# Patient Record
Sex: FEMALE | Race: BLACK OR AFRICAN AMERICAN | Employment: OTHER | URBAN - METROPOLITAN AREA
[De-identification: names, ages, dates, MRNs, and addresses within clinical notes are randomized per-mention and may not be internally consistent; named-entity substitution may affect disease eponyms.]

---

## 2018-10-19 RX ORDER — METOPROLOL SUCCINATE 50 MG/1
50 TABLET, EXTENDED RELEASE ORAL DAILY
Qty: 180 TAB | Refills: 1 | Status: SHIPPED | OUTPATIENT
Start: 2018-10-19 | End: 2019-02-20 | Stop reason: SDUPTHER

## 2018-11-23 RX ORDER — PANTOPRAZOLE SODIUM 40 MG/1
40 TABLET, DELAYED RELEASE ORAL DAILY
Qty: 180 TAB | Refills: 0 | Status: SHIPPED | OUTPATIENT
Start: 2018-11-23 | End: 2019-05-17 | Stop reason: SDUPTHER

## 2018-11-23 RX ORDER — PANTOPRAZOLE SODIUM 40 MG/1
40 TABLET, DELAYED RELEASE ORAL DAILY
COMMUNITY
End: 2018-11-23 | Stop reason: SDUPTHER

## 2019-02-14 ENCOUNTER — OFFICE VISIT (OUTPATIENT)
Dept: FAMILY MEDICINE CLINIC | Age: 84
End: 2019-02-14

## 2019-02-14 VITALS
RESPIRATION RATE: 16 BRPM | DIASTOLIC BLOOD PRESSURE: 76 MMHG | TEMPERATURE: 97.9 F | SYSTOLIC BLOOD PRESSURE: 193 MMHG | HEART RATE: 68 BPM | HEIGHT: 55 IN | WEIGHT: 142 LBS | BODY MASS INDEX: 32.86 KG/M2 | OXYGEN SATURATION: 100 %

## 2019-02-14 DIAGNOSIS — I10 ESSENTIAL HYPERTENSION: Primary | ICD-10-CM

## 2019-02-14 RX ORDER — LOSARTAN POTASSIUM 25 MG/1
25 TABLET ORAL DAILY
Refills: 1 | COMMUNITY
Start: 2019-01-08

## 2019-02-14 RX ORDER — AMLODIPINE BESYLATE 10 MG/1
TABLET ORAL DAILY
COMMUNITY
End: 2019-02-14 | Stop reason: SDUPTHER

## 2019-02-14 RX ORDER — MYCOPHENOLATE MOFETIL 250 MG/1
CAPSULE ORAL
Refills: 1 | COMMUNITY
Start: 2019-02-04 | End: 2019-03-28 | Stop reason: ALTCHOICE

## 2019-02-14 RX ORDER — AMLODIPINE BESYLATE 10 MG/1
10 TABLET ORAL DAILY
Qty: 90 TAB | Refills: 1 | Status: SHIPPED | OUTPATIENT
Start: 2019-02-14 | End: 2019-08-06 | Stop reason: SDUPTHER

## 2019-02-14 NOTE — PROGRESS NOTES
Assessment/Plan:  
 
{There are no diagnoses linked to this encounter. (Refresh or delete this SmartLink)} Follow-up Disposition: Not on File Discussed expected course/resolution/complications of diagnosis in detail with patient.   
Medication risks/benefits/costs/interactions/alternatives discussed with patient.   
Pt was given after visit summary which includes diagnoses, current medications & vitals. Pt expressed understanding with the diagnosis and plan Subjective:  
  
Turner Juarez is a 80 y.o. female who presents for had concerns including Hospital Follow Up ([address]   1 WEEK AGO   ELEVATED BP CAUSING H/A). Here today for follow up hospital on 2/8/19 due to headache and BP found to be in 200's / 100's. She had CT and \"other tests and were normal\"  She was told to come back to PCP for medication evaluation. Takes BP at home and states that some days it is high and some days normal.  Today BP is 193/76. She states she has a headache. Denies CP, SOB, palpitations or leg swelling today. She states her Nephrologist put her back on losartan  25mg in Jan this year because her she had protein in her urine. She also states she was taking 10mg of amlodipine and had not taking it since December because she ran out. Current Outpatient Medications Medication Sig Dispense Refill  losartan (COZAAR) 25 mg tablet TK 1 T PO QD  1  
 mycophenolate mofetil (CELLCEPT) 250 mg capsule TAKE 3 CAPSULES BY MOUTH BID  1  
 pantoprazole (PROTONIX) 40 mg tablet Take 1 Tab by mouth daily. 180 Tab 0  
 metoprolol succinate (TOPROL-XL) 50 mg XL tablet Take 1 Tab by mouth daily. 180 Tab 1 No Known Allergies ROS:  
ROS Objective:  
 
Visit Vitals /76 Pulse 68 Temp 97.9 °F (36.6 °C) (Oral) Resp 16 Ht 4' 6\" (1.372 m) Wt 142 lb (64.4 kg) SpO2 100% BMI 34.24 kg/m² Vitals and Nurse Documentation reviewed.   
 
Physical Exam 
 
 No results found for this or any previous visit.

## 2019-02-14 NOTE — PATIENT INSTRUCTIONS
High Blood Pressure: Care Instructions  Overview    It's normal for blood pressure to go up and down throughout the day. But if it stays up, you have high blood pressure. Another name for high blood pressure is hypertension. Despite what a lot of people think, high blood pressure usually doesn't cause headaches or make you feel dizzy or lightheaded. It usually has no symptoms. But it does increase your risk of stroke, heart attack, and other problems. You and your doctor will talk about your risks of these problems based on your blood pressure. Your doctor will give you a goal for your blood pressure. Your goal will be based on your health and your age. Lifestyle changes, such as eating healthy and being active, are always important to help lower blood pressure. You might also take medicine to reach your blood pressure goal.  Follow-up care is a key part of your treatment and safety. Be sure to make and go to all appointments, and call your doctor if you are having problems. It's also a good idea to know your test results and keep a list of the medicines you take. How can you care for yourself at home? Medical treatment  · If you stop taking your medicine, your blood pressure will go back up. You may take one or more types of medicine to lower your blood pressure. Be safe with medicines. Take your medicine exactly as prescribed. Call your doctor if you think you are having a problem with your medicine. · Talk to your doctor before you start taking aspirin every day. Aspirin can help certain people lower their risk of a heart attack or stroke. But taking aspirin isn't right for everyone, because it can cause serious bleeding. · See your doctor regularly. You may need to see the doctor more often at first or until your blood pressure comes down. · If you are taking blood pressure medicine, talk to your doctor before you take decongestants or anti-inflammatory medicine, such as ibuprofen.  Some of these medicines can raise blood pressure. · Learn how to check your blood pressure at home. Lifestyle changes  · Stay at a healthy weight. This is especially important if you put on weight around the waist. Losing even 10 pounds can help you lower your blood pressure. · If your doctor recommends it, get more exercise. Walking is a good choice. Bit by bit, increase the amount you walk every day. Try for at least 30 minutes on most days of the week. You also may want to swim, bike, or do other activities. · Avoid or limit alcohol. Talk to your doctor about whether you can drink any alcohol. · Try to limit how much sodium you eat to less than 2,300 milligrams (mg) a day. Your doctor may ask you to try to eat less than 1,500 mg a day. · Eat plenty of fruits (such as bananas and oranges), vegetables, legumes, whole grains, and low-fat dairy products. · Lower the amount of saturated fat in your diet. Saturated fat is found in animal products such as milk, cheese, and meat. Limiting these foods may help you lose weight and also lower your risk for heart disease. · Do not smoke. Smoking increases your risk for heart attack and stroke. If you need help quitting, talk to your doctor about stop-smoking programs and medicines. These can increase your chances of quitting for good. When should you call for help? Call 911 anytime you think you may need emergency care. This may mean having symptoms that suggest that your blood pressure is causing a serious heart or blood vessel problem. Your blood pressure may be over 180/120.   For example, call 911 if:    · You have symptoms of a heart attack. These may include:  ? Chest pain or pressure, or a strange feeling in the chest.  ? Sweating. ? Shortness of breath. ? Nausea or vomiting. ? Pain, pressure, or a strange feeling in the back, neck, jaw, or upper belly or in one or both shoulders or arms. ? Lightheadedness or sudden weakness.   ? A fast or irregular heartbeat.     · You have symptoms of a stroke. These may include:  ? Sudden numbness, tingling, weakness, or loss of movement in your face, arm, or leg, especially on only one side of your body. ? Sudden vision changes. ? Sudden trouble speaking. ? Sudden confusion or trouble understanding simple statements. ? Sudden problems with walking or balance. ? A sudden, severe headache that is different from past headaches.     · You have severe back or belly pain.    Do not wait until your blood pressure comes down on its own. Get help right away.   Call your doctor now or seek immediate care if:    · Your blood pressure is much higher than normal (such as 180/120 or higher), but you don't have symptoms.     · You think high blood pressure is causing symptoms, such as:  ? Severe headache.  ? Blurry vision.    Watch closely for changes in your health, and be sure to contact your doctor if:    · Your blood pressure measures higher than your doctor recommends at least 2 times. That means the top number is higher or the bottom number is higher, or both.     · You think you may be having side effects from your blood pressure medicine. Where can you learn more? Go to http://jordyn-alec.info/. Enter Y233 in the search box to learn more about \"High Blood Pressure: Care Instructions. \"  Current as of: July 22, 2018  Content Version: 11.9  © 5448-3890 [employer]. Care instructions adapted under license by [employer] (which disclaims liability or warranty for this information). If you have questions about a medical condition or this instruction, always ask your healthcare professional. [place] [address] any warranty or liability for your use of this information.

## 2019-02-14 NOTE — PROGRESS NOTES
Chief Complaint   Patient presents with   [facility] Follow Up     JW H/O   HYPERTENSION   1 WEEK AGO   ELEVATED BP CAUSING H/A        Health Maintenance Due   Topic    DTaP/Tdap/Td series (1 - Tdap)    Shingrix Vaccine Age 50> (1 of 2)    GLAUCOMA SCREENING Q2Y     Bone Densitometry (Dexa) Screening     Pneumococcal 65+ Low/Medium Risk (1 of 2 - PCV13)    Influenza Age 5 to Adult        Wt Readings from Last 3 Encounters:   02/14/19 142 lb (64.4 kg)     Temp Readings from Last 3 Encounters:   02/14/19 97.9 °F (36.6 °C) (Oral)     BP Readings from Last 3 Encounters:   02/14/19 199/79     Pulse Readings from Last 3 Encounters:   02/14/19 68         Learning Assessment:  :     No flowsheet data found. Depression Screening:  :     3 most recent PHQ Screens 2/14/2019   Little interest or pleasure in doing things Not at all   Feeling down, depressed, irritable, or hopeless Not at all   Total Score PHQ 2 0       Fall Risk Assessment:  :     Fall Risk Assessment, last 12 mths 2/14/2019   Able to walk? Yes   Fall in past 12 months? No       Abuse Screening:  :     No flowsheet data found. Coordination of Care Questionnaire:  :     1) Have you been to an emergency room, urgent care clinic since your last visit? NO   Hospitalized since your last visit? NO             2) Have you seen or consulted any other health care providers outside of [address] [place] [address] since your last visit? NO    3) Do you have an Advance Directive on file? NO    Patient is accompanied by self I have received verbal consent from Adelina Snellen to discuss any/all medical information while they are present in the room.

## 2019-02-16 NOTE — PROGRESS NOTES
Assessment/Plan:     Diagnoses and all orders for this visit:    1. Essential hypertension  -     amLODIPine (NORVASC) 10 mg tablet; Take 1 Tab by mouth daily. - Worsening, refill of amlodipine today, monitor BP at home, RTC in 2 wks, reasons to go to ED discussed. Follow-up Disposition:  Return in about 2 weeks (around 2/28/2019). Discussed expected course/resolution/complications of diagnosis in detail with patient.    Medication risks/benefits/costs/interactions/alternatives discussed with patient.    Pt was given after visit summary which includes diagnoses, current medications & vitals. Pt expressed understanding with the diagnosis and plan        Subjective:      Sue Collado is a 80 y.o. female who presents for had concerns including Hospital Follow Up ([address]   1 WEEK AGO   ELEVATED BP CAUSING H/A). Here today for follow up hospital on 2/8/19 due to headache and BP found to be in 200's / 100's. She had CT and \"other tests and were normal\"  She was told to come back to PCP for medication evaluation. Takes BP at home and states that some days it is high and some days normal.  Today BP is 193/76. She states she has a headache. Denies CP, SOB, palpitations or leg swelling today. She states her Nephrologist put her back on losartan  25mg in Jan this year because her she had protein in her urine. She also states she was taking 10mg of amlodipine and had not taking it since December because she ran out. Current Outpatient Medications   Medication Sig Dispense Refill    losartan (COZAAR) 25 mg tablet TK 1 T PO QD  1    mycophenolate mofetil (CELLCEPT) 250 mg capsule TAKE 3 CAPSULES BY MOUTH BID  1    amLODIPine (NORVASC) 10 mg tablet Take 1 Tab by mouth daily. 90 Tab 1    pantoprazole (PROTONIX) 40 mg tablet Take 1 Tab by mouth daily. 180 Tab 0    metoprolol succinate (TOPROL-XL) 50 mg XL tablet Take 1 Tab by mouth daily.  180 Tab 1       No Known Allergies    ROS: Review of Systems   Constitutional: Negative for fever and malaise/fatigue. Respiratory: Negative for cough and shortness of breath. Cardiovascular: Negative for chest pain, palpitations and leg swelling. Neurological: Positive for headaches. Objective:     Visit Vitals  /76   Pulse 68   Temp 97.9 °F (36.6 °C) (Oral)   Resp 16   Ht 4' 6\" (1.372 m)   Wt 142 lb (64.4 kg)   SpO2 100%   BMI 34.24 kg/m²       Vitals and Nurse Documentation reviewed. Physical Exam   Constitutional: She is well-developed, well-nourished, and in no distress. No distress. HENT:   Head: Normocephalic and atraumatic. Cardiovascular: Normal rate, regular rhythm and normal heart sounds. Exam reveals no gallop and no friction rub. No murmur heard. Pulmonary/Chest: Effort normal and breath sounds normal. No respiratory distress. She has no wheezes. She has no rales. Neurological: She is alert. Skin: She is not diaphoretic. Psychiatric: Affect normal.       No results found for this or any previous visit.

## 2019-02-20 RX ORDER — METOPROLOL SUCCINATE 50 MG/1
50 TABLET, EXTENDED RELEASE ORAL 2 TIMES DAILY
Qty: 180 TAB | Refills: 1 | Status: SHIPPED | OUTPATIENT
Start: 2019-02-20 | End: 2019-08-19 | Stop reason: SDUPTHER

## 2019-02-28 ENCOUNTER — OFFICE VISIT (OUTPATIENT)
Dept: FAMILY MEDICINE CLINIC | Age: 84
End: 2019-02-28

## 2019-02-28 VITALS
HEIGHT: 55 IN | SYSTOLIC BLOOD PRESSURE: 128 MMHG | WEIGHT: 143.5 LBS | DIASTOLIC BLOOD PRESSURE: 72 MMHG | TEMPERATURE: 98.2 F | HEART RATE: 74 BPM | RESPIRATION RATE: 16 BRPM | OXYGEN SATURATION: 98 % | BODY MASS INDEX: 33.21 KG/M2

## 2019-02-28 DIAGNOSIS — Z00.00 MEDICARE ANNUAL WELLNESS VISIT, SUBSEQUENT: Primary | ICD-10-CM

## 2019-02-28 DIAGNOSIS — I10 ESSENTIAL HYPERTENSION: ICD-10-CM

## 2019-02-28 DIAGNOSIS — F41.9 ANXIETY: ICD-10-CM

## 2019-02-28 LAB
CREATININE, EXTERNAL: 1.83
MICROALBUMIN UR TEST STR-MCNC: 510.6 MG/DL

## 2019-02-28 RX ORDER — HYDROXYZINE 25 MG/1
12.5 TABLET, FILM COATED ORAL
Qty: 30 TAB | Refills: 1 | Status: SHIPPED | OUTPATIENT
Start: 2019-02-28 | End: 2019-03-10

## 2019-02-28 NOTE — PATIENT INSTRUCTIONS
Medicare Wellness Visit, Female The best way to live healthy is to have a lifestyle where you eat a well-balanced diet, exercise regularly, limit alcohol use, and quit all forms of tobacco/nicotine, if applicable. Regular preventive services are another way to keep healthy. Preventive services (vaccines, screening tests, monitoring & exams) can help personalize your care plan, which helps you manage your own care. Screening tests can find health problems at the earliest stages, when they are easiest to treat. [facility] follows the current, evidence-based guidelines published by the [facility] ([facility]) when recommending preventive services for our patients. Because we follow these guidelines, sometimes recommendations change over time as research supports it. (For example, mammograms used to be recommended annually. Even though Medicare will still pay for an annual mammogram, the newer guidelines recommend a mammogram every two years for women of average risk.) Of course, you and your doctor may decide to screen more often for some diseases, based on your risk and your health status. Preventive services for you include: - Medicare offers their members a free annual wellness visit, which is time for you and your primary care provider to discuss and plan for your preventive service needs. Take advantage of this benefit every year! 
-All adults over the age of 72 should receive the recommended pneumonia vaccines. Current USPSTF guidelines recommend a series of two vaccines for the best pneumonia protection.  
-All adults should have a flu vaccine yearly and a tetanus vaccine every 10 years. All adults age 61 and older should receive a shingles vaccine once in their lifetime.   
-A bone mass density test is recommended when a woman turns 65 to screen for osteoporosis. This test is only recommended one time, as a screening. Some providers will use this same test as a disease monitoring tool if you already have osteoporosis. -All adults age 38-68 who are overweight should have a diabetes screening test once every three years.  
-Other screening tests and preventive services for persons with diabetes include: an eye exam to screen for diabetic retinopathy, a kidney function test, a foot exam, and stricter control over your cholesterol.  
-Cardiovascular screening for adults with routine risk involves an electrocardiogram (ECG) at intervals determined by your doctor.  
-Colorectal cancer screenings should be done for adults age 54-65 with no increased risk factors for colorectal cancer. There are a number of acceptable methods of screening for this type of cancer. Each test has its own benefits and drawbacks. Discuss with your doctor what is most appropriate for you during your annual wellness visit. The different tests include: colonoscopy (considered the best screening method), a fecal occult blood test, a fecal DNA test, and sigmoidoscopy. -Breast cancer screenings are recommended every other year for women of normal risk, age 54-69. 
-Cervical cancer screenings for women over age 72 are only recommended with certain risk factors.  
-All adults born between [facility] should be screened once for Hepatitis C. Here is a list of your current Health Maintenance items (your personalized list of preventive services) with a due date: 
Health Maintenance Due Topic Date Due  
 DTaP/Tdap/Td  (1 - Tdap) 06/16/1954  Shingles Vaccine (1 of 2) 06/16/1983  Glaucoma Screening   06/16/1998  Bone Mineral Density   06/16/1998  Pneumococcal Vaccine (1 of 2 - PCV13) 06/16/1998  Flu Vaccine  08/01/2018 Anxiety Disorder: Care Instructions Your Care Instructions Anxiety is a normal reaction to stress. Difficult situations can cause you to have symptoms such as sweaty palms and a nervous feeling. In an anxiety disorder, the symptoms are far more severe. Constant worry, muscle tension, trouble sleeping, nausea and diarrhea, and other symptoms can make normal daily activities difficult or impossible. These symptoms may occur for no reason, and they can affect your work, school, or social life. Medicines, counseling, and self-care can all help. Follow-up care is a key part of your treatment and safety. Be sure to make and go to all appointments, and call your doctor if you are having problems. It's also a good idea to know your test results and keep a list of the medicines you take. How can you care for yourself at home? · Take medicines exactly as directed. Call your doctor if you think you are having a problem with your medicine. · Go to your counseling sessions and follow-up appointments. · Recognize and accept your anxiety. Then, when you are in a situation that makes you anxious, say to yourself, \"This is not an emergency. I feel uncomfortable, but I am not in danger. I can keep going even if I feel anxious. \" · Be kind to your body: 
? Relieve tension with exercise or a massage. ? Get enough rest. 
? Avoid alcohol, caffeine, nicotine, and illegal drugs. They can increase your anxiety level and cause sleep problems. ? Learn and do relaxation techniques. See below for more about these techniques. · Engage your mind. Get out and do something you enjoy. Go to a funny movie, or take a walk or hike. Plan your day. Having too much or too little to do can make you anxious. · Keep a record of your symptoms. Discuss your fears with a good friend or family member, or join a support group for people with similar problems. Talking to others sometimes relieves stress. · Get involved in social groups, or volunteer to help others. Being alone sometimes makes things seem worse than they are.  
· Get at least 30 minutes of exercise on most days of the week to relieve stress. Walking is a good choice. You also may want to do other activities, such as running, swimming, cycling, or playing tennis or team sports. Relaxation techniques Do relaxation exercises 10 to 20 minutes a day. You can play soothing, relaxing music while you do them, if you wish. · Tell others in your house that you are going to do your relaxation exercises. Ask them not to disturb you. · Find a comfortable place, away from all distractions and noise. · Lie down on your back, or sit with your back straight. · Focus on your breathing. Make it slow and steady. · Breathe in through your nose. Breathe out through either your nose or mouth. · Breathe deeply, filling up the area between your navel and your rib cage. Breathe so that your belly goes up and down. · Do not hold your breath. · Breathe like this for 5 to 10 minutes. Notice the feeling of calmness throughout your whole body. As you continue to breathe slowly and deeply, relax by doing the following for another 5 to 10 minutes: · Tighten and relax each muscle group in your body. You can begin at your toes and work your way up to your head. · Imagine your muscle groups relaxing and becoming heavy. · Empty your mind of all thoughts. · Let yourself relax more and more deeply. · Become aware of the state of calmness that surrounds you. · When your relaxation time is over, you can bring yourself back to alertness by moving your fingers and toes and then your hands and feet and then stretching and moving your entire body. Sometimes people fall asleep during relaxation, but they usually wake up shortly afterward. · Always give yourself time to return to full alertness before you drive a car or do anything that might cause an accident if you are not fully alert. Never play a relaxation tape while you drive a car. When should you call for help? Call 911 anytime you think you may need emergency care. For example, call if:   · You feel you cannot stop from hurting yourself or someone else.  
Moe Vazquez the numbers for these national suicide hotlines: 0-331-685-TALK (4-801.471.2190) and 5-729-STBBSIR (8-993.343.3935). If you or someone you know talks about suicide or feeling hopeless, get help right away. 
 Watch closely for changes in your health, and be sure to contact your doctor if: 
  · You have anxiety or fear that affects your life.  
  · You have symptoms of anxiety that are new or different from those you had before. Where can you learn more? Go to http://jordyn-alec.info/. Enter P754 in the search box to learn more about \"Anxiety Disorder: Care Instructions. \" Current as of: September 11, 2018 Content Version: 11.9 © 8833-6907 [employer]. Care instructions adapted under license by [employer] (which disclaims liability or warranty for this information). If you have questions about a medical condition or this instruction, always ask your healthcare professional. [address] any warranty or liability for your use of this information.

## 2019-02-28 NOTE — PROGRESS NOTES
This is the Subsequent Medicare Annual Wellness Exam, performed 12 months or more after the Initial AWV or the last Subsequent AWV I have reviewed the patient's medical history in detail and updated the computerized patient record. History Here today for annual medicare wellness exam and follow up on BP. Was here 2 weeks ago as follow up from hospital for hypertension issues. At last visit we found that she was no longer taking her amlodipine. We restarted amlodipine. She checks BP at home and states gets normal readings up until this past Monday, about 4 days ago. About 3 weeks ago when she was in the hospital and they did MRI of brain, Echo and other tests and were all found to be normal.  She did not bring her medication today. Son is here in the room. Anxiety Son states she has anxiety and she worries over things and that is when her blood pressure goes up. Ms Raquel Hay states she does worry about little things and she can feel when her BP is going up. Past Medical History:  
Diagnosis Date  Diverticulitis  Hypertension History reviewed. No pertinent surgical history. Current Outpatient Medications Medication Sig Dispense Refill  hydrOXYzine HCl (ATARAX) 25 mg tablet Take 0.5 Tabs by mouth two (2) times daily as needed for Anxiety for up to 10 days. 30 Tab 1  
 metoprolol succinate (TOPROL-XL) 50 mg XL tablet Take 1 Tab by mouth two (2) times a day. 180 Tab 1  
 losartan (COZAAR) 25 mg tablet TK 1 T PO QD  1  
 mycophenolate mofetil (CELLCEPT) 250 mg capsule TAKE 3 CAPSULES BY MOUTH BID  1  
 amLODIPine (NORVASC) 10 mg tablet Take 1 Tab by mouth daily. 90 Tab 1  
 pantoprazole (PROTONIX) 40 mg tablet Take 1 Tab by mouth daily. 180 Tab 0 No Known Allergies History reviewed. No pertinent family history. Social History Tobacco Use  Smoking status: Never Smoker  Smokeless tobacco: Never Used Substance Use Topics  Alcohol use:  No  
 Frequency: Never There is no problem list on file for this patient. Review of Systems Constitutional: Negative for fever and malaise/fatigue. Respiratory: Negative for cough and shortness of breath. Cardiovascular: Negative for chest pain, palpitations and leg swelling. Neurological: Negative for dizziness and headaches. Psychiatric/Behavioral: Negative for depression. The patient is nervous/anxious. Depression Risk Factor Screening:  
 
3 most recent PHQ Screens 2/14/2019 Little interest or pleasure in doing things Not at all Feeling down, depressed, irritable, or hopeless Not at all Total Score PHQ 2 0 Alcohol Risk Factor Screening: You do not drink alcohol or very rarely. Functional Ability and Level of Safety:  
Hearing Loss Hearing is good. Activities of Daily Living The home contains: handrails and grab bars Patient does total self care Fall Risk Fall Risk Assessment, last 12 mths 2/14/2019 Able to walk? Yes Fall in past 12 months? No  
 
 
Abuse Screen Patient is not abused Cognitive Screening Evaluation of Cognitive Function: 
Has your family/caregiver stated any concerns about your memory: no 
Normal 
 
Physical Exam  
Constitutional: She is well-developed, well-nourished, and in no distress. No distress. HENT:  
Head: Normocephalic and atraumatic. Cardiovascular: Normal rate, regular rhythm and normal heart sounds. Exam reveals no gallop and no friction rub. No murmur heard. Pulmonary/Chest: Effort normal and breath sounds normal. No respiratory distress. She has no wheezes. She has no rales. Neurological: She is alert. Skin: She is not diaphoretic. Psychiatric: Affect normal.  
 
 
Patient Care Team  
Patient Care Team: Mayito Church MD as PCP - [facility]) Assessment/Plan Education and counseling provided: 
Are appropriate based on today's review and evaluation Diagnoses and all orders for this visit: 
 
1. Medicare annual wellness visit, subsequent 2. Anxiety 
-     hydrOXYzine HCl (ATARAX) 25 mg tablet; Take 0.5 Tabs by mouth two (2) times daily as needed for Anxiety for up to 10 days. - Worsening, start hydroxyzine today as directed. RTC in 1 month for follow up 3. Essential hypertension 
 -Stable today at clinic. Take medications as prescribed. Bring medications to next visit. No labs today as recent labs at hospital were all normal.  Reasons to go to ED discussed. Health Maintenance Due Topic Date Due  
 DTaP/Tdap/Td series (1 - Tdap) 06/16/1954  Shingrix Vaccine Age 50> (1 of 2) 06/16/1983  GLAUCOMA SCREENING Q2Y  06/16/1998  Bone Densitometry (Dexa) Screening  06/16/1998  Pneumococcal 65+ Low/Medium Risk (1 of 2 - PCV13) 06/16/1998  Influenza Age 5 to Adult  08/01/2018

## 2019-02-28 NOTE — PROGRESS NOTES
Chery Castro is a 80 y.o. female Chief Complaint Patient presents with  Follow-up  
  blood pressure 1. Have you been to the ER, urgent care clinic since your last visit? Hospitalized since your last visit? Yes BP (  )  2/25/19  
 
 
2. Have you seen or consulted any other health care providers outside of the [address] [place] [address] since your last visit? Include any pap smears or colon screening.   No

## 2019-03-13 PROBLEM — N18.32 CHRONIC KIDNEY DISEASE (CKD) STAGE G3B/A3, MODERATELY DECREASED GLOMERULAR FILTRATION RATE (GFR) BETWEEN 30-44 ML/MIN/1.73 SQUARE METER AND ALBUMINURIA CREATININE RATIO GREATER THAN 300 MG/G (HCC): Status: ACTIVE | Noted: 2019-03-13

## 2019-03-28 ENCOUNTER — OFFICE VISIT (OUTPATIENT)
Dept: FAMILY MEDICINE CLINIC | Age: 84
End: 2019-03-28

## 2019-03-28 VITALS
RESPIRATION RATE: 16 BRPM | HEART RATE: 68 BPM | WEIGHT: 143 LBS | DIASTOLIC BLOOD PRESSURE: 77 MMHG | TEMPERATURE: 98.4 F | BODY MASS INDEX: 33.09 KG/M2 | OXYGEN SATURATION: 100 % | SYSTOLIC BLOOD PRESSURE: 146 MMHG | HEIGHT: 55 IN

## 2019-03-28 DIAGNOSIS — I10 ESSENTIAL HYPERTENSION: Primary | ICD-10-CM

## 2019-03-28 DIAGNOSIS — F41.9 ANXIETY: ICD-10-CM

## 2019-03-28 RX ORDER — LOSARTAN POTASSIUM 25 MG/1
TABLET ORAL
COMMUNITY
Start: 2019-01-08 | End: 2019-03-28 | Stop reason: SDUPTHER

## 2019-03-28 RX ORDER — AMLODIPINE BESYLATE 10 MG/1
TABLET ORAL
COMMUNITY
Start: 2019-02-14 | End: 2019-03-28 | Stop reason: SDUPTHER

## 2019-03-28 RX ORDER — PANTOPRAZOLE SODIUM 40 MG/1
TABLET, DELAYED RELEASE ORAL
COMMUNITY
Start: 2019-02-19 | End: 2019-03-28 | Stop reason: ALTCHOICE

## 2019-03-28 RX ORDER — METOPROLOL SUCCINATE 50 MG/1
TABLET, EXTENDED RELEASE ORAL
COMMUNITY
Start: 2019-02-20 | End: 2019-03-28 | Stop reason: SDUPTHER

## 2019-03-28 RX ORDER — DICLOFENAC SODIUM 10 MG/G
GEL TOPICAL
COMMUNITY
Start: 2019-03-07 | End: 2022-01-05

## 2019-03-28 RX ORDER — HYDROXYZINE 25 MG/1
TABLET, FILM COATED ORAL
COMMUNITY
End: 2019-07-23 | Stop reason: SDUPTHER

## 2019-03-28 NOTE — PROGRESS NOTES
Bard Marie is a 80 y.o. female      Chief Complaint   Patient presents with    Follow-up     Blood Pressure          1. Have you been to the ER, urgent care clinic since your last visit? Hospitalized since your last visit?   no      2. Have you seen or consulted any other health care providers outside of the [address] [place] [address] since your last visit? Include any pap smears or colon screening.   no

## 2019-03-28 NOTE — PROGRESS NOTES
Assessment/Plan:     Diagnoses and all orders for this visit:    1. Essential hypertension   -improved, continue current therapy, monitor BP at home, RTC in 6 months for follow up. Reasons to go to ED discussed    2. Anxiety   -  Improved, continue current therapy. Follow up as needed            Discussed expected course/resolution/complications of diagnosis in detail with patient.    Medication risks/benefits/costs/interactions/alternatives discussed with patient.    Pt was given after visit summary which includes diagnoses, current medications & vitals. Pt expressed understanding with the diagnosis and plan        Subjective:      Ronaldo Pickens is a 80 y.o. female who presents for had concerns including Follow-up (Blood Pressure ). Here today for BP follow up. BP at goal today. 146/77. Taking medications as prescribed. She checks BP at home and states getting readings around 140/70. No reported side effects. She states she is \"worrying\" less and that the hydroxizine has really helped. She only takes it at night when she needs it. Denies CP, SOB, palpitations    Current Outpatient Medications   Medication Sig Dispense Refill    diclofenac (VOLTAREN) 1 % gel Apply two grams by topical route three times daily to the affected area (s).  hydrOXYzine HCl (ATARAX) 25 mg tablet Take  by mouth three (3) times daily as needed for Itching.  metoprolol succinate (TOPROL-XL) 50 mg XL tablet Take 1 Tab by mouth two (2) times a day. 180 Tab 1    losartan (COZAAR) 25 mg tablet TK 1 T PO QD  1    amLODIPine (NORVASC) 10 mg tablet Take 1 Tab by mouth daily. 90 Tab 1    pantoprazole (PROTONIX) 40 mg tablet Take 1 Tab by mouth daily. 180 Tab 0       Allergies   Allergen Reactions    Penicillins Swelling    Unable To Obtain Swelling       ROS:   Review of Systems   Constitutional: Negative for fever and malaise/fatigue. Respiratory: Negative for cough and shortness of breath.     Cardiovascular: Negative for chest pain, palpitations and leg swelling. Neurological: Negative for dizziness and headaches. Psychiatric/Behavioral: Negative for depression. The patient is not nervous/anxious. Objective:     Visit Vitals  /77 (BP 1 Location: Right arm, BP Patient Position: Sitting)   Pulse 68   Temp 98.4 °F (36.9 °C) (Oral)   Resp 16   Ht 4' 6\" (1.372 m)   Wt 143 lb (64.9 kg)   SpO2 100%   BMI 34.48 kg/m²       Vitals and Nurse Documentation reviewed. Physical Exam   Constitutional: She is well-developed, well-nourished, and in no distress. No distress. HENT:   Head: Normocephalic and atraumatic. Cardiovascular: Normal rate, regular rhythm and normal heart sounds. Exam reveals no gallop and no friction rub. No murmur heard. Pulmonary/Chest: Effort normal and breath sounds normal. No respiratory distress. She has no wheezes. She has no rales. Neurological: She is alert. Skin: She is not diaphoretic. Psychiatric: Affect normal.       No results found for this or any previous visit.

## 2019-05-17 LAB
CREATININE, EXTERNAL: 1.72
MICROALBUMIN UR TEST STR-MCNC: 795.7 MG/DL

## 2019-07-12 RX ORDER — HYDROXYZINE 25 MG/1
TABLET, FILM COATED ORAL
Qty: 60 TAB | Refills: 1 | OUTPATIENT
Start: 2019-07-12

## 2019-07-23 ENCOUNTER — OFFICE VISIT (OUTPATIENT)
Dept: FAMILY MEDICINE CLINIC | Age: 84
End: 2019-07-23

## 2019-07-23 VITALS
BODY MASS INDEX: 33.09 KG/M2 | HEART RATE: 65 BPM | SYSTOLIC BLOOD PRESSURE: 134 MMHG | OXYGEN SATURATION: 98 % | TEMPERATURE: 98.1 F | RESPIRATION RATE: 16 BRPM | HEIGHT: 55 IN | DIASTOLIC BLOOD PRESSURE: 74 MMHG | WEIGHT: 143 LBS

## 2019-07-23 DIAGNOSIS — F41.9 ANXIETY: Primary | ICD-10-CM

## 2019-07-23 RX ORDER — HYDROXYZINE 25 MG/1
12.5 TABLET, FILM COATED ORAL 2 TIMES DAILY
Qty: 30 TAB | Refills: 1 | Status: SHIPPED | OUTPATIENT
Start: 2019-07-23 | End: 2019-10-22 | Stop reason: SDUPTHER

## 2019-07-23 NOTE — PROGRESS NOTES
Assessment/Plan:     Diagnoses and all orders for this visit:    1. Anxiety  -     hydrOXYzine HCl (ATARAX) 25 mg tablet; Take 0.5 Tabs by mouth two (2) times a day. - Stable, refill today, educated on side effects. RTC as needed for anxiety. Discussed expected course/resolution/complications of diagnosis in detail with patient.    Medication risks/benefits/costs/interactions/alternatives discussed with patient.    Pt was given after visit summary which includes diagnoses, current medications & vitals. Pt expressed understanding with the diagnosis and plan        Subjective:      Chetan Finney is a 80 y.o. female who presents for had concerns including Medication Refill (HYDROXYZINE, PANTOPRAZOL). Here today for follow up on anxiety. Takes 1/2 pill of hydroxyzine sometimes at night when she is anxious and unable to sleep. Would like a refill today. States going through stressful event right now as 39year old grandson is in hospice due to heart problems. Needs refill on pantoprazole for acid reflux. States takes 1 tab every day, 40mg. Doing well. BP at goal today. Current Outpatient Medications   Medication Sig Dispense Refill    hydrOXYzine HCl (ATARAX) 25 mg tablet Take 0.5 Tabs by mouth two (2) times a day. 30 Tab 1    pantoprazole (PROTONIX) 40 mg tablet TAKE 1 TABLET BY MOUTH EVERY  Tab 0    diclofenac (VOLTAREN) 1 % gel Apply two grams by topical route three times daily to the affected area (s).  metoprolol succinate (TOPROL-XL) 50 mg XL tablet Take 1 Tab by mouth two (2) times a day. 180 Tab 1    losartan (COZAAR) 25 mg tablet TK 1 T PO QD  1    amLODIPine (NORVASC) 10 mg tablet Take 1 Tab by mouth daily. 90 Tab 1       Allergies   Allergen Reactions    Penicillins Swelling    Unable To Obtain Swelling       ROS:   Review of Systems   Constitutional: Negative for fever and malaise/fatigue. Respiratory: Negative for cough and shortness of breath. Cardiovascular: Negative for chest pain, palpitations and leg swelling. Gastrointestinal: Positive for heartburn. Negative for abdominal pain, blood in stool, constipation, diarrhea, melena, nausea and vomiting. Neurological: Negative for dizziness and headaches. Psychiatric/Behavioral: Negative for depression, substance abuse and suicidal ideas. The patient is nervous/anxious. Objective:     Visit Vitals  /74   Pulse 65   Temp 98.1 °F (36.7 °C) (Oral)   Resp 16   Ht 4' 6\" (1.372 m)   Wt 143 lb (64.9 kg)   SpO2 98%   BMI 34.48 kg/m²       Vitals and Nurse Documentation reviewed. Physical Exam   Constitutional: She is well-developed, well-nourished, and in no distress. No distress. HENT:   Head: Normocephalic and atraumatic. Cardiovascular: Normal rate, regular rhythm and normal heart sounds. Exam reveals no gallop and no friction rub. No murmur heard. Pulmonary/Chest: Effort normal and breath sounds normal. No respiratory distress. She has no wheezes. She has no rales. Neurological: She is alert. Skin: She is not diaphoretic.    Psychiatric: Affect normal.       Results for orders placed or performed in visit on 06/28/19   AMB EXT CREATININE   Result Value Ref Range    Creatinine, External 1.72    AMB EXT URINE MICROALBUMIN   Result Value Ref Range    Urine Microalbumin, External 795.7

## 2019-07-23 NOTE — PROGRESS NOTES
PATIENT STATED NAME &     Chief Complaint   Patient presents with    Medication Refill     HYDROXYZINE, PANTOPRAZOL        Health Maintenance Due   Topic    DTaP/Tdap/Td series (1 - Tdap)    Shingrix Vaccine Age 50> (1 of 2)    GLAUCOMA SCREENING Q2Y     Bone Densitometry (Dexa) Screening     Pneumococcal 65+ years (1 of 2 - PCV13)       Wt Readings from Last 3 Encounters:   19 143 lb (64.9 kg)   19 143 lb (64.9 kg)   19 143 lb 8 oz (65.1 kg)     Temp Readings from Last 3 Encounters:   19 98.1 °F (36.7 °C) (Oral)   19 98.4 °F (36.9 °C) (Oral)   19 98.2 °F (36.8 °C) (Oral)     BP Readings from Last 3 Encounters:   19 134/74   19 146/77   19 128/72     Pulse Readings from Last 3 Encounters:   19 65   19 68   19 74         Learning Assessment:  :     No flowsheet data found. Depression Screening:  :     3 most recent PHQ Screens 2019   Little interest or pleasure in doing things Not at all   Feeling down, depressed, irritable, or hopeless Not at all   Total Score PHQ 2 0       Fall Risk Assessment:  :     Fall Risk Assessment, last 12 mths 2019   Able to walk? Yes   Fall in past 12 months? No       Abuse Screening:  :     No flowsheet data found. Coordination of Care Questionnaire:  :     1) Have you been to an emergency room, urgent care clinic since your last visit? YES  PT 1ST 3 WEEKS AGO URI  Hospitalized since your last visit? NO             2) Have you seen or consulted any other health care providers outside of [address] [place] [address] since your last visit? NO  (Include any pap smears or colon screenings in this section.)    Patient is accompanied by Weston Hamilton have received verbal consent from Sid Jacinto to discuss any/all medical information while they are present in the room.

## 2019-07-23 NOTE — PATIENT INSTRUCTIONS

## 2019-08-06 DIAGNOSIS — I10 ESSENTIAL HYPERTENSION: ICD-10-CM

## 2019-08-06 RX ORDER — AMLODIPINE BESYLATE 10 MG/1
TABLET ORAL
Qty: 90 TAB | Refills: 0 | Status: SHIPPED | OUTPATIENT
Start: 2019-08-06 | End: 2019-11-01 | Stop reason: SDUPTHER

## 2019-08-20 RX ORDER — METOPROLOL SUCCINATE 50 MG/1
50 TABLET, EXTENDED RELEASE ORAL 2 TIMES DAILY
Qty: 180 TAB | Refills: 1 | Status: SHIPPED | OUTPATIENT
Start: 2019-08-20 | End: 2020-02-20

## 2019-10-22 ENCOUNTER — OFFICE VISIT (OUTPATIENT)
Dept: FAMILY MEDICINE CLINIC | Age: 84
End: 2019-10-22

## 2019-10-22 VITALS
HEIGHT: 55 IN | TEMPERATURE: 97.6 F | WEIGHT: 147 LBS | RESPIRATION RATE: 12 BRPM | BODY MASS INDEX: 34.02 KG/M2 | HEART RATE: 70 BPM | DIASTOLIC BLOOD PRESSURE: 76 MMHG | OXYGEN SATURATION: 99 % | SYSTOLIC BLOOD PRESSURE: 138 MMHG

## 2019-10-22 DIAGNOSIS — E66.01 SEVERE OBESITY (HCC): ICD-10-CM

## 2019-10-22 DIAGNOSIS — F41.9 ANXIETY: ICD-10-CM

## 2019-10-22 DIAGNOSIS — I10 ESSENTIAL HYPERTENSION: Primary | ICD-10-CM

## 2019-10-22 RX ORDER — HYDROXYZINE 25 MG/1
12.5 TABLET, FILM COATED ORAL 2 TIMES DAILY
Qty: 30 TAB | Refills: 1 | Status: SHIPPED | OUTPATIENT
Start: 2019-10-22 | End: 2019-11-12 | Stop reason: SDUPTHER

## 2019-10-22 NOTE — PROGRESS NOTES
Assessment/Plan:     Diagnoses and all orders for this visit:    1. Essential hypertension   -stable, continue current therapy. RTC in 3 months for follow up and labs    2. Anxiety  -     hydrOXYzine HCl (ATARAX) 25 mg tablet; Take 0.5 Tabs by mouth two (2) times a day. - Improved, refill today, continue current therapy, follow up as needed    3. Severe obesity ([address]   -work on diet and exercise for weight loss            Discussed expected course/resolution/complications of diagnosis in detail with patient. Medication risks/benefits/costs/interactions/alternatives discussed with patient. Pt was given after visit summary which includes diagnoses, current medications & vitals. Pt expressed understanding with the diagnosis and plan        Subjective:      Shereen Thacker is a 80 y.o. female who presents for had concerns including Hypertension (Follow Up). HTN  BP at goal today [id number]. Checks BP at home and getting normal readings. Takes metoprolol and amlodipine as directed with no reported side effects. Denies CP, SOB, palpitations or leg swelling. Anxiety,  States anxiety is doing much better. Takes hydroxzine as needed and states that has helped her relax and sleep and she feels her BP has improved. Would like a refill. Current Outpatient Medications   Medication Sig Dispense Refill    hydrOXYzine HCl (ATARAX) 25 mg tablet Take 0.5 Tabs by mouth two (2) times a day. 30 Tab 1    metoprolol succinate (TOPROL-XL) 50 mg XL tablet Take 1 Tab by mouth two (2) times a day. 180 Tab 1    amLODIPine (NORVASC) 10 mg tablet TAKE 1 TABLET BY MOUTH DAILY 90 Tab 0    pantoprazole (PROTONIX) 40 mg tablet TAKE 1 TABLET BY MOUTH EVERY  Tab 0    diclofenac (VOLTAREN) 1 % gel Apply two grams by topical route three times daily to the affected area (s).       losartan (COZAAR) 25 mg tablet TK 1 T PO QD  1       Allergies   Allergen Reactions    Penicillins Swelling    Unable To Obtain Swelling Past Medical History:   Diagnosis Date    Chronic renal failure     dx'd 2/2013    Diverticulitis     Hyperlipidemia     Hypertension     Peptic ulcer disease     Transient ischemic attack     2003    Urinary tract infection     recurrent      Past Surgical History:   Procedure Laterality Date    HX SKIN BIOPSY      Breast Biopsy benign     No family history on file. Social History     Socioeconomic History    Marital status:      Spouse name: Not on file    Number of children: Not on file    Years of education: Not on file    Highest education level: Not on file   Occupational History    Not on file   Social Needs    Financial resource strain: Not on file    Food insecurity:     Worry: Not on file     Inability: Not on file    Transportation needs:     Medical: Not on file     Non-medical: Not on file   Tobacco Use    Smoking status: Never Smoker    Smokeless tobacco: Never Used   Substance and Sexual Activity    Alcohol use: No     Frequency: Never    Drug use: No    Sexual activity: Never   Lifestyle    Physical activity:     Days per week: Not on file     Minutes per session: Not on file    Stress: Not on file   Relationships    Social connections:     Talks on phone: Not on file     Gets together: Not on file     Attends Protestant service: Not on file     Active member of club or organization: Not on file     Attends meetings of clubs or organizations: Not on file     Relationship status: Not on file    Intimate partner violence:     Fear of current or ex partner: Not on file     Emotionally abused: Not on file     Physically abused: Not on file     Forced sexual activity: Not on file   Other Topics Concern    Not on file   Social History Narrative    Not on file       HPI      ROS:   Review of Systems   Constitutional: Negative for chills, fever and malaise/fatigue. Respiratory: Negative for cough and shortness of breath.     Cardiovascular: Negative for chest pain, palpitations and leg swelling. Neurological: Negative for dizziness and headaches. Psychiatric/Behavioral: Negative for depression. The patient is nervous/anxious. Objective:     Visit Vitals  /76   Pulse 70   Temp 97.6 °F (36.4 °C) (Oral)   Resp 12   Ht 4' 6\" (1.372 m)   Wt 147 lb (66.7 kg)   SpO2 99%   BMI 35.44 kg/m²         Vitals and Nurse Documentation reviewed. Physical Exam   Constitutional: She is oriented to person, place, and time and well-developed, well-nourished, and in no distress. Vital signs are normal. No distress. HENT:   Head: Normocephalic and atraumatic. Cardiovascular: Normal rate, regular rhythm and normal heart sounds. Exam reveals no gallop and no friction rub. No murmur heard. Pulmonary/Chest: Effort normal and breath sounds normal. No respiratory distress. She has no wheezes. She has no rales. Neurological: She is alert and oriented to person, place, and time. Skin: Skin is warm, dry and intact. She is not diaphoretic. No cyanosis. No pallor. Psychiatric: Affect normal. Her mood appears not anxious. She is not agitated. She does not exhibit a depressed mood. She expresses no homicidal and no suicidal ideation.        Results for orders placed or performed in visit on 06/28/19   AMB EXT CREATININE   Result Value Ref Range    Creatinine, External 1.72    AMB EXT URINE MICROALBUMIN   Result Value Ref Range    Urine Microalbumin, External 795.7

## 2019-10-22 NOTE — PROGRESS NOTES
Patient stated name &     Chief Complaint   Patient presents with    Hypertension     Follow Up          Health Maintenance Due   Topic    DTaP/Tdap/Td series (1 - Tdap)    Shingrix Vaccine Age 50> (1 of 2)    GLAUCOMA SCREENING Q2Y     Bone Densitometry (Dexa) Screening     Pneumococcal 65+ years (1 of 2 - PCV13)    Influenza Age 5 to Adult        Wt Readings from Last 3 Encounters:   10/22/19 147 lb (66.7 kg)   19 143 lb (64.9 kg)   19 143 lb (64.9 kg)     Temp Readings from Last 3 Encounters:   10/22/19 97.6 °F (36.4 °C) (Oral)   19 98.1 °F (36.7 °C) (Oral)   19 98.4 °F (36.9 °C) (Oral)     BP Readings from Last 3 Encounters:   10/22/19 138/76   19 134/74   19 146/77     Pulse Readings from Last 3 Encounters:   10/22/19 70   19 65   19 68         Learning Assessment:  :     No flowsheet data found. Depression Screening:  :     3 most recent PHQ Screens 10/22/2019   Little interest or pleasure in doing things Not at all   Feeling down, depressed, irritable, or hopeless Not at all   Total Score PHQ 2 0       Fall Risk Assessment:  :     Fall Risk Assessment, last 12 mths 10/22/2019   Able to walk? Yes   Fall in past 12 months? No       Abuse Screening:  :     No flowsheet data found. Coordination of Care Questionnaire:  :     1) Have you been to an emergency room, urgent care clinic since your last visit? No    Hospitalized since your last visit? No             2) Have you seen or consulted any other health care providers outside of [address] [place] [address] since your last visit? No  (Include any pap smears or colon screenings in this section.)    Patient is accompanied by  I have received verbal consent from Vik Whitney to discuss any/all medical information while they are present in the room.

## 2019-11-12 ENCOUNTER — HOSPITAL ENCOUNTER (OUTPATIENT)
Dept: LAB | Age: 84
Discharge: HOME | End: 2019-11-12

## 2019-11-12 ENCOUNTER — OFFICE VISIT (OUTPATIENT)
Dept: FAMILY MEDICINE CLINIC | Age: 84
End: 2019-11-12

## 2019-11-12 VITALS
WEIGHT: 141 LBS | TEMPERATURE: 98.6 F | RESPIRATION RATE: 18 BRPM | BODY MASS INDEX: 32.63 KG/M2 | OXYGEN SATURATION: 98 % | DIASTOLIC BLOOD PRESSURE: 81 MMHG | HEART RATE: 79 BPM | SYSTOLIC BLOOD PRESSURE: 139 MMHG | HEIGHT: 55 IN

## 2019-11-12 DIAGNOSIS — R10.32 LEFT LOWER QUADRANT ABDOMINAL PAIN: Primary | ICD-10-CM

## 2019-11-12 DIAGNOSIS — K57.92 DIVERTICULITIS: ICD-10-CM

## 2019-11-12 DIAGNOSIS — F41.9 ANXIETY: ICD-10-CM

## 2019-11-12 LAB
BASOPHILS # BLD: 0.1 K/UL (ref 0–0.1)
BASOPHILS NFR BLD: 1 % (ref 0–1)
DIFFERENTIAL METHOD BLD: ABNORMAL
EOSINOPHIL # BLD: 0.3 K/UL (ref 0–0.4)
EOSINOPHIL NFR BLD: 5 % (ref 0–7)
ERYTHROCYTE [DISTWIDTH] IN BLOOD BY AUTOMATED COUNT: 13.8 % (ref 11.5–14.5)
HCT VFR BLD AUTO: 40.2 % (ref 35–47)
HGB BLD-MCNC: 12 G/DL (ref 11.5–16)
IMM GRANULOCYTES # BLD AUTO: 0 K/UL (ref 0–0.04)
IMM GRANULOCYTES NFR BLD AUTO: 0 % (ref 0–0.5)
LYMPHOCYTES # BLD: 1 K/UL (ref 0.8–3.5)
LYMPHOCYTES NFR BLD: 19 % (ref 12–49)
MCH RBC QN AUTO: 27.5 PG (ref 26–34)
MCHC RBC AUTO-ENTMCNC: 29.9 G/DL (ref 30–36.5)
MCV RBC AUTO: 92 FL (ref 80–99)
MONOCYTES # BLD: 0.5 K/UL (ref 0–1)
MONOCYTES NFR BLD: 10 % (ref 5–13)
NEUTS SEG # BLD: 3.6 K/UL (ref 1.8–8)
NEUTS SEG NFR BLD: 65 % (ref 32–75)
NRBC # BLD: 0 K/UL (ref 0–0.01)
NRBC BLD-RTO: 0 PER 100 WBC
PLATELET # BLD AUTO: 283 K/UL (ref 150–400)
PMV BLD AUTO: 10.2 FL (ref 8.9–12.9)
RBC # BLD AUTO: 4.37 M/UL (ref 3.8–5.2)
WBC # BLD AUTO: 5.5 K/UL (ref 3.6–11)

## 2019-11-12 RX ORDER — METRONIDAZOLE 500 MG/1
500 TABLET ORAL 3 TIMES DAILY
Qty: 30 TAB | Refills: 0 | Status: SHIPPED | OUTPATIENT
Start: 2019-11-12 | End: 2019-11-22

## 2019-11-12 RX ORDER — DICYCLOMINE HYDROCHLORIDE 20 MG/1
20 TABLET ORAL EVERY 6 HOURS
COMMUNITY
End: 2019-11-12 | Stop reason: SDUPTHER

## 2019-11-12 RX ORDER — HYDROXYZINE 25 MG/1
12.5 TABLET, FILM COATED ORAL 2 TIMES DAILY
Qty: 30 TAB | Refills: 1 | Status: SHIPPED | OUTPATIENT
Start: 2019-11-12 | End: 2020-03-27

## 2019-11-12 RX ORDER — DICYCLOMINE HYDROCHLORIDE 20 MG/1
20 TABLET ORAL EVERY 6 HOURS
Qty: 30 TAB | Refills: 1 | Status: SHIPPED | OUTPATIENT
Start: 2019-11-12 | End: 2020-03-05 | Stop reason: ALTCHOICE

## 2019-11-12 RX ORDER — CIPROFLOXACIN 500 MG/1
500 TABLET ORAL 2 TIMES DAILY
Qty: 20 TAB | Refills: 0 | Status: SHIPPED | OUTPATIENT
Start: 2019-11-12 | End: 2019-11-22

## 2019-11-12 NOTE — PATIENT INSTRUCTIONS

## 2019-11-12 NOTE — PROGRESS NOTES
Assessment/Plan:     Diagnoses and all orders for this visit:    1. Left lower quadrant abdominal pain    2. Diverticulitis  -     ciprofloxacin HCl (CIPRO) 500 mg tablet; Take 1 Tab by mouth two (2) times a day for 10 days. -     metroNIDAZOLE (FLAGYL) 500 mg tablet; Take 1 Tab by mouth three (3) times daily for 10 days. -     dicyclomine (BENTYL) 20 mg tablet; Take 1 Tab by mouth every six (6) hours. -     CBC WITH AUTOMATED DIFF; Future  - Mildly improved, stop augmentin as has allergy. Start Cipro and metronidazole as directed. Do not take hydroxyzine while on antibiotics. Take dicyclomine as directed. Side effects discussed. RTC as needed. Follow up with GI 12/2. Reasons to seek urgent care discussed. 3. Anxiety  -     hydrOXYzine HCl (ATARAX) 25 mg tablet; Take 0.5 Tabs by mouth two (2) times a day. - Stable, refill today, do not take while on antibiotics. Follow-up and Dispositions    · Return for PRN. Discussed expected course/resolution/complications of diagnosis in detail with patient. Medication risks/benefits/costs/interactions/alternatives discussed with patient. Pt was given after visit summary which includes diagnoses, current medications & vitals. Pt expressed understanding with the diagnosis and plan        Subjective:      Grayson Flores is a 80 y.o. female who presents for had concerns including Diverticulitis. Abdominal Pain  Patient complains of abdominal pain. The pain is described as cramping, and is 7/10 in intensity. Pain is located in the LLQ, suprapubic without radiation. Onset was 2 weeks ago. Symptoms have been gradually improving since. Aggravating factors: none. Alleviating factors: medicine from the hospital. Associated symptoms: constipation. The patient denies diarrhea, fever and vomiting. Went to ER about 1.5 weeks ago and had CT scan and was diagnosed with diverticulitis. Has a history of diverticulitis.   Was given Augmentin but she is unable to finish medication, states it made her sick to her stomach. Was also given dicyclomine and that helped. Going to see GI specialist on 12/2. Chart revew shows CT reveals probable mild/early sigmoid diverticulitis without perforation or abscess. Current Outpatient Medications   Medication Sig Dispense Refill    ciprofloxacin HCl (CIPRO) 500 mg tablet Take 1 Tab by mouth two (2) times a day for 10 days. 20 Tab 0    metroNIDAZOLE (FLAGYL) 500 mg tablet Take 1 Tab by mouth three (3) times daily for 10 days. 30 Tab 0    hydrOXYzine HCl (ATARAX) 25 mg tablet Take 0.5 Tabs by mouth two (2) times a day. 30 Tab 1    dicyclomine (BENTYL) 20 mg tablet Take 1 Tab by mouth every six (6) hours. 30 Tab 1    amLODIPine (NORVASC) 10 mg tablet TAKE 1 TABLET BY MOUTH DAILY 90 Tab 1    metoprolol succinate (TOPROL-XL) 50 mg XL tablet Take 1 Tab by mouth two (2) times a day. 180 Tab 1    pantoprazole (PROTONIX) 40 mg tablet TAKE 1 TABLET BY MOUTH EVERY  Tab 0    diclofenac (VOLTAREN) 1 % gel Apply two grams by topical route three times daily to the affected area (s).  losartan (COZAAR) 25 mg tablet TK 1 T PO QD  1       Allergies   Allergen Reactions    Penicillins Swelling    Unable To Obtain Swelling     Past Medical History:   Diagnosis Date    Chronic renal failure     dx'd 2/2013    Diverticulitis     Hyperlipidemia     Hypertension     Peptic ulcer disease     Transient ischemic attack     2003    Urinary tract infection     recurrent      Past Surgical History:   Procedure Laterality Date    HX SKIN BIOPSY      Breast Biopsy benign     History reviewed. No pertinent family history.   Social History     Socioeconomic History    Marital status:      Spouse name: Not on file    Number of children: Not on file    Years of education: Not on file    Highest education level: Not on file   Occupational History    Not on file   Social Needs    Financial resource strain: Not on file    Food insecurity:     Worry: Not on file     Inability: Not on file    Transportation needs:     Medical: Not on file     Non-medical: Not on file   Tobacco Use    Smoking status: Never Smoker    Smokeless tobacco: Never Used   Substance and Sexual Activity    Alcohol use: No     Frequency: Never    Drug use: No    Sexual activity: Never   Lifestyle    Physical activity:     Days per week: Not on file     Minutes per session: Not on file    Stress: Not on file   Relationships    Social connections:     Talks on phone: Not on file     Gets together: Not on file     Attends Christian service: Not on file     Active member of club or organization: Not on file     Attends meetings of clubs or organizations: Not on file     Relationship status: Not on file    Intimate partner violence:     Fear of current or ex partner: Not on file     Emotionally abused: Not on file     Physically abused: Not on file     Forced sexual activity: Not on file   Other Topics Concern    Not on file   Social History Narrative    Not on file       HPI      ROS:   Review of Systems   Constitutional: Negative for chills, fever, malaise/fatigue and weight loss. Respiratory: Negative for cough and shortness of breath. Cardiovascular: Negative for chest pain, palpitations and leg swelling. Gastrointestinal: Positive for abdominal pain, constipation and nausea. Negative for blood in stool, diarrhea, heartburn, melena and vomiting. Genitourinary: Negative for dysuria, flank pain, frequency, hematuria and urgency. Neurological: Negative for dizziness and headaches. Objective:     Visit Vitals  /81 (BP 1 Location: Left arm, BP Patient Position: Sitting)   Pulse 79   Temp 98.6 °F (37 °C) (Oral)   Resp 18   Ht 4' 6\" (1.372 m)   Wt 141 lb (64 kg)   SpO2 98%   BMI 34.00 kg/m²         Vitals and Nurse Documentation reviewed.      Physical Exam   Constitutional: She is oriented to person, place, and time and well-developed, well-nourished, and in no distress. Vital signs are normal. No distress. HENT:   Head: Normocephalic and atraumatic. Cardiovascular: Normal rate, regular rhythm and normal heart sounds. Exam reveals no gallop and no friction rub. No murmur heard. Pulmonary/Chest: Effort normal and breath sounds normal. No respiratory distress. She has no wheezes. She has no rales. Abdominal: Normal appearance and bowel sounds are normal. There is no hepatosplenomegaly. There is tenderness in the suprapubic area and left lower quadrant. There is guarding. Neurological: She is alert and oriented to person, place, and time. Skin: Skin is warm, dry and intact. She is not diaphoretic. No cyanosis. No pallor. Psychiatric: Affect normal. Her mood appears not anxious. She is not agitated. She does not exhibit a depressed mood. She expresses no homicidal and no suicidal ideation.        Results for orders placed or performed in visit on 06/28/19   AMB EXT CREATININE   Result Value Ref Range    Creatinine, External 1.72    AMB EXT URINE MICROALBUMIN   Result Value Ref Range    Urine Microalbumin, External 795.7

## 2020-02-20 RX ORDER — METOPROLOL SUCCINATE 50 MG/1
TABLET, EXTENDED RELEASE ORAL
Qty: 180 TAB | Refills: 1 | Status: SHIPPED | OUTPATIENT
Start: 2020-02-20 | End: 2020-05-18 | Stop reason: SDUPTHER

## 2020-03-05 ENCOUNTER — OFFICE VISIT (OUTPATIENT)
Dept: FAMILY MEDICINE CLINIC | Age: 85
End: 2020-03-05

## 2020-03-05 ENCOUNTER — HOSPITAL ENCOUNTER (OUTPATIENT)
Dept: LAB | Age: 85
Discharge: HOME | End: 2020-03-05

## 2020-03-05 VITALS
WEIGHT: 134.4 LBS | RESPIRATION RATE: 18 BRPM | OXYGEN SATURATION: 99 % | SYSTOLIC BLOOD PRESSURE: 139 MMHG | DIASTOLIC BLOOD PRESSURE: 75 MMHG | BODY MASS INDEX: 28.99 KG/M2 | HEART RATE: 74 BPM | TEMPERATURE: 98.7 F | HEIGHT: 57 IN

## 2020-03-05 DIAGNOSIS — Z00.00 MEDICARE ANNUAL WELLNESS VISIT, SUBSEQUENT: Primary | ICD-10-CM

## 2020-03-05 DIAGNOSIS — E78.00 ELEVATED CHOLESTEROL: ICD-10-CM

## 2020-03-05 DIAGNOSIS — R11.0 NAUSEA: ICD-10-CM

## 2020-03-05 LAB
CHOLEST SERPL-MCNC: 199 MG/DL
HDLC SERPL-MCNC: 57 MG/DL
HDLC SERPL: 3.5 {RATIO} (ref 0–5)
LDLC SERPL CALC-MCNC: 116.6 MG/DL (ref 0–100)
LIPID PROFILE,FLP: ABNORMAL
TRIGL SERPL-MCNC: 127 MG/DL (ref ?–150)
VLDLC SERPL CALC-MCNC: 25.4 MG/DL

## 2020-03-05 RX ORDER — GUAIFENESIN 100 MG/5ML
81 LIQUID (ML) ORAL DAILY
COMMUNITY

## 2020-03-05 RX ORDER — ONDANSETRON 4 MG/1
4 TABLET, ORALLY DISINTEGRATING ORAL
Qty: 20 TAB | Refills: 1 | Status: SHIPPED | OUTPATIENT
Start: 2020-03-05 | End: 2020-05-18 | Stop reason: SDUPTHER

## 2020-03-05 RX ORDER — AMPICILLIN TRIHYDRATE 250 MG
600 CAPSULE ORAL DAILY
COMMUNITY
End: 2022-01-13 | Stop reason: ALTCHOICE

## 2020-03-05 RX ORDER — LINACLOTIDE 145 UG/1
CAPSULE, GELATIN COATED ORAL
COMMUNITY
Start: 2019-12-20 | End: 2020-07-21

## 2020-03-05 NOTE — PROGRESS NOTES
1. Have you been to the ER, urgent care clinic since your last visit? Hospitalized since your last visit? Yes When: October/November 2019 Where: [facility] Reason for visit: URI    2. Have you seen or consulted any other health care providers outside of the [address] [place] [address] since your last visit? Include any pap smears or colon screening.  Yes, Diverticulutis, Dr. Kenroy Joe - GI, December 2019    Chief Complaint   Patient presents with   [address] Visit

## 2020-03-05 NOTE — PATIENT INSTRUCTIONS
Nausea and Vomiting: Care Instructions Your Care Instructions When you are nauseated, you may feel weak and sweaty and notice a lot of saliva in your mouth. Nausea often leads to vomiting. Most of the time you do not need to worry about nausea and vomiting, but they can be signs of other illnesses. Two common causes of nausea and vomiting are stomach flu and food poisoning. Nausea and vomiting from viral stomach flu will usually start to improve within 24 hours. Nausea and vomiting from food poisoning may last from 12 to 48 hours. The doctor has checked you carefully, but problems can develop later. If you notice any problems or new symptoms, get medical treatment right away. Follow-up care is a key part of your treatment and safety. Be sure to make and go to all appointments, and call your doctor if you are having problems. It's also a good idea to know your test results and keep a list of the medicines you take. How can you care for yourself at home? · To prevent dehydration, drink plenty of fluids, enough so that your urine is light yellow or clear like water. Choose water and other caffeine-free clear liquids until you feel better. If you have kidney, heart, or liver disease and have to limit fluids, talk with your doctor before you increase the amount of fluids you drink. · Rest in bed until you feel better. · When you are able to eat, try clear soups, mild foods, and liquids until all symptoms are gone for 12 to 48 hours. Other good choices include dry toast, crackers, cooked cereal, and gelatin dessert, such as Jell-O. When should you call for help? Call 911 anytime you think you may need emergency care. For example, call if: 
  · You passed out (lost consciousness).  
 Call your doctor now or seek immediate medical care if: 
  · You have symptoms of dehydration, such as: 
? Dry eyes and a dry mouth. ? Passing only a little dark urine. ?  Feeling thirstier than usual.  
   · You have new or worsening belly pain.  
  · You have a new or higher fever.  
  · You vomit blood or what looks like coffee grounds.  
 Watch closely for changes in your health, and be sure to contact your doctor if: 
  · You have ongoing nausea and vomiting.  
  · Your vomiting is getting worse.  
  · Your vomiting lasts longer than 2 days.  
  · You are not getting better as expected. Where can you learn more? Go to http://jordyn-alec.info/. Enter [phone] [id number] in the search box to learn more about \"Nausea and Vomiting: Care Instructions. \" Current as of: June 26, 2019 Content Version: 12.2 © 7934-9099 [employer]. Care instructions adapted under license by [employer] (which disclaims liability or warranty for this information). If you have questions about a medical condition or this instruction, always ask your healthcare professional. [address] any warranty or liability for your use of this information. Medicare Wellness Visit, Female The best way to live healthy is to have a lifestyle where you eat a well-balanced diet, exercise regularly, limit alcohol use, and quit all forms of tobacco/nicotine, if applicable. Regular preventive services are another way to keep healthy. Preventive services (vaccines, screening tests, monitoring & exams) can help personalize your care plan, which helps you manage your own care. Screening tests can find health problems at the earliest stages, when they are easiest to treat. [facility] follows the current, evidence-based guidelines published by the [place] [employer] (USPSTF) when recommending preventive services for our patients. Because we follow these guidelines, sometimes recommendations change over time as research supports it.  (For example, mammograms used to be recommended annually. Even though Medicare will still pay for an annual mammogram, the newer guidelines recommend a mammogram every two years for women of average risk). Of course, you and your doctor may decide to screen more often for some diseases, based on your risk and your co-morbidities (chronic disease you are already diagnosed with). Preventive services for you include: - Medicare offers their members a free annual wellness visit, which is time for you and your primary care provider to discuss and plan for your preventive service needs. Take advantage of this benefit every year! 
-All adults over the age of 72 should receive the recommended pneumonia vaccines. Current USPSTF guidelines recommend a series of two vaccines for the best pneumonia protection.  
-All adults should have a flu vaccine yearly and a tetanus vaccine every 10 years.  
-All adults age 48 and older should receive the shingles vaccines (series of two vaccines). -All adults age 38-68 who are overweight should have a diabetes screening test once every three years.  
-All adults born between [birth year] and 1965 should be screened once for Hepatitis C. 
-Other screening tests and preventive services for persons with diabetes include: an eye exam to screen for diabetic retinopathy, a kidney function test, a foot exam, and stricter control over your cholesterol.  
-Cardiovascular screening for adults with routine risk involves an electrocardiogram (ECG) at intervals determined by your doctor.  
-Colorectal cancer screenings should be done for adults age 54-65 with no increased risk factors for colorectal cancer. There are a number of acceptable methods of screening for this type of cancer. Each test has its own benefits and drawbacks. Discuss with your doctor what is most appropriate for you during your annual wellness visit.  The different tests include: colonoscopy (considered the best screening method), a fecal occult blood test, a fecal DNA test, and sigmoidoscopy. 
 
-A bone mass density test is recommended when a woman turns 65 to screen for osteoporosis. This test is only recommended one time, as a screening. Some providers will use this same test as a disease monitoring tool if you already have osteoporosis. -Breast cancer screenings are recommended every other year for women of normal risk, age 54-69. 
-Cervical cancer screenings for women over age 72 are only recommended with certain risk factors. Here is a list of your current Health Maintenance items (your personalized list of preventive services) with a due date: 
Health Maintenance Due Topic Date Due  
 DTaP/Tdap/Td  (1 - Tdap) 06/[birth year]/1944  Glaucoma Screening   06/16/1998  Bone Mineral Density   06/16/1998 Wilmer Wright Annual Well Visit  02/29/2020

## 2020-03-05 NOTE — PROGRESS NOTES
This is the Subsequent Medicare Annual Wellness Exam, performed 12 months or more after the Initial AWV or the last Subsequent AWV    I have reviewed the patient's medical history in detail and updated the computerized patient record. History     Here today for medicare wellness. Has dentures. Last vision exam is up to date. Walks some for exercise. States does not have an appetite. Has lost 7lbs since November. Sees nephrology every 4 months. Nausea / Vomiting  Patient complains of nausea and vomiting. Onset of symptoms was several weeks ago. Patient describes nausea as mild. Vomiting has occurred 1 times over the past week. Vomitus is described as undigested food. Symptoms have been associated with constipation. Patient denies hematemesis, melena, fever, alcohol overuse. Course to date has been symptoms have progressed to a point and plateaued. .  Evaluation to date has been none. Treatment to date has been treated with Linzess for constipation. HTN  BP is 139/75. Checks BP at home and gets normal readings. Takes losartan 25mg and amlodipine 10mg as directed with  No reported side effects. Denies CP, SOB, palpitations or leg swelling. Review of Systems   Constitutional: Negative for chills, fever, malaise/fatigue and weight loss. Eyes: Negative for blurred vision. Respiratory: Negative for cough. Cardiovascular: Negative for chest pain and palpitations. Gastrointestinal: Positive for constipation, nausea and vomiting. Negative for abdominal pain, blood in stool, diarrhea, heartburn and melena. Genitourinary: Negative for dysuria. Musculoskeletal: Negative for joint pain. Skin: Negative for rash. Neurological: Negative for dizziness and headaches. Psychiatric/Behavioral: Negative for substance abuse and suicidal ideas. The patient does not have insomnia.           Patient Active Problem List   Diagnosis Code    Chronic kidney disease (CKD) stage G3b/A3, moderately decreased glomerular filtration rate (GFR) between 30-44 mL/min/1.73 square meter and albuminuria creatinine ratio greater than 300 mg/g ([facility]) N18.3    Severe obesity ([facility]) E66.01     Past Medical History:   Diagnosis Date    Chronic renal failure     dx'd 2/2013    Diverticulitis     Hyperlipidemia     Hypertension     Peptic ulcer disease     Transient ischemic attack     2003    Urinary tract infection     recurrent       Past Surgical History:   Procedure Laterality Date    HX SKIN BIOPSY      Breast Biopsy benign     Current Outpatient Medications   Medication Sig Dispense Refill    aspirin 81 mg chewable tablet Take 81 mg by mouth daily.  ondansetron (ZOFRAN ODT) 4 mg disintegrating tablet Take 1 Tab by mouth every eight (8) hours as needed for Nausea or Vomiting. 20 Tab 1    red yeast rice extract 600 mg cap Take 600 mg by mouth now.  metoprolol succinate (TOPROL-XL) 50 mg XL tablet TAKE 1 TABLET BY MOUTH TWICE DAILY 180 Tab 1    pantoprazole (PROTONIX) 40 mg tablet TAKE 1 TABLET BY MOUTH EVERY DAY 90 Tab 1    hydrOXYzine HCl (ATARAX) 25 mg tablet Take 0.5 Tabs by mouth two (2) times a day. 30 Tab 1    amLODIPine (NORVASC) 10 mg tablet TAKE 1 TABLET BY MOUTH DAILY 90 Tab 1    losartan (COZAAR) 25 mg tablet TK 1 T PO QD  1    LINZESS 145 mcg cap capsule TK 1 C PO QD      diclofenac (VOLTAREN) 1 % gel Apply two grams by topical route three times daily to the affected area (s). Allergies   Allergen Reactions    Penicillins Swelling    Unable To Obtain Swelling       No family history on file.   Social History     Tobacco Use    Smoking status: Never Smoker    Smokeless tobacco: Never Used   Substance Use Topics    Alcohol use: No     Frequency: Never       Depression Risk Factor Screening:     3 most recent PHQ Screens 3/5/2020   Little interest or pleasure in doing things Several days   Feeling down, depressed, irritable, or hopeless Several days   Total Score PHQ 2 2 Alcohol Risk Factor Screening:   Do you average 1 drink per night or more than 7 drinks a week:  No    On any one occasion in the past three months have you have had more than 3 drinks containing alcohol:  No      Functional Ability and Level of Safety:   Hearing: Hearing is good. Activities of Daily Living: The home contains: handrails and grab bars  Patient does total self care    Ambulation: with no difficulty    Fall Risk:  Fall Risk Assessment, last 12 mths 3/5/2020   Able to walk? Yes   Fall in past 12 months? No       Abuse Screen:  Patient is not abused    Cognitive Screening   Has your family/caregiver stated any concerns about your memory: no  Cognitive Screening: Normal - Clock Drawing Test    Physical Exam  Constitutional:       Appearance: Normal appearance. HENT:      Head: Normocephalic and atraumatic. Right Ear: Tympanic membrane normal.      Left Ear: Tympanic membrane normal.      Nose: Nose normal.      Mouth/Throat:      Dentition: Normal dentition. Eyes:      General:         Right eye: No discharge. Left eye: No discharge. Conjunctiva/sclera:      Right eye: Right conjunctiva is not injected. Left eye: Left conjunctiva is not injected. Pupils: Pupils are equal, round, and reactive to light. Neck:      Musculoskeletal: Neck supple. Thyroid: No thyroid mass or thyromegaly. Vascular: No carotid bruit. Cardiovascular:      Rate and Rhythm: Normal rate and regular rhythm. Pulses:           Dorsalis pedis pulses are 2+ on the right side and 2+ on the left side. Posterior tibial pulses are 2+ on the right side and 2+ on the left side. Heart sounds: S1 normal and S2 normal. No murmur. No friction rub. No gallop. Pulmonary:      Breath sounds: Normal breath sounds. Abdominal:      General: Bowel sounds are normal. There is no distension. Palpations: There is no mass. Tenderness: There is no abdominal tenderness. Musculoskeletal: Normal range of motion. Lymphadenopathy:      Cervical: No cervical adenopathy. Skin:     General: Skin is warm and dry. Findings: No rash. Neurological:      Mental Status: She is alert. Sensory: Sensation is intact. Gait: Gait is intact. Gait normal.   Psychiatric:         Mood and Affect: Mood and affect normal.           Patient Care Team   Patient Care Team:  Talia Bazan MD as PCP - General (Family Practice)    Assessment/Plan   Education and counseling provided:  Are appropriate based on today's review and evaluation    Diagnoses and all orders for this visit:    1. Medicare annual wellness visit, subsequent    2. Nausea  -     ondansetron (ZOFRAN ODT) 4 mg disintegrating tablet; Take 1 Tab by mouth every eight (8) hours as needed for Nausea or Vomiting.  - Unchanged, zofran as needed, if nausea continues please follow up with GI    3. Elevated cholesterol  -     LIPID PANEL;  Future  - Presumed stable, labs today        Health Maintenance Due   Topic Date Due    DTaP/Tdap/Td series (1 - Tdap) 06/16/1944    GLAUCOMA SCREENING Q2Y  06/16/1998    Bone Densitometry (Dexa) Screening  06/16/1998

## 2020-03-27 ENCOUNTER — VIRTUAL VISIT (OUTPATIENT)
Dept: FAMILY MEDICINE CLINIC | Age: 85
End: 2020-03-27

## 2020-03-27 VITALS — WEIGHT: 134 LBS | BODY MASS INDEX: 28.91 KG/M2 | HEIGHT: 57 IN

## 2020-03-27 DIAGNOSIS — J40 BRONCHITIS: Primary | ICD-10-CM

## 2020-03-27 RX ORDER — BENZONATATE 100 MG/1
100 CAPSULE ORAL
Qty: 21 CAP | Refills: 0 | Status: SHIPPED | OUTPATIENT
Start: 2020-03-27 | End: 2020-04-03

## 2020-03-27 RX ORDER — AZITHROMYCIN 250 MG/1
TABLET, FILM COATED ORAL
Qty: 6 TAB | Refills: 0 | Status: SHIPPED | OUTPATIENT
Start: 2020-03-27 | End: 2020-04-01

## 2020-03-27 NOTE — PROGRESS NOTES
Leanne Louis  80 y.o. female  6/16/1933  [id number]    [facility] FAMILY MEDICINE  Progress Note     Encounter Date: 3/27/2020    Leanne Louis is a 80 y.o. female who was seen by synchronous (real-time) audio-video technology on 3/27/2020. She and/or her healthcare decision maker is aware that this patient-initiated Telehealth encounter is a billable service, with coverage as determined by her insurance carrier. She  is aware that she may receive a bill and has provided verbal consent to proceed: Yes    I was in the office while conducting this encounter. Patient was 'roomed' via telephone call by [employer] , [address] [place] [address]. Assessment and Plan:     Encounter Diagnoses     ICD-10-CM ICD-9-CM   1. Bronchitis J40 490       1. Bronchitis  - azithromycin (ZITHROMAX) 250 mg tablet; Take 2 tablets today, then take 1 tablet daily  Dispense: 6 Tab; Refill: 0  - benzonatate (TESSALON) 100 mg capsule; Take 1 Cap by mouth three (3) times daily as needed for Cough for up to 7 days. Dispense: 21 Cap; Refill: 0      We discussed the expected course, resolution and complications of the diagnosis(es) in detail. Medication risks, benefits, costs, interactions, and alternatives were discussed as indicated. I advised her to contact the office if her condition worsens, changes or fails to improve as anticipated. She expressed understanding with the diagnosis(es) and plan.      CPT Codes 35574-49272 for Established Patients may apply to this Telehealth Visit  Time-based coding, delete if not needed: I spent at least 15 minutes with this established patient, and >50% of the time was spent counseling and/or coordinating care regarding current illness    Pursuant to the emergency declaration under the [employer] and the [facility], 1135 waiver authority and the [employer] Act, this Virtual  Visit was conducted, with patient's consent, to reduce the patient's risk of exposure to COVID-19 and provide continuity of care for an established patient. Services were provided through a video synchronous discussion virtually to substitute for in-person clinic visit. Electronically Signed: Christina Sanford MD    Current Medications after this visit     Current Outpatient Medications   Medication Sig    azithromycin (ZITHROMAX) 250 mg tablet Take 2 tablets today, then take 1 tablet daily    benzonatate (TESSALON) 100 mg capsule Take 1 Cap by mouth three (3) times daily as needed for Cough for up to 7 days.  LINZESS 145 mcg cap capsule TK 1 C PO QD    aspirin 81 mg chewable tablet Take 81 mg by mouth daily.  ondansetron (ZOFRAN ODT) 4 mg disintegrating tablet Take 1 Tab by mouth every eight (8) hours as needed for Nausea or Vomiting.  red yeast rice extract 600 mg cap Take 600 mg by mouth now.  metoprolol succinate (TOPROL-XL) 50 mg XL tablet TAKE 1 TABLET BY MOUTH TWICE DAILY    pantoprazole (PROTONIX) 40 mg tablet TAKE 1 TABLET BY MOUTH EVERY DAY    amLODIPine (NORVASC) 10 mg tablet TAKE 1 TABLET BY MOUTH DAILY    diclofenac (VOLTAREN) 1 % gel Apply two grams by topical route three times daily to the affected area (s).  losartan (COZAAR) 25 mg tablet TK 1 T PO QD     No current facility-administered medications for this visit. Medications Discontinued During This Encounter   Medication Reason    hydrOXYzine HCl (ATARAX) 25 mg tablet Not A Current Medication     ~~~~~~~~~~~~~~~~~~~~~~~~~~~~~~~~~~~~~~~~~~~~~~    Chief Complaint   Patient presents with    Cough     started tuesday and is unable to sleep       History of Present Illness   Saud Pearson is a 80 y.o. female who presents for:    Cough  Patient present with cc of cough x 4. Patient reports that she has been cough with sputum production, yellow green sputum. She has not taken any medication for the current issue. Cough is worse at night and in the morning.      Review of Systems   Review of Systems   Constitutional: Negative for chills, fever and weight loss. HENT: Positive for congestion. Negative for ear discharge, nosebleeds, sinus pain and sore throat. Respiratory: Positive for cough and sputum production. Negative for shortness of breath, wheezing and stridor. Neurological: Negative for dizziness and headaches. Vitals/Objective:     Due to this being a TeleHealth evaluation, many elements of the physical examination are unable to be assessed. Physical Exam  Constitutional:       General: She is not in acute distress. Appearance: Normal appearance. She is not ill-appearing, toxic-appearing or diaphoretic. HENT:      Head: Normocephalic and atraumatic. Right Ear: External ear normal.      Left Ear: External ear normal.   Eyes:      General:         Right eye: No discharge. Left eye: No discharge. Conjunctiva/sclera: Conjunctivae normal.   Pulmonary:      Effort: Pulmonary effort is normal.   Neurological:      General: No focal deficit present. Mental Status: She is alert and oriented to person, place, and time. Psychiatric:         Mood and Affect: Mood normal.         Behavior: Behavior normal.         Thought Content: Thought content normal.         No results found for this or any previous visit (from the past 24 hour(s)). Disposition     No future appointments. History   Patient's past medical, surgical and family histories were reviewed and updated. Past Medical History:   Diagnosis Date    Chronic renal failure     dx'd 2/2013    Diverticulitis     Hyperlipidemia     Hypertension     Peptic ulcer disease     Transient ischemic attack     2003    Urinary tract infection     recurrent      Past Surgical History:   Procedure Laterality Date    HX SKIN BIOPSY      Breast Biopsy benign     No family history on file.   Social History     Tobacco Use    Smoking status: Never Smoker    Smokeless tobacco: Never Used Substance Use Topics    Alcohol use: No     Frequency: Never    Drug use: No       Allergies     Allergies   Allergen Reactions    Penicillins Swelling

## 2020-04-29 DIAGNOSIS — I10 ESSENTIAL HYPERTENSION: ICD-10-CM

## 2020-04-29 RX ORDER — AMLODIPINE BESYLATE 10 MG/1
TABLET ORAL
Qty: 90 TAB | Refills: 1 | Status: SHIPPED | OUTPATIENT
Start: 2020-04-29 | End: 2020-05-18 | Stop reason: SDUPTHER

## 2020-05-08 RX ORDER — PANTOPRAZOLE SODIUM 40 MG/1
TABLET, DELAYED RELEASE ORAL
Qty: 90 TAB | Refills: 1 | Status: SHIPPED | OUTPATIENT
Start: 2020-05-08 | End: 2020-05-18 | Stop reason: SDUPTHER

## 2020-05-18 DIAGNOSIS — I10 ESSENTIAL HYPERTENSION: ICD-10-CM

## 2020-05-18 DIAGNOSIS — R11.0 NAUSEA: ICD-10-CM

## 2020-05-18 RX ORDER — PANTOPRAZOLE SODIUM 40 MG/1
TABLET, DELAYED RELEASE ORAL
Qty: 90 TAB | Refills: 1 | Status: SHIPPED | OUTPATIENT
Start: 2020-05-18 | End: 2020-07-21 | Stop reason: SDUPTHER

## 2020-05-18 RX ORDER — AMLODIPINE BESYLATE 10 MG/1
TABLET ORAL
Qty: 90 TAB | Refills: 1 | Status: SHIPPED | OUTPATIENT
Start: 2020-05-18 | End: 2021-03-01

## 2020-05-18 RX ORDER — ONDANSETRON 4 MG/1
4 TABLET, ORALLY DISINTEGRATING ORAL
Qty: 20 TAB | Refills: 1 | Status: SHIPPED | OUTPATIENT
Start: 2020-05-18 | End: 2021-08-04 | Stop reason: SDUPTHER

## 2020-05-18 RX ORDER — METOPROLOL SUCCINATE 50 MG/1
TABLET, EXTENDED RELEASE ORAL
Qty: 180 TAB | Refills: 1 | Status: SHIPPED | OUTPATIENT
Start: 2020-05-18 | End: 2020-10-15

## 2020-05-18 NOTE — TELEPHONE ENCOUNTER
Received call from [facility] mail delivery pharmacy that pt would like medications sent to [facility].     Also requesting Dicyclomine and hydoxyzine not listed as current med in chart

## 2020-07-21 ENCOUNTER — OFFICE VISIT (OUTPATIENT)
Dept: FAMILY MEDICINE CLINIC | Age: 85
End: 2020-07-21

## 2020-07-21 VITALS
SYSTOLIC BLOOD PRESSURE: 119 MMHG | RESPIRATION RATE: 16 BRPM | DIASTOLIC BLOOD PRESSURE: 71 MMHG | HEIGHT: 57 IN | OXYGEN SATURATION: 99 % | BODY MASS INDEX: 28.13 KG/M2 | WEIGHT: 130.4 LBS | HEART RATE: 77 BPM | TEMPERATURE: 98.4 F

## 2020-07-21 DIAGNOSIS — F41.9 ANXIETY: ICD-10-CM

## 2020-07-21 DIAGNOSIS — K21.9 GASTROESOPHAGEAL REFLUX DISEASE, ESOPHAGITIS PRESENCE NOT SPECIFIED: ICD-10-CM

## 2020-07-21 DIAGNOSIS — K57.92 DIVERTICULITIS: Primary | ICD-10-CM

## 2020-07-21 RX ORDER — CIPROFLOXACIN 500 MG/1
500 TABLET ORAL 2 TIMES DAILY
Qty: 20 TAB | Refills: 0 | Status: SHIPPED | OUTPATIENT
Start: 2020-07-21 | End: 2020-07-31

## 2020-07-21 RX ORDER — HYDROXYZINE 25 MG/1
12.5 TABLET, FILM COATED ORAL 2 TIMES DAILY
Qty: 30 TAB | Refills: 1 | Status: SHIPPED | OUTPATIENT
Start: 2020-07-21 | End: 2020-12-29 | Stop reason: ALTCHOICE

## 2020-07-21 RX ORDER — METRONIDAZOLE 500 MG/1
500 TABLET ORAL 3 TIMES DAILY
Qty: 30 TAB | Refills: 0 | Status: SHIPPED | OUTPATIENT
Start: 2020-07-21 | End: 2020-07-31

## 2020-07-21 RX ORDER — PANTOPRAZOLE SODIUM 40 MG/1
40 TABLET, DELAYED RELEASE ORAL 2 TIMES DAILY
Qty: 180 TAB | Refills: 1 | Status: SHIPPED | OUTPATIENT
Start: 2020-07-21 | End: 2020-12-13

## 2020-07-21 RX ORDER — SAME BUTANEDISULFONATE/BETAINE 400-600 MG
250 POWDER IN PACKET (EA) ORAL 2 TIMES DAILY
Qty: 20 CAP | Refills: 0 | Status: SHIPPED | OUTPATIENT
Start: 2020-07-21 | End: 2020-07-31

## 2020-07-21 NOTE — PROGRESS NOTES
Identified pt with two pt identifiers(name and ). Reviewed record in preparation for visit and have obtained necessary documentation. Chief Complaint   Patient presents with    Other     Pt states that for poss x3 mos; she experienced loss of apetite         Health Maintenance Due   Topic    DTaP/Tdap/Td series (1 - Tdap)    Shingrix Vaccine Age 50> (1 of 2)    GLAUCOMA SCREENING Q2Y     Bone Densitometry (Dexa) Screening        Coordination of Care Questionnaire:  :   1) Have you been to an emergency room, urgent care, or hospitalized since your last visit? If yes, where when, and reason for visit? Yes, [facility]       2. Have seen or consulted any other health care provider since your last visit? If yes, where when, and reason for visit?  no        Patient is accompanied by self I have received verbal consent from Lenny Workman to discuss any/all medical information while they are present in the room.

## 2020-07-21 NOTE — PROGRESS NOTES
Daryle Martinet  80 y.o. female  6/16/1933  SSX:[id number]    [facility]  Progress Note     Encounter Date: 7/21/2020    Assessment and Plan:     Encounter Diagnoses     ICD-10-CM ICD-9-CM   1. Diverticulitis  K57.92 562.11   2. Gastroesophageal reflux disease, esophagitis presence not specified  K21.9 530.81   3. Anxiety  F41.9 300.00       1. Diverticulitis  Start on abx therapy. Advised to start on probiotic while on antibiotic. - ciprofloxacin HCl (CIPRO) 500 mg tablet; Take 1 Tab by mouth two (2) times a day for 10 days. Dispense: 20 Tab; Refill: 0  - metroNIDAZOLE (FLAGYL) 500 mg tablet; Take 1 Tab by mouth three (3) times daily for 10 days. Dispense: 30 Tab; Refill: 0  - Saccharomyces boulardii (FLORASTOR) 250 mg capsule; Take 1 Cap by mouth two (2) times a day for 10 days. Dispense: 20 Cap; Refill: 0    2. Gastroesophageal reflux disease, esophagitis presence not specified  Patient advised to monitor appetite while on abx therapy and to increased dosage of PPI. - pantoprazole (PROTONIX) 40 mg tablet; Take 1 Tab by mouth two (2) times a day. Dispense: 180 Tab; Refill: 1    3. Anxiety  Patient start back on hydroxyzine  - hydrOXYzine HCL (ATARAX) 25 mg tablet; Take 0.5 Tabs by mouth two (2) times a day. Dispense: 30 Tab; Refill: 1      I have discussed the diagnosis with the patient and the intended plan as seen in the above orders. she has expressed understanding. The patient has received an after-visit summary and questions were answered concerning future plans. I have discussed medication side effects and warnings with the patient as well. Electronically Signed: Adolfo Meade MD    Current Medications after this visit     Current Outpatient Medications   Medication Sig    pantoprazole (PROTONIX) 40 mg tablet Take 1 Tab by mouth two (2) times a day.  ciprofloxacin HCl (CIPRO) 500 mg tablet Take 1 Tab by mouth two (2) times a day for 10 days.     metroNIDAZOLE (FLAGYL) 500 mg tablet Take 1 Tab by mouth three (3) times daily for 10 days.  hydrOXYzine HCL (ATARAX) 25 mg tablet Take 0.5 Tabs by mouth two (2) times a day.  Saccharomyces boulardii (FLORASTOR) 250 mg capsule Take 1 Cap by mouth two (2) times a day for 10 days.  ondansetron (ZOFRAN ODT) 4 mg disintegrating tablet Take 1 Tab by mouth every eight (8) hours as needed for Nausea or Vomiting.  amLODIPine (NORVASC) 10 mg tablet TAKE 1 TABLET BY MOUTH DAILY    metoprolol succinate (TOPROL-XL) 50 mg XL tablet TAKE 1 TABLET BY MOUTH TWICE DAILY    aspirin 81 mg chewable tablet Take 81 mg by mouth daily.  red yeast rice extract 600 mg cap Take 600 mg by mouth now.  diclofenac (VOLTAREN) 1 % gel Apply two grams by topical route three times daily to the affected area (s).  losartan (COZAAR) 25 mg tablet TK 1 T PO QD     No current facility-administered medications for this visit. Medications Discontinued During This Encounter   Medication Reason    LINZESS 145 mcg cap capsule Not A Current Medication    pantoprazole (PROTONIX) 40 mg tablet Reorder     ~~~~~~~~~~~~~~~~~~~~~~~~~~~~~~~~~~~~~~~~~~~~~~    Chief Complaint   Patient presents with    Other     Pt states that for poss x3 mos; she experienced loss of apetite        History provided by patient  History of Present Illness   Aydin Grossman is a 80 y.o. female who presents to clinic today for:    Decreased appetite/diverticulitis  Patient present with cc of decreased appetite x 3 months. Patient states that she has  Had been having heartburn sensation and does not feel like swallowing. Endorses abdominal pain lower left side going to the backside. States that this is similar to prior infection/divertivulitits. Associated with diarrhea. Denies BRBPR or melena. Anxiety Review:  Patient is seen for anxiety disorder. Ongoig symptoms include:  insomnia, racing thoughts, feelings of losing control, difficulty concentrating.  Symptoms worsened after her grandson  in March. Patient denies: psychomotor agitation suicidal ideation, homocidal ideation. Current treatment includes no medication and no other therapies. Reported side effects from the treatment: n/a  Prior treatments: hydroxyzine. 3 most recent PHQ Screens 3/5/2020   Little interest or pleasure in doing things Several days   Feeling down, depressed, irritable, or hopeless Several days   Total Score PHQ 2 2             Health Maintenance  Health Maintenance Due   Topic Date Due    DTaP/Tdap/Td series (1 - Tdap) 1954    Shingrix Vaccine Age 50> (1 of 2) 1983    GLAUCOMA SCREENING Q2Y  1998    Bone Densitometry (Dexa) Screening  1998     Review of Systems   Review of Systems   Constitutional: Positive for weight loss (4 lbs). Negative for chills, diaphoresis, fever and malaise/fatigue. HENT: Negative for congestion, ear discharge and sore throat. Eyes: Negative for double vision, photophobia and discharge. Respiratory: Negative for cough, sputum production, shortness of breath and wheezing. Cardiovascular: Negative for chest pain, palpitations and leg swelling. Gastrointestinal: Positive for abdominal pain, diarrhea, heartburn and nausea. Negative for blood in stool, constipation, melena and vomiting. Genitourinary: Negative for dysuria, frequency, hematuria and urgency. Skin: Negative. Neurological: Negative for dizziness, tremors and headaches. Psychiatric/Behavioral: Negative for depression, substance abuse and suicidal ideas. The patient is nervous/anxious and has insomnia. Vitals/Objective:     Vitals:    20 1407   BP: 119/71   Pulse: 77   Resp: 16   Temp: 98.4 °F (36.9 °C)   TempSrc: Oral   SpO2: 99%   Weight: 130 lb 6.4 oz (59.1 kg)   Height: 4' 9\" (1.448 m)     Body mass index is 28.22 kg/m².     Wt Readings from Last 3 Encounters:   20 130 lb 6.4 oz (59.1 kg)   20 134 lb (60.8 kg)   20 134 lb 6.4 oz (61 kg)       Physical Exam  Constitutional:       General: She is not in acute distress. Appearance: Normal appearance. She is well-developed. She is not diaphoretic. HENT:      Head: Normocephalic and atraumatic. Right Ear: External ear normal.      Left Ear: External ear normal.      Mouth/Throat:      Pharynx: No oropharyngeal exudate or posterior oropharyngeal erythema. Eyes:      General:         Right eye: No discharge. Left eye: No discharge. Conjunctiva/sclera: Conjunctivae normal.   Cardiovascular:      Rate and Rhythm: Normal rate and regular rhythm. Heart sounds: S1 normal and S2 normal. No murmur. Pulmonary:      Effort: Pulmonary effort is normal.      Breath sounds: Normal breath sounds. No rales. Abdominal:      General: Bowel sounds are normal.      Tenderness: There is abdominal tenderness in the suprapubic area and left lower quadrant. There is no right CVA tenderness, left CVA tenderness, guarding or rebound. Negative signs include Mata's sign, Rovsing's sign, McBurney's sign and psoas sign. Hernia: No hernia is present. Musculoskeletal:      Right lower leg: No edema. Left lower leg: No edema. Skin:     General: Skin is warm and dry. Neurological:      Mental Status: She is alert and oriented to person, place, and time. No results found for this or any previous visit (from the past 24 hour(s)). Disposition     Follow-up and Dispositions  ·   Return in about 10 days (around 7/31/2020). No future appointments. History   Patient's past medical, surgical and family histories were reviewed and updated.     Past Medical History:   Diagnosis Date    Chronic renal failure     dx'd 2/2013    Diverticulitis     Hyperlipidemia     Hypertension     Peptic ulcer disease     Transient ischemic attack     2003    Urinary tract infection     recurrent      Past Surgical History:   Procedure Laterality Date    HX BREAST BIOPSY Mahendra     History reviewed. No pertinent family history.   Social History     Tobacco Use    Smoking status: Never Smoker    Smokeless tobacco: Never Used   Substance Use Topics    Alcohol use: No     Frequency: Never    Drug use: No       Allergies     Allergies   Allergen Reactions    Sulfa (Sulfonamide Antibiotics) Swelling and Angioedema    Penicillins Swelling

## 2020-07-21 NOTE — PATIENT INSTRUCTIONS
Probiotic (By mouth)   May increase the number of healthy bacteria in your stomach and intestines. Brand Name(s): 5X Probiotic, Abatinex, Acidophilus Probiotic Blend, Adult Probiotic, Align, BD Lactinex, Bacid, Bacid Probiotic, Bifidonate, BioGaia, BioGaia Gastrus, Culturelle, Culturelle Advanced Immune Defense, Culturelle Digestive Health Probiotic, Culturelle Health & Wellness Probiotic   There may be other brand names for this medicine. When This Medicine Should Not Be Used: This medicine is not right for everyone. Do not use it if you had an allergic reaction to a probiotic, such as acidophilus, bifidobacterium, lactobacillus, saccharomyces, or streptococcus thermophilus. How to Use This Medicine:   Capsule, Delayed Release Capsule, Liquid, Powder, Tablet, Stick, Spray, Chewable Tablet, Coated Tablet, Wafer  · Your doctor will tell you how much medicine to use. Do not use more than directed. · Follow the instructions on the medicine label if you are using this medicine without a prescription. · Tablet or delayed-release capsule: Swallow whole. Do not chew, crush, or break. · Powder:  Be careful to not breathe in the powder, because it may bother your lungs. Do not get the powder on your skin. If you mix the powder in food or liquid, do this right before you take the medicine. Do not mix it and then save it for later. · Chewable tablet or wafer:  Chew it completely before you swallow. · Measure the oral liquid medicine with a marked measuring spoon, oral syringe, or medicine cup. · Missed dose: Take a dose as soon as you remember. If it is almost time for your next dose, wait until then and take a regular dose. Do not take extra medicine to make up for a missed dose. · Some brands must be stored in the refrigerator, and some other brands must be stored at room temperature. Follow the directions on the label. Ask your pharmacist if you are not sure how to store your medicine.   Drugs and Foods to Avoid:      Ask your doctor or pharmacist before using any other medicine, including over-the-counter medicines, vitamins, and herbal products. Warnings While Using This Medicine:   · Different brands of this medicine will have different warnings, because the specific ingredients will be different. Read the label on your medicine carefully. Ask your doctor or pharmacist if you have questions. · Tell your doctor if you are pregnant or breastfeeding, or if you are allergic to milk, lactose, yeast, or soy. · Ask your doctor before you use this medicine if you have a central line (port or catheter), or if you have a fever. · Keep all medicine out of the reach of children. Never share your medicine with anyone. Possible Side Effects While Using This Medicine:   Call your doctor right away if you notice any of these side effects:  · Allergic reaction: Itching or hives, swelling in your face or hands, swelling or tingling in your mouth or throat, chest tightness, trouble breathing  If you notice these less serious side effects, talk with your doctor:   · Mild diarrhea, constipation, nausea, or vomiting  · Mild gas or cramps  If you notice other side effects that you think are caused by this medicine, tell your doctor. Call your doctor for medical advice about side effects. You may report side effects to FDA at 6-328-FDA-0890  © 2017 [facility] Information is for End User's use only and may not be sold, redistributed or otherwise used for commercial purposes. The above information is an  only. It is not intended as medical advice for individual conditions or treatments. Talk to your doctor, nurse or pharmacist before following any medical regimen to see if it is safe and effective for you.

## 2020-10-15 RX ORDER — METOPROLOL SUCCINATE 50 MG/1
TABLET, EXTENDED RELEASE ORAL
Qty: 180 TAB | Refills: 1 | Status: SHIPPED | OUTPATIENT
Start: 2020-10-15 | End: 2021-05-28

## 2020-12-10 DIAGNOSIS — K21.9 GASTROESOPHAGEAL REFLUX DISEASE: ICD-10-CM

## 2020-12-13 RX ORDER — PANTOPRAZOLE SODIUM 40 MG/1
TABLET, DELAYED RELEASE ORAL
Qty: 90 TAB | Refills: 0 | Status: SHIPPED | OUTPATIENT
Start: 2020-12-13 | End: 2021-03-01

## 2020-12-29 ENCOUNTER — OFFICE VISIT (OUTPATIENT)
Dept: FAMILY MEDICINE CLINIC | Age: 85
End: 2020-12-29
Payer: MEDICARE

## 2020-12-29 VITALS
RESPIRATION RATE: 16 BRPM | HEART RATE: 78 BPM | OXYGEN SATURATION: 99 % | SYSTOLIC BLOOD PRESSURE: 109 MMHG | HEIGHT: 57 IN | TEMPERATURE: 97.8 F | DIASTOLIC BLOOD PRESSURE: 61 MMHG | BODY MASS INDEX: 27.61 KG/M2 | WEIGHT: 128 LBS

## 2020-12-29 DIAGNOSIS — R63.0 DECREASED APPETITE: ICD-10-CM

## 2020-12-29 DIAGNOSIS — F32.A ANXIETY AND DEPRESSION: Primary | ICD-10-CM

## 2020-12-29 DIAGNOSIS — F41.9 ANXIETY AND DEPRESSION: Primary | ICD-10-CM

## 2020-12-29 LAB
ALBUMIN SERPL-MCNC: 3.9 G/DL (ref 3.5–5)
ALBUMIN/GLOB SERPL: 1.5 {RATIO} (ref 1.1–2.2)
ALP SERPL-CCNC: 70 U/L (ref 45–117)
ALT SERPL-CCNC: 16 U/L (ref 12–78)
ANION GAP SERPL CALC-SCNC: 7 MMOL/L (ref 5–15)
AST SERPL-CCNC: 14 U/L (ref 15–37)
BASOPHILS # BLD: 0.1 K/UL (ref 0–0.1)
BASOPHILS NFR BLD: 1 % (ref 0–1)
BILIRUB SERPL-MCNC: 0.3 MG/DL (ref 0.2–1)
BUN SERPL-MCNC: 33 MG/DL (ref 6–20)
BUN/CREAT SERPL: 15 (ref 12–20)
CALCIUM SERPL-MCNC: 8.9 MG/DL (ref 8.5–10.1)
CHLORIDE SERPL-SCNC: 113 MMOL/L (ref 97–108)
CO2 SERPL-SCNC: 21 MMOL/L (ref 21–32)
CREAT SERPL-MCNC: 2.18 MG/DL (ref 0.55–1.02)
DIFFERENTIAL METHOD BLD: ABNORMAL
EOSINOPHIL # BLD: 0.2 K/UL (ref 0–0.4)
EOSINOPHIL NFR BLD: 3 % (ref 0–7)
ERYTHROCYTE [DISTWIDTH] IN BLOOD BY AUTOMATED COUNT: 14.3 % (ref 11.5–14.5)
GLOBULIN SER CALC-MCNC: 2.6 G/DL (ref 2–4)
GLUCOSE SERPL-MCNC: 97 MG/DL (ref 65–100)
HCT VFR BLD AUTO: 34.4 % (ref 35–47)
HGB BLD-MCNC: 10.2 G/DL (ref 11.5–16)
IMM GRANULOCYTES # BLD AUTO: 0.1 K/UL (ref 0–0.04)
IMM GRANULOCYTES NFR BLD AUTO: 1 % (ref 0–0.5)
LYMPHOCYTES # BLD: 0.8 K/UL (ref 0.8–3.5)
LYMPHOCYTES NFR BLD: 13 % (ref 12–49)
MCH RBC QN AUTO: 28.2 PG (ref 26–34)
MCHC RBC AUTO-ENTMCNC: 29.7 G/DL (ref 30–36.5)
MCV RBC AUTO: 95 FL (ref 80–99)
MONOCYTES # BLD: 0.5 K/UL (ref 0–1)
MONOCYTES NFR BLD: 8 % (ref 5–13)
NEUTS SEG # BLD: 4.1 K/UL (ref 1.8–8)
NEUTS SEG NFR BLD: 74 % (ref 32–75)
NRBC # BLD: 0 K/UL (ref 0–0.01)
NRBC BLD-RTO: 0 PER 100 WBC
PLATELET # BLD AUTO: 253 K/UL (ref 150–400)
PMV BLD AUTO: 10.4 FL (ref 8.9–12.9)
POTASSIUM SERPL-SCNC: 3.8 MMOL/L (ref 3.5–5.1)
PROT SERPL-MCNC: 6.5 G/DL (ref 6.4–8.2)
RBC # BLD AUTO: 3.62 M/UL (ref 3.8–5.2)
RBC MORPH BLD: ABNORMAL
SODIUM SERPL-SCNC: 141 MMOL/L (ref 136–145)
TSH SERPL DL<=0.05 MIU/L-ACNC: 0.75 UIU/ML (ref 0.36–3.74)
WBC # BLD AUTO: 5.8 K/UL (ref 3.6–11)

## 2020-12-29 PROCEDURE — 1090F PRES/ABSN URINE INCON ASSESS: CPT | Performed by: NURSE PRACTITIONER

## 2020-12-29 PROCEDURE — G8432 DEP SCR NOT DOC, RNG: HCPCS | Performed by: NURSE PRACTITIONER

## 2020-12-29 PROCEDURE — 1101F PT FALLS ASSESS-DOCD LE1/YR: CPT | Performed by: NURSE PRACTITIONER

## 2020-12-29 PROCEDURE — G8419 CALC BMI OUT NRM PARAM NOF/U: HCPCS | Performed by: NURSE PRACTITIONER

## 2020-12-29 PROCEDURE — 99213 OFFICE O/P EST LOW 20 MIN: CPT | Performed by: NURSE PRACTITIONER

## 2020-12-29 PROCEDURE — G8536 NO DOC ELDER MAL SCRN: HCPCS | Performed by: NURSE PRACTITIONER

## 2020-12-29 PROCEDURE — G8427 DOCREV CUR MEDS BY ELIG CLIN: HCPCS | Performed by: NURSE PRACTITIONER

## 2020-12-29 RX ORDER — MYCOPHENOLATE MOFETIL 250 MG/1
750 CAPSULE ORAL 2 TIMES DAILY
COMMUNITY
Start: 2020-11-25

## 2020-12-29 RX ORDER — LINACLOTIDE 145 UG/1
CAPSULE, GELATIN COATED ORAL
COMMUNITY
Start: 2020-12-16 | End: 2022-01-09

## 2020-12-29 RX ORDER — CEPHALEXIN 500 MG/1
CAPSULE ORAL
COMMUNITY
Start: 2020-12-02 | End: 2022-01-05

## 2020-12-29 RX ORDER — SERTRALINE HYDROCHLORIDE 25 MG/1
25 TABLET, FILM COATED ORAL DAILY
Qty: 30 TAB | Refills: 1 | Status: SHIPPED | OUTPATIENT
Start: 2020-12-29 | End: 2021-02-22

## 2020-12-29 NOTE — PROGRESS NOTES
Jessica Penaloza is a 80 y.o. female who presents to clinic today for the following:    Chief Complaint   Patient presents with    Insomnia    Other     poor appetie       Vitals:    12/29/20 1130   BP: 109/61   Pulse: 78   Resp: 16   Temp: 97.8 °F (36.6 °C)   TempSrc: Oral   SpO2: 99%   Weight: 128 lb (58.1 kg)   Height: 4' 9\" (1.448 m)       Body mass index is 27.7 kg/m². Patients past medical, surgical and family histories were reviewed. Allergies and Medications reviewed and updated. Current Outpatient Medications:     Linzess 145 mcg cap capsule, , Disp: , Rfl:     mycophenolate mofetil (CELLCEPT) 250 mg capsule, , Disp: , Rfl:     sertraline (ZOLOFT) 25 mg tablet, Take 1 Tab by mouth daily for 60 days. Indications: anxiousness associated with depression, Disp: 30 Tab, Rfl: 1    pantoprazole (PROTONIX) 40 mg tablet, TAKE 1 TABLET EVERY DAY, Disp: 90 Tab, Rfl: 0    metoprolol succinate (TOPROL-XL) 50 mg XL tablet, TAKE 1 TABLET TWICE DAILY, Disp: 180 Tab, Rfl: 1    ondansetron (ZOFRAN ODT) 4 mg disintegrating tablet, Take 1 Tab by mouth every eight (8) hours as needed for Nausea or Vomiting., Disp: 20 Tab, Rfl: 1    amLODIPine (NORVASC) 10 mg tablet, TAKE 1 TABLET BY MOUTH DAILY, Disp: 90 Tab, Rfl: 1    aspirin 81 mg chewable tablet, Take 81 mg by mouth daily. , Disp: , Rfl:     red yeast rice extract 600 mg cap, Take 600 mg by mouth now., Disp: , Rfl:     diclofenac (VOLTAREN) 1 % gel, Apply two grams by topical route three times daily to the affected area (s)., Disp: , Rfl:     losartan (COZAAR) 25 mg tablet, TK 1 T PO QD, Disp: , Rfl: 1    cephALEXin (KEFLEX) 500 mg capsule, TK 1 C PO BID, Disp: , Rfl:     Allergies   Allergen Reactions    Sulfa (Sulfonamide Antibiotics) Swelling and Angioedema    Penicillins Swelling       Past Medical History:   Diagnosis Date    Chronic renal failure     dx'd 2/2013    Diverticulitis     Hyperlipidemia     Hypertension     Peptic ulcer disease     Transient ischemic attack     2003    Urinary tract infection     recurrent        Past Surgical History:   Procedure Laterality Date    HX BREAST BIOPSY      B[place]in    IR PTA PERIPHERAL ARTERY  10/8/2020       History reviewed. No pertinent family history. Social History     Socioeconomic History    Marital status:      Spouse name: Not on file    Number of children: Not on file    Years of education: Not on file    Highest education level: Not on file   Occupational History    Not on file   Social Needs    Financial resource strain: Not on file    Food insecurity     Worry: Not on file     Inability: Not on file    Transportation needs     Medical: Not on file     Non-medical: Not on file   Tobacco Use    Smoking status: Never Smoker    Smokeless tobacco: Never Used   Substance and Sexual Activity    Alcohol use: No     Frequency: Never    Drug use: No    Sexual activity: Never   Lifestyle    Physical activity     Days per week: Not on file     Minutes per session: Not on file    Stress: Not on file   Relationships    Social connections     Talks on phone: Not on file     Gets together: Not on file     Attends Protestant service: Not on file     Active member of club or organization: Not on file     Attends meetings of clubs or organizations: Not on file     Relationship status: Not on file    Intimate partner violence     Fear of current or ex partner: Not on file     Emotionally abused: Not on file     Physically abused: Not on file     Forced sexual activity: Not on file   Other Topics Concern    Not on file   Social History Narrative    Not on file           Physical Exam  Constitutional:       Appearance: She is normal weight. HENT:      Head: Normocephalic. Nose: Nose normal.      Mouth/Throat:      Mouth: Mucous membranes are moist.   Eyes:      Pupils: Pupils are equal, round, and reactive to light. Neck:      Musculoskeletal: Normal range of motion. Cardiovascular:      Rate and Rhythm: Normal rate and regular rhythm. Pulses: Normal pulses. Heart sounds: Normal heart sounds. Pulmonary:      Effort: Pulmonary effort is normal.   Abdominal:      General: Abdomen is flat. Musculoskeletal: Normal range of motion. Skin:     General: Skin is warm. Capillary Refill: Capillary refill takes less than 2 seconds. Neurological:      General: No focal deficit present. Mental Status: She is alert. Psychiatric:         Mood and Affect: Mood normal.          Pt seen today for evaluation of decreased appetite and wt loss. Pt also has increased anxiety and depression. Pt reports this has been worse with COVID isolation. Does not feel like eating or cooking. Pt denies any physical pain, no bleeding. Pt drinks ensure on occasion. Pt was taking hydroxazine but has been off of this for months. Pt wanted to try a new medication. Zoloft was prescribed. I have instructed the pt we will gradually increase this as needed and not to just stop as we would need to gradually decrease if needed. Pt will return in 2-3 weeks for follow up. We will follow up with labs when they return. Review of Systems   Constitutional: Positive for weight loss. HENT: Negative. Eyes: Negative. Respiratory: Negative. Cardiovascular: Negative. Gastrointestinal: Negative. Genitourinary: Negative. Musculoskeletal: Negative. Skin: Negative. Neurological: Negative. Endo/Heme/Allergies: Negative. Psychiatric/Behavioral: Positive for depression. The patient is nervous/anxious. No results found. No results found for this or any previous visit (from the past 24 hour(s)). Assessment and Plan:    Encounter Diagnoses   Name Primary?  Anxiety and depression Yes    Decreased appetite                 I have discussed the diagnosis with the patient and the intended plan as seen in the above orders.   she has expressed understanding. The patient has received an after-visit summary and questions were answered concerning future plans. I have discussed medication side effects and warnings with the patient as well. Follow-up and Dispositions    · Return in about 3 weeks (around 1/19/2021) for Follow up.          Electronically Signed: Donna Stone NP

## 2020-12-29 NOTE — PATIENT INSTRUCTIONS
Please take medication as prescribed. Return in 3 weeks for re-evaluation. Do not stop taking medication once started. If you decide to stop we will need to slowly decrease dose. Please call insurance in regards to counseling services. If you need a referral to a psychologist, please call. Please try to eat more, try nutritional shakes. Learning About Anxiety Disorders What are anxiety disorders? Anxiety disorders are a type of medical problem. They cause severe anxiety. When you feel anxious, you feel that something bad is about to happen. This feeling interferes with your life. These disorders include: · Generalized anxiety disorder. You feel worried and stressed about many everyday events and activities. This goes on for several months and disrupts your life on most days. · Panic disorder. You have repeated panic attacks. A panic attack is a sudden, intense fear or anxiety. It may make you feel short of breath. Your heart may pound. · Social anxiety disorder. You feel very anxious about what you will say or do in front of people. For example, you may be scared to talk or eat in public. This problem affects your daily life. · Phobias. You are very scared of a specific object, situation, or activity. For example, you may fear spiders, high places, or small spaces. What are the symptoms? Generalized anxiety disorder Symptoms may include: · Feeling worried and stressed about many things almost every day. · Feeling tired or irritable. You may have a hard time concentrating. · Having headaches or muscle aches. · Having a hard time getting to sleep or staying asleep. Panic disorder You may have repeated panic attacks when there is no reason for feeling afraid. You may change your daily activities because you worry that you will have another attack. Symptoms may include: 
· Intense fear, terror, or anxiety. · Trouble breathing or very fast breathing. · Chest pain or tightness. · A heartbeat that races or is not regular. Social anxiety disorder Symptoms may include: · Fear about a social situation, such as eating in front of others or speaking in public. You may worry a lot. Or you may be afraid that something bad will happen. · Anxiety that can cause you to blush, sweat, and feel shaky. · A heartbeat that is faster than normal. 
· A hard time focusing. Phobias Symptoms may include: · More fear than most people of being around an object, being in a situation, or doing an activity. You might also be stressed about the chance of being around the thing you fear. · Worry about losing control, panicking, fainting, or having physical symptoms like a faster heartbeat when you are around the situation or object. How are these disorders treated? Anxiety disorders can be treated with medicines or counseling. A combination of both may be used. Medicines may include: · Antidepressants. These may help your symptoms by keeping chemicals in your brain in balance. · Benzodiazepines. These may give you short-term relief of your symptoms. Some people use cognitive-behavioral therapy. A therapist helps you learn to change stressful or bad thoughts into helpful thoughts. Lead a healthy lifestyle A healthy lifestyle may help you feel better. · Get at least 30 minutes of exercise on most days of the week. Walking is a good choice. · Eat a healthy diet. Include fruits, vegetables, lean proteins, and whole grains in your diet each day. · Try to go to bed at the same time every night. Try for 8 hours of sleep a night. · Find ways to manage stress. Try relaxation exercises. · Avoid alcohol and illegal drugs. Follow-up care is a key part of your treatment and safety. Be sure to make and go to all appointments, and call your doctor if you are having problems. It's also a good idea to know your test results and keep a list of the medicines you take. Where can you learn more? Go to http://www.gray.com/ Enter G039 in the search box to learn more about \"Learning About Anxiety Disorders. \" Current as of: January 31, 2020               Content Version: 12.6 © 2553-7291 [employer]. Care instructions adapted under license by [employer] (which disclaims liability or warranty for this information). If you have questions about a medical condition or this instruction, always ask your healthcare professional. [address] any warranty or liability for your use of this information. Recovering From Depression: Care Instructions Your Care Instructions Taking good care of yourself is important as you recover from depression. In time, your symptoms will fade as your treatment takes hold. Do not give up. Instead, focus your energy on getting better. Your mood will improve. It just takes some time. Focus on things that can help you feel better, such as being with friends and family, eating well, and getting enough rest. But take things slowly. Do not do too much too soon. You will begin to feel better gradually. Follow-up care is a key part of your treatment and safety. Be sure to make and go to all appointments, and call your doctor if you are having problems. It's also a good idea to know your test results and keep a list of the medicines you take. How can you care for yourself at home? Be realistic · If you have a large task to do, break it up into smaller steps you can handle, and just do what you can. · You may want to put off important decisions until your depression has lifted. If you have plans that will have a major impact on your life, such as marriage, divorce, or a job change, try to wait a bit.  Talk it over with friends and loved ones who can help you look at the overall picture first. 
 · Reaching out to people for help is important. Do not isolate yourself. Let your family and friends help you. Find someone you can trust and confide in, and talk to that person. · Be patient, and be kind to yourself. Remember that depression is not your fault and is not something you can overcome with willpower alone. Treatment is important for depression, just like for any other illness. Feeling better takes time, and your mood will improve little by little. Stay active · Stay busy and get outside. Take a walk, or try some other light exercise. · Talk with your doctor about an exercise program. Exercise can help with mild depression. · Go to a movie or concert. Take part in a [employer] activity or other social gathering. Go to a ball game. · Ask a friend to have dinner with you. Take care of yourself · Eat a balanced diet with plenty of fresh fruits and vegetables, whole grains, and lean protein. If you have lost your appetite, eat small snacks rather than large meals. · Avoid using illegal drugs or marijuana and drinking alcohol. Do not take medicines that have not been prescribed for you. They may interfere with medicines you may be taking for depression, or they may make your depression worse. · Take your medicines exactly as they are prescribed. You may start to feel better within 1 to 3 weeks of taking antidepressant medicine. But it can take as many as 6 to 8 weeks to see more improvement. If you have questions or concerns about your medicines, or if you do not notice any improvement by 3 weeks, talk to your doctor. · Continue to take your medicine after your symptoms improve. Taking your medicine for at least 6 months after you feel better can help keep you from getting depressed again. If this isn't the first time you have been depressed, your doctor may recommend you to take medicine even longer. · If you have any side effects from your medicine, tell your doctor. Many side effects are mild and will go away on their own after you have been taking the medicine for a few weeks. Some may last longer. Talk to your doctor if side effects are bothering you too much. You might be able to try a different medicine. · Continue counseling. It may help prevent depression from returning, especially if you've had multiple episodes of depression. Talk with your counselor if you are having a hard time attending your sessions or you think the sessions aren't working. Don't just stop going. · Get enough sleep. Talk to your doctor if you are having problems sleeping. · Avoid sleeping pills unless they are prescribed by the doctor treating your depression. Sleeping pills may make you groggy during the day, and they may interact with other medicine you are taking. · If you have any other illnesses, such as diabetes, heart disease, or high blood pressure, make sure to continue with your treatment. Tell your doctor about all of the medicines you take, including those with or without a prescription. · If you or someone you know talks about suicide, self-harm, or feeling hopeless, get help right away. Call the [address] [place] [address] at 7-972-378-JKAN (4-705.345.2982) or text HOME to 541660 to access the Crisis Text Line. Consider saving these numbers in your phone. When should you call for help? Call 663 anytime you think you may need emergency care. For example, call if: 
  · You feel like hurting yourself or someone else.  
  · Someone you know has depression and is about to attempt or is attempting suicide. Call your doctor now or seek immediate medical care if: 
  · You hear voices.  
  · Someone you know has depression and: 
? Starts to give away his or her possessions. ? Uses illegal drugs or drinks alcohol heavily. ? Talks or writes about death, including writing suicide notes or talking about guns, knives, or pills. ? Starts to spend a lot of time alone. ? Acts very aggressively or suddenly appears calm. Watch closely for changes in your health, and be sure to contact your doctor if: 
  · You do not get better as expected. Where can you learn more? Go to http://www.gray.com/ Enter A037 in the search box to learn more about \"Recovering From Depression: Care Instructions. \" Current as of: January 31, 2020               Content Version: 12.6 © 8185-0898 [employer]. Care instructions adapted under license by [employer] (which disclaims liability or warranty for this information). If you have questions about a medical condition or this instruction, always ask your healthcare professional. [address] any warranty or liability for your use of this information. Depression Treatment: Care Instructions Your Care Instructions Depression is a condition that affects the way you feel, think, and act. It causes symptoms such as low energy, loss of interest in daily activities, and sadness or grouchiness that goes on for a long time. Depression is very common and affects men and women of all ages. Depression is a medical illness caused by changes in the natural chemicals in your brain. It is not a character flaw, and it does not mean that you are a bad or weak person. It does not mean that you are going crazy. It is important to know that depression can be treated. Medicines, counseling, and self-care can all help. Many people do not get help because they are embarrassed or think that they will get over the depression on their own. But some people do not get better without treatment. Follow-up care is a key part of your treatment and safety. Be sure to make and go to all appointments, and call your doctor if you are having problems. It's also a good idea to know your test results and keep a list of the medicines you take. How can you care for yourself at home? Learn about antidepressant medicines Antidepressant medicines can improve or end the symptoms of depression. You may need to take the medicine for at least 6 months, and often longer. Keep taking your medicine even if you feel better. If you stop taking it too soon, your symptoms may come back or get worse. You may start to feel better within 1 to 3 weeks of taking antidepressant medicine. But it can take as many as 6 to 8 weeks to see more improvement. Talk to your doctor if you have problems with your medicine or if you do not notice any improvement after 3 weeks. Antidepressants can make you feel tired, dizzy, or nervous. Some people have dry mouth, constipation, headaches, sexual problems, an upset stomach, or diarrhea. Many of these side effects are mild and go away on their own after you take the medicine for a few weeks. Some may last longer. Talk to your doctor if side effects bother you too much. You might be able to try a different medicine. If you are pregnant or breastfeeding, talk to your doctor about what medicines you can take. Learn about counseling In many cases, counseling can work as well as medicines to treat mild to moderate depression. Counseling is done by licensed mental health providers, such as psychologists, social workers, and some types of nurses. It can be done in one-on-one sessions or in a group setting. Many people find group sessions helpful. Cognitive-behavioral therapy is a type of counseling. In this treatment therapy, you learn how to see and change unhelpful thinking styles that may be adding to your depression. Counseling and medicines often work well when used together. Here are other things you could try to help with depression: · Get regular exercise. It may help you feel better. · Plan something pleasant for yourself every day. Include activities that you have enjoyed in the past. 
· Get enough sleep. Talk to your doctor if you have problems sleeping. · Eat a balanced diet. If you do not feel hungry, eat small snacks rather than large meals. · Avoid using illegal drugs or marijuana and drinking alcohol. Do not take medicines that have not been prescribed for you. They may interfere with your treatment, or they may make your depression worse. · Spend time with family and friends. It may help to speak openly about your depression with people you trust. 
· Take your medicines exactly as prescribed. Call your doctor if you think you are having a problem with your medicine. · Do not make major life decisions while you are depressed. Depression may change the way you think. You will be able to make better decisions after you feel better. · Think positively. Challenge negative thoughts with statements such as \"I am hopeful\"; \"Things will get better\"; and \"I can ask for the help I need. \" Write down these statements and read them often, even if you don't believe them yet. · Be patient with yourself. It took time for your depression to develop, and it will take time for your symptoms to improve. Do not take on too much or be too hard on yourself. · Learn all you can about depression from written and online materials. · Check out behavioral health classes to learn more about dealing with depression. · If you or someone you know talks about suicide, self-harm, or feeling hopeless, get help right away. Call the [facility] [place] [facility] [employer] at 1-800-273-talk (1-189.993.9113) or text HOME to 631144 to access the Crisis Text Line. Consider saving these numbers in your phone. When should you call for help? Call 911 anytime you think you may need emergency care. For example, call if: 
  · You feel you cannot stop from hurting yourself or someone else. Call your doctor now or seek immediate medical care if: 
  · You hear voices.  
  · You feel much more depressed. Watch closely for changes in your health, and be sure to contact your doctor if: 
  · You are having problems with your depression medicine.  
  · You are not getting better as expected. Where can you learn more? Go to http://www.gray.com/ Enter J359 in the search box to learn more about \"Depression Treatment: Care Instructions. \" Current as of: January 31, 2020               Content Version: 12.6 © 7696-6322 [employer]. Care instructions adapted under license by [employer] (which disclaims liability or warranty for this information). If you have questions about a medical condition or this instruction, always ask your healthcare professional. [address] any warranty or liability for your use of this information.

## 2020-12-29 NOTE — PROGRESS NOTES
Identified pt with two pt identifiers(name and ). Reviewed record in preparation for visit and have obtained necessary documentation. Chief Complaint   Patient presents with    Insomnia    Other     poor appetie        Health Maintenance Due   Topic    DTaP/Tdap/Td series (1 - Tdap)    Shingrix Vaccine Age 50> (1 of 2)    GLAUCOMA SCREENING Q2Y     Bone Densitometry (Dexa) Screening     Flu Vaccine (1)       Visit Vitals  Blood Pressure 109/61 (BP 1 Location: Left arm, BP Patient Position: Sitting)   Pulse 78   Temperature 97.8 °F (36.6 °C) (Oral)   Respiration 16   Height 4' 9\" (1.448 m)   Weight 128 lb (58.1 kg)   Oxygen Saturation 99%   Body Mass Index 27.70 kg/m²         Coordination of Care Questionnaire:  :   1) Have you been to an emergency room, urgent care, or hospitalized since your last visit? YES      2. Have seen or consulted any other health care provider since your last visit? OSVALDO        Patient is accompanied by  I have received verbal consent from Jarret Parra to discuss any/all medical information while they are present in the room.

## 2020-12-31 NOTE — PROGRESS NOTES
I attempted to call 2 times and was hung up on. Your Kidney function is poor and your Hemoglobin is low. I looked at recent labs by your Kidney Doctor and they are similar. Please try to increase your diet intake, follow up with your Kidney Doctor to re-check function and anemia.

## 2021-01-19 ENCOUNTER — OFFICE VISIT (OUTPATIENT)
Dept: FAMILY MEDICINE CLINIC | Age: 86
End: 2021-01-19
Payer: MEDICARE

## 2021-01-19 VITALS
HEIGHT: 57 IN | SYSTOLIC BLOOD PRESSURE: 108 MMHG | BODY MASS INDEX: 27.01 KG/M2 | RESPIRATION RATE: 16 BRPM | TEMPERATURE: 97.8 F | HEART RATE: 83 BPM | WEIGHT: 125.2 LBS | OXYGEN SATURATION: 99 % | DIASTOLIC BLOOD PRESSURE: 72 MMHG

## 2021-01-19 DIAGNOSIS — F32.A ANXIETY AND DEPRESSION: Primary | ICD-10-CM

## 2021-01-19 DIAGNOSIS — F41.9 ANXIETY AND DEPRESSION: Primary | ICD-10-CM

## 2021-01-19 PROCEDURE — 1090F PRES/ABSN URINE INCON ASSESS: CPT | Performed by: NURSE PRACTITIONER

## 2021-01-19 PROCEDURE — G8419 CALC BMI OUT NRM PARAM NOF/U: HCPCS | Performed by: NURSE PRACTITIONER

## 2021-01-19 PROCEDURE — G8432 DEP SCR NOT DOC, RNG: HCPCS | Performed by: NURSE PRACTITIONER

## 2021-01-19 PROCEDURE — G8536 NO DOC ELDER MAL SCRN: HCPCS | Performed by: NURSE PRACTITIONER

## 2021-01-19 PROCEDURE — G8427 DOCREV CUR MEDS BY ELIG CLIN: HCPCS | Performed by: NURSE PRACTITIONER

## 2021-01-19 PROCEDURE — 1101F PT FALLS ASSESS-DOCD LE1/YR: CPT | Performed by: NURSE PRACTITIONER

## 2021-01-19 PROCEDURE — 99213 OFFICE O/P EST LOW 20 MIN: CPT | Performed by: NURSE PRACTITIONER

## 2021-01-19 NOTE — PROGRESS NOTES
Identified pt with two pt identifiers(name and ). Reviewed record in preparation for visit and have obtained necessary documentation.  Chief Complaint   Patient presents with   • Medication Evaluation     follow up        Health Maintenance Due   Topic   • DTaP/Tdap/Td series (1 - Tdap)   • Shingrix Vaccine Age 50> (1 of 2)   • GLAUCOMA SCREENING Q2Y    • Bone Densitometry (Dexa) Screening    • Flu Vaccine (1)   Patient declined flu vaccine    Coordination of Care Questionnaire:  :   1) Have you been to an emergency room, urgent care, or hospitalized since your last visit?  If yes, where when, and reason for visit? Yes, [place] diarticulitis       2. Have seen or consulted any other health care provider since your last visit?   If yes, where when, and reason for visit?  no        Patient is accompanied by self I have received verbal consent from Asia Young to discuss any/all medical information while they are present in the room.

## 2021-01-19 NOTE — PROGRESS NOTES
Laura Traore is a 80 y.o. female who presents to clinic today for the following:    Chief Complaint   Patient presents with    Medication Evaluation     follow up       Vitals:    01/19/21 1057   BP: 108/72   Pulse: 83   Resp: 16   Temp: 97.8 °F (36.6 °C)   TempSrc: Temporal   SpO2: 99%   Weight: 125 lb 3.2 oz (56.8 kg)   Height: 4' 9\" (1.448 m)       Body mass index is 27.09 kg/m². Patients past medical, surgical and family histories were reviewed. Allergies and Medications reviewed and updated. Current Outpatient Medications:     cephALEXin (KEFLEX) 500 mg capsule, TK 1 C PO BID, Disp: , Rfl:     Linzess 145 mcg cap capsule, , Disp: , Rfl:     mycophenolate mofetil (CELLCEPT) 250 mg capsule, , Disp: , Rfl:     sertraline (ZOLOFT) 25 mg tablet, Take 1 Tab by mouth daily for 60 days. Indications: anxiousness associated with depression, Disp: 30 Tab, Rfl: 1    pantoprazole (PROTONIX) 40 mg tablet, TAKE 1 TABLET EVERY DAY, Disp: 90 Tab, Rfl: 0    metoprolol succinate (TOPROL-XL) 50 mg XL tablet, TAKE 1 TABLET TWICE DAILY, Disp: 180 Tab, Rfl: 1    ondansetron (ZOFRAN ODT) 4 mg disintegrating tablet, Take 1 Tab by mouth every eight (8) hours as needed for Nausea or Vomiting., Disp: 20 Tab, Rfl: 1    amLODIPine (NORVASC) 10 mg tablet, TAKE 1 TABLET BY MOUTH DAILY, Disp: 90 Tab, Rfl: 1    aspirin 81 mg chewable tablet, Take 81 mg by mouth daily. , Disp: , Rfl:     red yeast rice extract 600 mg cap, Take 600 mg by mouth now., Disp: , Rfl:     diclofenac (VOLTAREN) 1 % gel, Apply two grams by topical route three times daily to the affected area (s)., Disp: , Rfl:     losartan (COZAAR) 25 mg tablet, TK 1 T PO QD, Disp: , Rfl: 1    Allergies   Allergen Reactions    Sulfa (Sulfonamide Antibiotics) Swelling and Angioedema    Penicillins Swelling       Past Medical History:   Diagnosis Date    Chronic renal failure     dx'd 2/2013    Diverticulitis     Hyperlipidemia     Hypertension     Peptic ulcer disease     Transient ischemic attack     2003    Urinary tract infection     recurrent        Past Surgical History:   Procedure Laterality Date    HX BREAST BIOPSY      [place]    IR PTA PERIPHERAL ARTERY  10/8/2020       History reviewed. No pertinent family history. Social History     Socioeconomic History    Marital status:      Spouse name: Not on file    Number of children: Not on file    Years of education: Not on file    Highest education level: Not on file   Occupational History    Not on file   Social Needs    Financial resource strain: Not on file    Food insecurity     Worry: Not on file     Inability: Not on file    Transportation needs     Medical: Not on file     Non-medical: Not on file   Tobacco Use    Smoking status: Never Smoker    Smokeless tobacco: Never Used   Substance and Sexual Activity    Alcohol use: No     Frequency: Never    Drug use: No    Sexual activity: Never   Lifestyle    Physical activity     Days per week: Not on file     Minutes per session: Not on file    Stress: Not on file   Relationships    Social connections     Talks on phone: Not on file     Gets together: Not on file     Attends Jain service: Not on file     Active member of club or organization: Not on file     Attends meetings of clubs or organizations: Not on file     Relationship status: Not on file    Intimate partner violence     Fear of current or ex partner: Not on file     Emotionally abused: Not on file     Physically abused: Not on file     Forced sexual activity: Not on file   Other Topics Concern    Not on file   Social History Narrative    Not on file           Physical Exam  Vitals signs and nursing note reviewed. Constitutional:       Appearance: She is normal weight. HENT:      Head: Normocephalic. Nose: Nose normal.      Mouth/Throat:      Mouth: Mucous membranes are moist.      Pharynx: Oropharynx is clear.    Eyes:      Pupils: Pupils are equal, round, and reactive to light. Neck:      Musculoskeletal: Normal range of motion. Cardiovascular:      Rate and Rhythm: Normal rate and regular rhythm. Pulses: Normal pulses. Heart sounds: Normal heart sounds. Pulmonary:      Effort: Pulmonary effort is normal.      Breath sounds: Normal breath sounds. Abdominal:      General: Abdomen is flat. Musculoskeletal: Normal range of motion. Skin:     General: Skin is warm. Capillary Refill: Capillary refill takes less than 2 seconds. Neurological:      General: No focal deficit present. Mental Status: She is alert and oriented to person, place, and time. Psychiatric:         Mood and Affect: Mood normal.         Behavior: Behavior normal.          Pt seen in office for follow up after being placed on zoloft for depression. Pt reports doing much better mentally. Pt reports recent hospitalization for Diverticulitits, was kept two days and DC. Feeling better from this, advancing diet. Pt declined to return in 1 month to repeat labs to check hemoglobin. Advised to return if any dizziness, weakness occurs. Review of Systems   Constitutional: Negative for fever and malaise/fatigue. HENT: Negative for congestion, sinus pain and sore throat. Respiratory: Negative for cough and shortness of breath. Cardiovascular: Negative for chest pain. Gastrointestinal: Negative for diarrhea, nausea and vomiting. Genitourinary: Negative for dysuria. Musculoskeletal: Negative. Neurological: Negative for dizziness and headaches. Endo/Heme/Allergies: Negative for environmental allergies. Psychiatric/Behavioral: Positive for depression. No results found. No results found for this or any previous visit (from the past 24 hour(s)). Assessment and Plan:    Encounter Diagnoses   Name Primary?     Anxiety and depression Yes                I have discussed the diagnosis with the patient and the intended plan as seen in the above orders. she has expressed understanding. The patient has received an after-visit summary and questions were answered concerning future plans. I have discussed medication side effects and warnings with the patient as well. Follow-up and Dispositions    · Return if symptoms worsen or fail to improve.          Electronically Signed: Steven Groves NP

## 2021-01-19 NOTE — PATIENT INSTRUCTIONS
Please continue with medication, Zoloft as prescribed. Return in 1-2 months for follow up. Continue with bland diet while GI symptoms improve from Diverticulitis. Recovering From Depression: Care Instructions  Your Care Instructions     Taking good care of yourself is important as you recover from depression. In time, your symptoms will fade as your treatment takes hold. Do not give up. Instead, focus your energy on getting better. Your mood will improve. It just takes some time. Focus on things that can help you feel better, such as being with friends and family, eating well, and getting enough rest. But take things slowly. Do not do too much too soon. You will begin to feel better gradually. Follow-up care is a key part of your treatment and safety. Be sure to make and go to all appointments, and call your doctor if you are having problems. It's also a good idea to know your test results and keep a list of the medicines you take. How can you care for yourself at home? Be realistic  · If you have a large task to do, break it up into smaller steps you can handle, and just do what you can. · You may want to put off important decisions until your depression has lifted. If you have plans that will have a major impact on your life, such as marriage, divorce, or a job change, try to wait a bit. Talk it over with friends and loved ones who can help you look at the overall picture first.  · Reaching out to people for help is important. Do not isolate yourself. Let your family and friends help you. Find someone you can trust and confide in, and talk to that person. · Be patient, and be kind to yourself. Remember that depression is not your fault and is not something you can overcome with willpower alone. Treatment is important for depression, just like for any other illness. Feeling better takes time, and your mood will improve little by little. Stay active  · Stay busy and get outside.  Take a walk, or try some other light exercise. · Talk with your doctor about an exercise program. Exercise can help with mild depression. · Go to a movie or concert. Take part in a Orthodoxy activity or other social gathering. Go to a ball game. · Ask a friend to have dinner with you. Take care of yourself  · Eat a balanced diet with plenty of fresh fruits and vegetables, whole grains, and lean protein. If you have lost your appetite, eat small snacks rather than large meals. · Avoid using illegal drugs or marijuana and drinking alcohol. Do not take medicines that have not been prescribed for you. They may interfere with medicines you may be taking for depression, or they may make your depression worse. · Take your medicines exactly as they are prescribed. You may start to feel better within 1 to 3 weeks of taking antidepressant medicine. But it can take as many as 6 to 8 weeks to see more improvement. If you have questions or concerns about your medicines, or if you do not notice any improvement by 3 weeks, talk to your doctor. · Continue to take your medicine after your symptoms improve. Taking your medicine for at least 6 months after you feel better can help keep you from getting depressed again. If this isn't the first time you have been depressed, your doctor may recommend you to take medicine even longer. · If you have any side effects from your medicine, tell your doctor. Many side effects are mild and will go away on their own after you have been taking the medicine for a few weeks. Some may last longer. Talk to your doctor if side effects are bothering you too much. You might be able to try a different medicine. · Continue counseling. It may help prevent depression from returning, especially if you've had multiple episodes of depression. Talk with your counselor if you are having a hard time attending your sessions or you think the sessions aren't working. Don't just stop going. · Get enough sleep.  Talk to your doctor if you are having problems sleeping. · Avoid sleeping pills unless they are prescribed by the doctor treating your depression. Sleeping pills may make you groggy during the day, and they may interact with other medicine you are taking. · If you have any other illnesses, such as diabetes, heart disease, or high blood pressure, make sure to continue with your treatment. Tell your doctor about all of the medicines you take, including those with or without a prescription. · If you or someone you know talks about suicide, self-harm, or feeling hopeless, get help right away. Call the [address] [place] [address] at 3-279-948-HUVT (3-622.552.5753) or text HOME to 956821 to access the Crisis Text Line. Consider saving these numbers in your phone. When should you call for help? Call 221 anytime you think you may need emergency care. For example, call if:    · You feel like hurting yourself or someone else.     · Someone you know has depression and is about to attempt or is attempting suicide. Call your doctor now or seek immediate medical care if:    · You hear voices.     · Someone you know has depression and:  ? Starts to give away his or her possessions. ? Uses illegal drugs or drinks alcohol heavily. ? Talks or writes about death, including writing suicide notes or talking about guns, knives, or pills. ? Starts to spend a lot of time alone. ? Acts very aggressively or suddenly appears calm. Watch closely for changes in your health, and be sure to contact your doctor if:    · You do not get better as expected. Where can you learn more? Go to http://www.gray.com/  Enter N529 in the search box to learn more about \"Recovering From Depression: Care Instructions. \"  Current as of: January 31, 2020               Content Version: 12.6  © 9293-5901 [employer].    Care instructions adapted under license by [employer] (which disclaims liability or warranty for this information). If you have questions about a medical condition or this instruction, always ask your healthcare professional. [address] any warranty or liability for your use of this information. Diverticulitis: Care Instructions  Overview     Diverticulitis occurs when pouches form in the wall of the colon and become inflamed or infected. It can be very painful. Doctors aren't sure what causes diverticulitis. There is no proof that foods such as nuts, seeds, or berries cause it or make it worse. A low-fiber diet may cause the colon to work harder to push stool forward. Pouches may form because of this extra work. It may be hard to think about healthy eating while you're in pain. But as you recover, you might think about how you can use healthy eating for overall better health. Healthy eating may help you avoid future attacks. Follow-up care is a key part of your treatment and safety. Be sure to make and go to all appointments, and call your doctor if you are having problems. It's also a good idea to know your test results and keep a list of the medicines you take. How can you care for yourself at home? · Drink plenty of fluids, enough so that your urine is light yellow or clear like water. If you have kidney, heart, or liver disease and have to limit fluids, talk with your doctor before you increase the amount of fluids you drink. · Stay with liquids or a bland diet (plain rice, bananas, dry toast or crackers, applesauce) until you are feeling better. Then you can return to regular foods and slowly increase the amount of fiber in your diet. · Use a heating pad set on low on your belly to relieve mild cramps and pain. · Get extra rest until you are feeling better. · Be safe with medicines. Read and follow all instructions on the label. ? If the doctor gave you a prescription medicine for pain, take it as prescribed.   ? If you are not taking a prescription pain medicine, ask your doctor if you can take an over-the-counter medicine. · If your doctor prescribed antibiotics, take them as directed. Do not stop taking them just because you feel better. You need to take the full course of antibiotics. · Do not use laxatives or enemas unless your doctor tells you to use them. When should you call for help? Call your doctor now or seek immediate medical care if:    · You have a fever.     · You are vomiting.     · You have new or worse belly pain.     · You cannot pass stools or gas. Watch closely for changes in your health, and be sure to contact your doctor if you have any problems. Where can you learn more? Go to http://www.gray.com/  Enter H901 in the search box to learn more about \"Diverticulitis: Care Instructions. \"  Current as of: April 15, 2020               Content Version: 12.6  © 2657-6941 [employer] Care instructions adapted under license by [employer] (which disclaims liability or warranty for this information). If you have questions about a medical condition or this instruction, always ask your healthcare professional. [address] any warranty or liability for your use of this information. Diverticulosis: Care Instructions  Your Care Instructions  In diverticulosis, pouches called diverticula form in the wall of the large intestine (colon). The pouches do not cause any pain or other symptoms. Most people who have diverticulosis do not know they have it. But the pouches sometimes bleed, and if they become infected, they can cause pain and other symptoms. When this happens, it is called diverticulitis. Diverticula form when pressure pushes the wall of the colon outward at certain weak points. A diet that is too low in fiber can cause diverticula. Follow-up care is a key part of your treatment and safety.  Be sure to make and go to all appointments, and call your doctor if you are having problems. It's also a good idea to know your test results and keep a list of the medicines you take. How can you care for yourself at home? · Include fruits, leafy green vegetables, beans, and whole grains in your diet each day. These foods are high in fiber. · Take a fiber supplement, such as Citrucel or Metamucil, every day if needed. Read and follow all instructions on the label. · Drink plenty of fluids, enough so that your urine is light yellow or clear like water. If you have kidney, heart, or liver disease and have to limit fluids, talk with your doctor before you increase the amount of fluids you drink. · Get at least 30 minutes of exercise on most days of the week. Walking is a good choice. You also may want to do other activities, such as running, swimming, cycling, or playing tennis or team sports. · Cut out foods that cause gas, pain, or other symptoms. When should you call for help? Call your doctor now or seek immediate medical care if:    · You have belly pain.     · You pass maroon or very bloody stools.     · You have a fever.     · You have nausea and vomiting.     · You have unusual changes in your bowel movements or abdominal swelling.     · You have burning pain when you urinate.     · You have abnormal vaginal discharge.     · You have shoulder pain.     · You have cramping pain that does not get better when you have a bowel movement or pass gas.     · You pass gas or stool from your urethra while urinating. Watch closely for changes in your health, and be sure to contact your doctor if you have any problems. Where can you learn more? Go to http://www.gray.com/  Enter [id number] in the search box to learn more about \"Diverticulosis: Care Instructions. \"  Current as of: April 15, 2020               Content Version: 12.6  © 2790-1435 [employer].    Care instructions adapted under license by [employer] (which disclaims liability or warranty for this information). If you have questions about a medical condition or this instruction, always ask your healthcare professional. [place] [address] any warranty or liability for your use of this information.

## 2021-02-22 DIAGNOSIS — F41.9 ANXIETY AND DEPRESSION: ICD-10-CM

## 2021-02-22 DIAGNOSIS — F32.A ANXIETY AND DEPRESSION: ICD-10-CM

## 2021-02-22 RX ORDER — SERTRALINE HYDROCHLORIDE 25 MG/1
TABLET, FILM COATED ORAL
Qty: 30 TAB | Refills: 1 | Status: SHIPPED | OUTPATIENT
Start: 2021-02-22 | End: 2021-04-26

## 2021-02-27 DIAGNOSIS — K21.9 GASTROESOPHAGEAL REFLUX DISEASE: ICD-10-CM

## 2021-02-27 DIAGNOSIS — I10 ESSENTIAL HYPERTENSION: ICD-10-CM

## 2021-03-01 RX ORDER — PANTOPRAZOLE SODIUM 40 MG/1
TABLET, DELAYED RELEASE ORAL
Qty: 90 TAB | Refills: 0 | Status: SHIPPED | OUTPATIENT
Start: 2021-03-01 | End: 2021-04-15

## 2021-03-01 RX ORDER — AMLODIPINE BESYLATE 10 MG/1
TABLET ORAL
Qty: 90 TAB | Refills: 1 | Status: SHIPPED | OUTPATIENT
Start: 2021-03-01 | End: 2021-10-13 | Stop reason: SDUPTHER

## 2021-04-14 DIAGNOSIS — K21.9 GASTROESOPHAGEAL REFLUX DISEASE: ICD-10-CM

## 2021-04-15 RX ORDER — PANTOPRAZOLE SODIUM 40 MG/1
TABLET, DELAYED RELEASE ORAL
Qty: 90 TAB | Refills: 0 | Status: SHIPPED | OUTPATIENT
Start: 2021-04-15 | End: 2021-05-28

## 2021-04-26 DIAGNOSIS — F41.9 ANXIETY AND DEPRESSION: ICD-10-CM

## 2021-04-26 DIAGNOSIS — F32.A ANXIETY AND DEPRESSION: ICD-10-CM

## 2021-04-26 RX ORDER — SERTRALINE HYDROCHLORIDE 25 MG/1
TABLET, FILM COATED ORAL
Qty: 30 TAB | Refills: 1 | Status: SHIPPED | OUTPATIENT
Start: 2021-04-26 | End: 2021-11-12 | Stop reason: SDUPTHER

## 2021-05-27 DIAGNOSIS — K21.9 GASTROESOPHAGEAL REFLUX DISEASE: ICD-10-CM

## 2021-05-28 RX ORDER — PANTOPRAZOLE SODIUM 40 MG/1
TABLET, DELAYED RELEASE ORAL
Qty: 90 TABLET | Refills: 0 | Status: SHIPPED | OUTPATIENT
Start: 2021-05-28 | End: 2021-10-18

## 2021-05-28 RX ORDER — METOPROLOL SUCCINATE 50 MG/1
TABLET, EXTENDED RELEASE ORAL
Qty: 180 TABLET | Refills: 1 | Status: SHIPPED | OUTPATIENT
Start: 2021-05-28 | End: 2022-01-09

## 2021-08-04 ENCOUNTER — HOSPITAL ENCOUNTER (OUTPATIENT)
Dept: GENERAL RADIOLOGY | Age: 86
Discharge: HOME | End: 2021-08-04
Payer: MEDICARE

## 2021-08-04 ENCOUNTER — OFFICE VISIT (OUTPATIENT)
Dept: FAMILY MEDICINE CLINIC | Age: 86
End: 2021-08-04
Payer: MEDICARE

## 2021-08-04 VITALS
RESPIRATION RATE: 16 BRPM | DIASTOLIC BLOOD PRESSURE: 59 MMHG | HEART RATE: 81 BPM | SYSTOLIC BLOOD PRESSURE: 101 MMHG | BODY MASS INDEX: 25.63 KG/M2 | WEIGHT: 118.8 LBS | TEMPERATURE: 97.8 F | OXYGEN SATURATION: 98 % | HEIGHT: 57 IN

## 2021-08-04 DIAGNOSIS — R63.4 WEIGHT LOSS: ICD-10-CM

## 2021-08-04 DIAGNOSIS — R63.0 DECREASED APPETITE: Primary | ICD-10-CM

## 2021-08-04 DIAGNOSIS — R11.0 NAUSEA: ICD-10-CM

## 2021-08-04 DIAGNOSIS — R63.0 DECREASED APPETITE: ICD-10-CM

## 2021-08-04 DIAGNOSIS — N18.32 STAGE 3B CHRONIC KIDNEY DISEASE (HCC): ICD-10-CM

## 2021-08-04 LAB
COMMENT, HOLDF: NORMAL
SAMPLES BEING HELD,HOLD: NORMAL

## 2021-08-04 PROCEDURE — 1101F PT FALLS ASSESS-DOCD LE1/YR: CPT | Performed by: NURSE PRACTITIONER

## 2021-08-04 PROCEDURE — G8432 DEP SCR NOT DOC, RNG: HCPCS | Performed by: NURSE PRACTITIONER

## 2021-08-04 PROCEDURE — 74018 RADEX ABDOMEN 1 VIEW: CPT

## 2021-08-04 PROCEDURE — G8427 DOCREV CUR MEDS BY ELIG CLIN: HCPCS | Performed by: NURSE PRACTITIONER

## 2021-08-04 PROCEDURE — 99214 OFFICE O/P EST MOD 30 MIN: CPT | Performed by: NURSE PRACTITIONER

## 2021-08-04 PROCEDURE — 1090F PRES/ABSN URINE INCON ASSESS: CPT | Performed by: NURSE PRACTITIONER

## 2021-08-04 PROCEDURE — G8536 NO DOC ELDER MAL SCRN: HCPCS | Performed by: NURSE PRACTITIONER

## 2021-08-04 PROCEDURE — G8419 CALC BMI OUT NRM PARAM NOF/U: HCPCS | Performed by: NURSE PRACTITIONER

## 2021-08-04 RX ORDER — ONDANSETRON 4 MG/1
4 TABLET, ORALLY DISINTEGRATING ORAL
Qty: 20 TABLET | Refills: 1 | Status: SHIPPED | OUTPATIENT
Start: 2021-08-04 | End: 2022-01-13 | Stop reason: ALTCHOICE

## 2021-08-04 NOTE — PROGRESS NOTES
Assessment/Plan:     Diagnoses and all orders for this visit:    1. Decreased appetite  -     XR ABD (KUB); Future  -     URINALYSIS W/ RFLX MICROSCOPIC; Future  -     REFERRAL TO GASTROENTEROLOGY  -Unchanged,sent for abdominal x-ray and referral to GI for further treatment and evaluation. Seek urgent care for any bloody stools severe abdominal pain or any vomiting blood    2. Nausea  -     ondansetron (ZOFRAN ODT) 4 mg disintegrating tablet; Take 1 Tablet by mouth every eight (8) hours as needed for Nausea or Vomiting.  -     REFERRAL TO GASTROENTEROLOGY  - Unchanged, may start Zofran to help with nausea and referral to GI for further treatment evaluation    3. Weight loss  -     CBC WITH AUTOMATED DIFF; Future  -     METABOLIC PANEL, COMPREHENSIVE; Future  -     TSH 3RD GENERATION; Future  -     SED RATE (ESR); Future  -     C REACTIVE PROTEIN, QT; Future  -     REFERRAL TO GASTROENTEROLOGY  - Worsening, labs today referral to GI    4. Stage 3b chronic kidney disease ([facility] [address]      Other orders  -     SAMPLES BEING HELD            Discussed expected course/resolution/complications of diagnosis in detail with patient. Medication risks/benefits/costs/interactions/alternatives discussed with patient. Pt was given after visit summary which includes diagnoses, current medications & vitals. Pt expressed understanding with the diagnosis and plan        Subjective:      Kelvin Clay is a 80 y.o. female who presents for had concerns including Nausea and Anorexia. Patient states her bowel habits have been regular, with last BM this morning. Pt denies diarrhea or constipation. States she has occasional abdominal pain rated as a 3-1/82 in the periumbilical and suprapubic region. Positive history of diverculitis and diverticulosis. Was last hospitalized in January with diverticulitis and bloody stools. Nausea / Vomiting  Patient complains of nausea. Onset of symptoms was years ago, patient reports.  Patient describes nausea as increasing, and occurs approx. 3 times per week. Symptoms have been associated with abdominal pain of moderate severity. Patient denies hematemesis, melena, fever. Loss of Appetite  Pt states she lost her appetite in January when she was treated for a urinary tract infection. Denies heartburn, reflux, or indigestion. Diet is well-balanced with oatmeal, vegetables, and high in protein (pork chops, sausage, fried chicken). Current Outpatient Medications   Medication Sig Dispense Refill    ondansetron (ZOFRAN ODT) 4 mg disintegrating tablet Take 1 Tablet by mouth every eight (8) hours as needed for Nausea or Vomiting. 20 Tablet 1    metoprolol succinate (TOPROL-XL) 50 mg XL tablet TAKE 1 TABLET TWICE DAILY 180 Tablet 1    sertraline (ZOLOFT) 25 mg tablet TAKE 1 TABLET BY MOUTH EVERY DAY 30 Tab 1    amLODIPine (NORVASC) 10 mg tablet TAKE 1 TABLET EVERY DAY 90 Tab 1    cephALEXin (KEFLEX) 500 mg capsule TK 1 C PO BID      Linzess 145 mcg cap capsule       mycophenolate mofetil (CELLCEPT) 250 mg capsule       aspirin 81 mg chewable tablet Take 81 mg by mouth daily.  red yeast rice extract 600 mg cap Take 600 mg by mouth now.  diclofenac (VOLTAREN) 1 % gel Apply two grams by topical route three times daily to the affected area (s).       losartan (COZAAR) 25 mg tablet TK 1 T PO QD  1    pantoprazole (PROTONIX) 40 mg tablet TAKE 1 TABLET EVERY DAY (Patient not taking: Reported on 8/4/2021) 90 Tablet 0       Allergies   Allergen Reactions    Sulfa (Sulfonamide Antibiotics) Swelling and Angioedema    Penicillins Swelling     Past Medical History:   Diagnosis Date    Chronic renal failure     dx'd 2/2013    Diverticulitis     Hyperlipidemia     Hypertension     Peptic ulcer disease     Transient ischemic attack     2003    Urinary tract infection     recurrent      Past Surgical History:   Procedure Laterality Date    HX BREAST BIOPSY      Bengin    IR PTA PERIPHERAL ARTERY  10/8/2020     No family history on file. Social History     Socioeconomic History    Marital status:      Spouse name: Not on file    Number of children: Not on file    Years of education: Not on file    Highest education level: Not on file   Occupational History    Not on file   Tobacco Use    Smoking status: Never Smoker    Smokeless tobacco: Never Used   Substance and Sexual Activity    Alcohol use: No    Drug use: No    Sexual activity: Never   Other Topics Concern    Not on file   Social History Narrative    Not on file     Social Determinants of Health     Financial Resource Strain:     Difficulty of Paying Living Expenses:    Food Insecurity:     Worried About Running Out of Food in the Last Year:     [address] in the Last Year:    Transportation Needs:     Lack of Transportation (Medical):  Lack of Transportation (Non-Medical):    Physical Activity:     Days of Exercise per Week:     Minutes of Exercise per Session:    Stress:     Feeling of Stress :    Social Connections:     Frequency of Communication with Friends and Family:     Frequency of Social Gatherings with Friends and Family:     Attends Holiness Services:     Active Member of Clubs or Organizations:     Attends Club or Organization Meetings:     Marital Status:    Intimate Partner Violence:     Fear of Current or Ex-Partner:     Emotionally Abused:     Physically Abused:     Sexually Abused:        HPI      ROS:   Review of Systems   Constitutional: Positive for weight loss. Negative for chills, fever and malaise/fatigue. Eyes: Negative for blurred vision. Respiratory: Negative for cough and shortness of breath. Cardiovascular: Negative for chest pain, palpitations and leg swelling. Gastrointestinal: Positive for nausea. Negative for blood in stool, constipation, diarrhea, heartburn and vomiting. Genitourinary: Negative for dysuria, frequency and urgency.    Neurological: Negative for dizziness and headaches. Objective:     Visit Vitals  BP (!) 101/59 (BP 1 Location: Right upper arm, BP Patient Position: Sitting, BP Cuff Size: Adult)   Pulse 81   Temp 97.8 °F (36.6 °C) (Temporal)   Resp 16   Ht 4' 9\" (1.448 m)   Wt 118 lb 12.8 oz (53.9 kg)   SpO2 98%   BMI 25.71 kg/m²         Vitals and Nurse Documentation reviewed. Physical Exam  Constitutional:       General: She is not in acute distress. Appearance: She is not diaphoretic. HENT:      Head: Normocephalic and atraumatic. Cardiovascular:      Rate and Rhythm: Normal rate and regular rhythm. Heart sounds: Normal heart sounds. No murmur heard. No friction rub. No gallop. Pulmonary:      Effort: Pulmonary effort is normal. No respiratory distress. Breath sounds: Normal breath sounds. No wheezing or rales. Abdominal:      General: Bowel sounds are normal. There is no distension. Palpations: There is no mass. Tenderness: There is abdominal tenderness in the suprapubic area. There is no right CVA tenderness, left CVA tenderness or guarding. Hernia: No hernia is present. Skin:     General: Skin is warm and dry. Coloration: Skin is not pale. Neurological:      Mental Status: She is alert and oriented to person, place, and time. Psychiatric:         Mood and Affect: Affect normal. Mood is not anxious or depressed. Behavior: Behavior is not agitated. Thought Content: Thought content does not include homicidal or suicidal ideation.          Results for orders placed or performed in visit on 08/04/21   URINALYSIS W/ RFLX MICROSCOPIC   Result Value Ref Range    Color YELLOW/STRAW      Appearance CLEAR CLEAR      Specific gravity 1.014 1.003 - 1.030      pH (UA) 5.0 5.0 - 8.0      Protein TRACE (A) Negative mg/dL    Glucose Negative Negative mg/dL    Ketone Negative Negative mg/dL    Bilirubin Negative Negative      Blood TRACE (A) Negative      Urobilinogen 0.2 0.2 - 1.0 EU/dL Nitrites Negative Negative      Leukocyte Esterase MODERATE (A) Negative      WBC 20-50 0 - 4 /hpf    RBC 0-5 0 - 5 /hpf    Epithelial cells FEW FEW /lpf    Bacteria Negative Negative /hpf    Hyaline cast 2-5 0 - 5 /lpf   C REACTIVE PROTEIN, QT   Result Value Ref Range    C-Reactive protein <0.29 0.00 - 0.60 mg/dL   SED RATE (ESR)   Result Value Ref Range    Sed rate, automated 9 0 - 30 mm/hr   TSH 3RD GENERATION   Result Value Ref Range    TSH 0.30 (L) 0.36 - 3.02 uIU/mL   METABOLIC PANEL, COMPREHENSIVE   Result Value Ref Range    Sodium 140 136 - 145 mmol/L    Potassium 4.6 3.5 - 5.1 mmol/L    Chloride 109 (H) 97 - 108 mmol/L    CO2 24 21 - 32 mmol/L    Anion gap 7 5 - 15 mmol/L    Glucose 92 65 - 100 mg/dL    BUN 34 (H) 6 - 20 MG/DL    Creatinine 2.05 (H) 0.55 - 1.02 MG/DL    BUN/Creatinine ratio 17 12 - 20      GFR est AA 28 (L) >60 ml/min/1.73m2    GFR est non-AA 23 (L) >60 ml/min/1.73m2    Calcium 8.8 8.5 - 10.1 MG/DL    Bilirubin, total 0.4 0.2 - 1.0 MG/DL    ALT (SGPT) 20 12 - 78 U/L    AST (SGOT) 12 (L) 15 - 37 U/L    Alk. phosphatase 74 45 - 117 U/L    Protein, total 6.3 (L) 6.4 - 8.2 g/dL    Albumin 4.0 3.5 - 5.0 g/dL    Globulin 2.3 2.0 - 4.0 g/dL    A-G Ratio 1.7 1.1 - 2.2     CBC WITH AUTOMATED DIFF   Result Value Ref Range    WBC 4.9 3.6 - 11.0 K/uL    RBC 3.93 3.80 - 5.20 M/uL    HGB 11.1 (L) 11.5 - 16.0 g/dL    HCT 37.4 35.0 - 47.0 %    MCV 95.2 80.0 - 99.0 FL    MCH 28.2 26.0 - 34.0 PG    MCHC 29.7 (L) 30.0 - 36.5 g/dL    RDW 13.3 11.5 - 14.5 %    PLATELET 553 586 - 090 K/uL    MPV 10.5 8.9 - 12.9 FL    NRBC 0.0 0  WBC    ABSOLUTE NRBC 0.00 0.00 - 0.01 K/uL    NEUTROPHILS 73 32 - 75 %    LYMPHOCYTES 14 12 - 49 %    MONOCYTES 9 5 - 13 %    EOSINOPHILS 3 0 - 7 %    BASOPHILS 1 0 - 1 %    IMMATURE GRANULOCYTES 0 0.0 - 0.5 %    ABS. NEUTROPHILS 3.6 1.8 - 8.0 K/UL    ABS. LYMPHOCYTES 0.7 (L) 0.8 - 3.5 K/UL    ABS. MONOCYTES 0.4 0.0 - 1.0 K/UL    ABS. EOSINOPHILS 0.1 0.0 - 0.4 K/UL    ABS. BASOPHILS 0.1 0.0 - 0.1 K/UL    ABS. IMM. GRANS. 0.0 0.00 - 0.04 K/UL    DF SMEAR SCANNED      RBC COMMENTS MOISÉS CELLS  PRESENT       SAMPLES BEING HELD   Result Value Ref Range    SAMPLES BEING HELD 1UA,1UC     COMMENT        Add-on orders for these samples will be processed based on acceptable specimen integrity and analyte stability, which may vary by analyte.

## 2021-08-04 NOTE — PROGRESS NOTES
Sulma Negrete is a 80 y.o. female      Chief Complaint   Patient presents with    Nausea    Anorexia         1. Have you been to the ER, urgent care clinic since your last visit? Hospitalized since your last visit? Yes Marcelo  Jan'21 Abdominal Pain       2. Have you seen or consulted any other health care providers outside of the [address] [place] [address] since your last visit? Include any pap smears or colon screening.    No

## 2021-08-05 LAB
ALBUMIN SERPL-MCNC: 4 G/DL (ref 3.5–5)
ALBUMIN/GLOB SERPL: 1.7 {RATIO} (ref 1.1–2.2)
ALP SERPL-CCNC: 74 U/L (ref 45–117)
ALT SERPL-CCNC: 20 U/L (ref 12–78)
ANION GAP SERPL CALC-SCNC: 7 MMOL/L (ref 5–15)
APPEARANCE UR: CLEAR
AST SERPL-CCNC: 12 U/L (ref 15–37)
BACTERIA URNS QL MICRO: NEGATIVE /HPF
BASOPHILS # BLD: 0.1 K/UL (ref 0–0.1)
BASOPHILS NFR BLD: 1 % (ref 0–1)
BILIRUB SERPL-MCNC: 0.4 MG/DL (ref 0.2–1)
BILIRUB UR QL: NEGATIVE
BUN SERPL-MCNC: 34 MG/DL (ref 6–20)
BUN/CREAT SERPL: 17 (ref 12–20)
CALCIUM SERPL-MCNC: 8.8 MG/DL (ref 8.5–10.1)
CHLORIDE SERPL-SCNC: 109 MMOL/L (ref 97–108)
CO2 SERPL-SCNC: 24 MMOL/L (ref 21–32)
COLOR UR: ABNORMAL
CREAT SERPL-MCNC: 2.05 MG/DL (ref 0.55–1.02)
CRP SERPL-MCNC: <0.29 MG/DL (ref 0–0.6)
DIFFERENTIAL METHOD BLD: ABNORMAL
EOSINOPHIL # BLD: 0.1 K/UL (ref 0–0.4)
EOSINOPHIL NFR BLD: 3 % (ref 0–7)
EPITH CASTS URNS QL MICRO: ABNORMAL /LPF
ERYTHROCYTE [DISTWIDTH] IN BLOOD BY AUTOMATED COUNT: 13.3 % (ref 11.5–14.5)
ERYTHROCYTE [SEDIMENTATION RATE] IN BLOOD: 9 MM/HR (ref 0–30)
GLOBULIN SER CALC-MCNC: 2.3 G/DL (ref 2–4)
GLUCOSE SERPL-MCNC: 92 MG/DL (ref 65–100)
GLUCOSE UR STRIP.AUTO-MCNC: NEGATIVE MG/DL
HCT VFR BLD AUTO: 37.4 % (ref 35–47)
HGB BLD-MCNC: 11.1 G/DL (ref 11.5–16)
HGB UR QL STRIP: ABNORMAL
HYALINE CASTS URNS QL MICRO: ABNORMAL /LPF (ref 0–5)
IMM GRANULOCYTES # BLD AUTO: 0 K/UL (ref 0–0.04)
IMM GRANULOCYTES NFR BLD AUTO: 0 % (ref 0–0.5)
KETONES UR QL STRIP.AUTO: NEGATIVE MG/DL
LEUKOCYTE ESTERASE UR QL STRIP.AUTO: ABNORMAL
LYMPHOCYTES # BLD: 0.7 K/UL (ref 0.8–3.5)
LYMPHOCYTES NFR BLD: 14 % (ref 12–49)
MCH RBC QN AUTO: 28.2 PG (ref 26–34)
MCHC RBC AUTO-ENTMCNC: 29.7 G/DL (ref 30–36.5)
MCV RBC AUTO: 95.2 FL (ref 80–99)
MONOCYTES # BLD: 0.4 K/UL (ref 0–1)
MONOCYTES NFR BLD: 9 % (ref 5–13)
NEUTS SEG # BLD: 3.6 K/UL (ref 1.8–8)
NEUTS SEG NFR BLD: 73 % (ref 32–75)
NITRITE UR QL STRIP.AUTO: NEGATIVE
NRBC # BLD: 0 K/UL (ref 0–0.01)
NRBC BLD-RTO: 0 PER 100 WBC
PH UR STRIP: 5 [PH] (ref 5–8)
PLATELET # BLD AUTO: 276 K/UL (ref 150–400)
PMV BLD AUTO: 10.5 FL (ref 8.9–12.9)
POTASSIUM SERPL-SCNC: 4.6 MMOL/L (ref 3.5–5.1)
PROT SERPL-MCNC: 6.3 G/DL (ref 6.4–8.2)
PROT UR STRIP-MCNC: ABNORMAL MG/DL
RBC # BLD AUTO: 3.93 M/UL (ref 3.8–5.2)
RBC #/AREA URNS HPF: ABNORMAL /HPF (ref 0–5)
RBC MORPH BLD: ABNORMAL
SODIUM SERPL-SCNC: 140 MMOL/L (ref 136–145)
SP GR UR REFRACTOMETRY: 1.01 (ref 1–1.03)
TSH SERPL DL<=0.05 MIU/L-ACNC: 0.3 UIU/ML (ref 0.36–3.74)
UROBILINOGEN UR QL STRIP.AUTO: 0.2 EU/DL (ref 0.2–1)
WBC # BLD AUTO: 4.9 K/UL (ref 3.6–11)
WBC URNS QL MICRO: ABNORMAL /HPF (ref 0–4)

## 2021-08-09 ENCOUNTER — TELEPHONE (OUTPATIENT)
Dept: FAMILY MEDICINE CLINIC | Age: 86
End: 2021-08-09

## 2021-08-09 NOTE — TELEPHONE ENCOUNTER
----- Message from Romulo Pineda NP sent at 8/9/2021  2:47 PM EDT -----  Abd xray was normal, please plan to see GI specialist as discussed at apt

## 2021-10-13 DIAGNOSIS — I10 ESSENTIAL HYPERTENSION: ICD-10-CM

## 2021-10-13 DIAGNOSIS — K21.9 GASTROESOPHAGEAL REFLUX DISEASE: ICD-10-CM

## 2021-10-13 RX ORDER — AMLODIPINE BESYLATE 10 MG/1
TABLET ORAL
Qty: 90 TABLET | Refills: 1 | Status: SHIPPED | OUTPATIENT
Start: 2021-10-13 | End: 2021-10-14

## 2021-10-13 NOTE — TELEPHONE ENCOUNTER
Please advise      ----- Message from [employer] [facility] sent at 10/13/2021  3:15 PM EDT -----  Regarding: np daniels/ refill  Medication Refill    Caller (if not patient): radhames salgado      Relationship of caller (if not patient): grandson      Best contact number(s): [phone]      Name of medication and dosage if known: amLODIPine (NORVASC) 10 mg tablet       Is patient out of this medication (yes/no): yes      Pharmacy name: [facility]    Pharmacy listed in chart? (yes/no):   Pharmacy phone number: [phone]      Details to clarify the request: 10 day supply since humana is backed up      [facility]
NONE

## 2021-10-14 RX ORDER — AMLODIPINE BESYLATE 10 MG/1
TABLET ORAL
Qty: 90 TABLET | Refills: 1 | Status: SHIPPED | OUTPATIENT
Start: 2021-10-14 | End: 2022-01-05

## 2021-10-18 RX ORDER — PANTOPRAZOLE SODIUM 40 MG/1
TABLET, DELAYED RELEASE ORAL
Qty: 90 TABLET | Refills: 0 | Status: SHIPPED | OUTPATIENT
Start: 2021-10-18

## 2021-11-12 DIAGNOSIS — F41.9 ANXIETY AND DEPRESSION: ICD-10-CM

## 2021-11-12 DIAGNOSIS — F32.A ANXIETY AND DEPRESSION: ICD-10-CM

## 2021-11-12 NOTE — TELEPHONE ENCOUNTER
PCP: Vernon Pond NP    Last appt: 8/4/2021  Future Appointments   Date Time Provider Rigoberto Shorei   1/5/2022 11:00 AM Bibi Singleton MD On license of [facility] BS AMB       Requested Prescriptions     Pending Prescriptions Disp Refills    sertraline (ZOLOFT) 25 mg tablet 30 Tablet 1     Sig: Take 1 Tablet by mouth daily.        Prior labs and Blood pressures:  BP Readings from Last 3 Encounters:   08/04/21 (!) 101/59   01/19/21 108/72   12/29/20 109/61     Lab Results   Component Value Date/Time    Sodium 140 08/04/2021 02:04 PM    Potassium 4.6 08/04/2021 02:04 PM    Chloride 109 (H) 08/04/2021 02:04 PM    CO2 24 08/04/2021 02:04 PM    Anion gap 7 08/04/2021 02:04 PM    Glucose 92 08/04/2021 02:04 PM    BUN 34 (H) 08/04/2021 02:04 PM    Creatinine 2.05 (H) 08/04/2021 02:04 PM    BUN/Creatinine ratio 17 08/04/2021 02:04 PM    GFR est AA 28 (L) 08/04/2021 02:04 PM    GFR est non-AA 23 (L) 08/04/2021 02:04 PM    Calcium 8.8 08/04/2021 02:04 PM     No results found for: HBA1C, DTP4ATNV, DXU6IIGH  Lab Results   Component Value Date/Time    Cholesterol, total 199 03/05/2020 10:57 AM    HDL Cholesterol 57 03/05/2020 10:57 AM    LDL, calculated 116.6 (H) 03/05/2020 10:57 AM    VLDL, calculated 25.4 03/05/2020 10:57 AM    Triglyceride 127 03/05/2020 10:57 AM    CHOL/HDL Ratio 3.5 03/05/2020 10:57 AM     No results found for: VITD3, XQVID2, XQVID3, XQVID, VD3RIA    Lab Results   Component Value Date/Time    TSH 0.30 (L) 08/04/2021 02:04 PM

## 2021-11-15 RX ORDER — SERTRALINE HYDROCHLORIDE 25 MG/1
25 TABLET, FILM COATED ORAL DAILY
Qty: 30 TABLET | Refills: 1 | Status: SHIPPED | OUTPATIENT
Start: 2021-11-15 | End: 2022-01-09

## 2022-01-05 ENCOUNTER — OFFICE VISIT (OUTPATIENT)
Dept: INTERNAL MEDICINE CLINIC | Age: 87
End: 2022-01-05
Payer: MEDICARE

## 2022-01-05 VITALS
HEART RATE: 117 BPM | DIASTOLIC BLOOD PRESSURE: 62 MMHG | HEIGHT: 57 IN | SYSTOLIC BLOOD PRESSURE: 92 MMHG | BODY MASS INDEX: 24.38 KG/M2 | TEMPERATURE: 97.6 F | WEIGHT: 113 LBS | RESPIRATION RATE: 16 BRPM | OXYGEN SATURATION: 100 %

## 2022-01-05 DIAGNOSIS — R11.0 NAUSEA: ICD-10-CM

## 2022-01-05 DIAGNOSIS — G31.84 MCI (MILD COGNITIVE IMPAIRMENT): ICD-10-CM

## 2022-01-05 DIAGNOSIS — R63.4 WEIGHT LOSS: ICD-10-CM

## 2022-01-05 DIAGNOSIS — Z23 ENCOUNTER FOR IMMUNIZATION: ICD-10-CM

## 2022-01-05 DIAGNOSIS — F41.9 ANXIETY: ICD-10-CM

## 2022-01-05 DIAGNOSIS — N18.4 CHRONIC KIDNEY DISEASE, STAGE IV (SEVERE) (HCC): ICD-10-CM

## 2022-01-05 DIAGNOSIS — I10 ESSENTIAL HYPERTENSION: Primary | ICD-10-CM

## 2022-01-05 PROCEDURE — 90732 PPSV23 VACC 2 YRS+ SUBQ/IM: CPT | Performed by: INTERNAL MEDICINE

## 2022-01-05 PROCEDURE — G8510 SCR DEP NEG, NO PLAN REQD: HCPCS | Performed by: INTERNAL MEDICINE

## 2022-01-05 PROCEDURE — G8536 NO DOC ELDER MAL SCRN: HCPCS | Performed by: INTERNAL MEDICINE

## 2022-01-05 PROCEDURE — G8420 CALC BMI NORM PARAMETERS: HCPCS | Performed by: INTERNAL MEDICINE

## 2022-01-05 PROCEDURE — G8427 DOCREV CUR MEDS BY ELIG CLIN: HCPCS | Performed by: INTERNAL MEDICINE

## 2022-01-05 PROCEDURE — 1090F PRES/ABSN URINE INCON ASSESS: CPT | Performed by: INTERNAL MEDICINE

## 2022-01-05 PROCEDURE — 1101F PT FALLS ASSESS-DOCD LE1/YR: CPT | Performed by: INTERNAL MEDICINE

## 2022-01-05 PROCEDURE — G0009 ADMIN PNEUMOCOCCAL VACCINE: HCPCS | Performed by: INTERNAL MEDICINE

## 2022-01-05 PROCEDURE — 99204 OFFICE O/P NEW MOD 45 MIN: CPT | Performed by: INTERNAL MEDICINE

## 2022-01-05 RX ORDER — AMLODIPINE BESYLATE 5 MG/1
5 TABLET ORAL DAILY
Qty: 90 TABLET | Refills: 1 | Status: SHIPPED | OUTPATIENT
Start: 2022-01-05 | End: 2022-01-11

## 2022-01-05 NOTE — PROGRESS NOTES
Arden Christensen (: 1933) is a 80 y.o. female, new patient, here for evaluation of the following chief complaint(s):  Establish Care (having some dizziness)       ASSESSMENT/PLAN:  Below is the assessment and plan developed based on review of pertinent history, physical exam, labs, studies, and medications. 1. Essential hypertension  -     amLODIPine (NORVASC) 5 mg tablet; Take 1 Tablet by mouth daily. , Normal, Disp-90 Tablet, R-1- we are decreasing the amlodipine and will cont with losartan and toprol, goal will be to slowly wean off meds as that may be causing the lightheaded feeling   2. Chronic kidney disease, stage IV (severe) (HCC)  -     METABOLIC PANEL, COMPREHENSIVE; Future  -     CBC WITH AUTOMATED DIFF; [place] managed by Jack Lindsay- is her nausea and weight loss related? 3. Weight loss- will check labs but given chronic nausea will do CT as she cont to lose weight   -     TSH 3RD GENERATION; Future  -     CT ABD PELV WO CONT; Future  4. Nausea- protonix is not helping so if scan and labs are normal will need to go back to GI  -     CT ABD PELV WO CONT; Future  5. Encounter for immunization  -     PNEUMOCOCCAL POLYSACCHARIDE VACCINE, 23-VALENT, ADULT OR IMMUNOSUPPRESSED PT DOSE,  6. Anxiety-she is stable on zoloft and takes it intermittently   7. MCI (mild cognitive impairment)-they are noticing short term not long term- she does forget to take her meds- we said to address physical component first and then can talk about memory testing if needed       No follow-ups on file. SUBJECTIVE/OBJECTIVE:  HPI  Patient is new to me- reviewed notes from previous PCP- was having weight loss and decreased appetite and told to see gastroenterology. TSH at that time was low- XRAY abdomen normal and slight anemia - she had lost 7 pounds since  to   Subjective:   Arden Christensen is a 80 y.o. female with hypertension.     Hypertension ROS: taking medications as instructed, no medication side effects noted, no TIA's, no chest pain on exertion, no dyspnea on exertion, no swelling of ankles. New concerns:  Stable on medicine but she has been dizzy and lightheaded     . CKD_ she sees  and that has been stable    Depression- she is taking zoloft and tolerating medicine ; she has been on it for her nerves       She stays by herself and camera system ; she does her medication     She has been having stomach issues and only taking it when she needs it saw gastroenterology and they wanted her to have linzess - it is stable ; no pain in stomach ; she feels nauseated a lot ; afraid makes her sick ; she is on protonix and that is not helping but does not feel acid   Review of Systems   All other systems reviewed and are negative. Physical Exam  Vitals and nursing note reviewed. Constitutional:       Appearance: She is well-developed. HENT:      Head: Normocephalic and atraumatic. Right Ear: External ear normal.      Left Ear: External ear normal.      Nose: Nose normal.   Eyes:      Conjunctiva/sclera: Conjunctivae normal.   Neck:      Thyroid: No thyroid mass or thyromegaly. Vascular: No carotid bruit. Cardiovascular:      Rate and Rhythm: Normal rate and regular rhythm. Heart sounds: Normal heart sounds. Pulmonary:      Effort: Pulmonary effort is normal.      Breath sounds: Normal breath sounds. Abdominal:      General: Bowel sounds are normal.      Palpations: Abdomen is soft. Musculoskeletal:         General: Normal range of motion. Cervical back: Normal range of motion and neck supple. Skin:     General: Skin is warm and dry. Findings: No abrasion or rash. Neurological:      Mental Status: She is alert and oriented to person, place, and time. Cranial Nerves: No cranial nerve deficit. Sensory: No sensory deficit. Coordination: Coordination normal.   Psychiatric:         Behavior: Behavior normal.         Thought Content:  Thought content normal.         Judgment: Judgment normal.           On this date 01/05/2022 I have spent 45 minutes reviewing previous notes, test results and face to face with the patient discussing the diagnosis and importance of compliance with the treatment plan as well as documenting on the day of the visit. An electronic signature was used to authenticate this note.   -- Delmer Vargas MD

## 2022-01-06 ENCOUNTER — TELEPHONE (OUTPATIENT)
Dept: INTERNAL MEDICINE CLINIC | Age: 87
End: 2022-01-06

## 2022-01-06 LAB
ALBUMIN SERPL-MCNC: 4.7 G/DL (ref 3.5–5)
ALBUMIN/GLOB SERPL: 1.5 {RATIO} (ref 1.1–2.2)
ALP SERPL-CCNC: 88 U/L (ref 45–117)
ALT SERPL-CCNC: 30 U/L (ref 12–78)
ANION GAP SERPL CALC-SCNC: 12 MMOL/L (ref 5–15)
AST SERPL-CCNC: 19 U/L (ref 15–37)
BASOPHILS # BLD: 0 K/UL (ref 0–0.1)
BASOPHILS NFR BLD: 0 % (ref 0–1)
BILIRUB SERPL-MCNC: 0.3 MG/DL (ref 0.2–1)
BUN SERPL-MCNC: 43 MG/DL (ref 6–20)
BUN/CREAT SERPL: 13 (ref 12–20)
CALCIUM SERPL-MCNC: 10 MG/DL (ref 8.5–10.1)
CHLORIDE SERPL-SCNC: 115 MMOL/L (ref 97–108)
CO2 SERPL-SCNC: 15 MMOL/L (ref 21–32)
CREAT SERPL-MCNC: 3.35 MG/DL (ref 0.55–1.02)
DIFFERENTIAL METHOD BLD: ABNORMAL
EOSINOPHIL # BLD: 0 K/UL (ref 0–0.4)
EOSINOPHIL NFR BLD: 0 % (ref 0–7)
ERYTHROCYTE [DISTWIDTH] IN BLOOD BY AUTOMATED COUNT: 13.4 % (ref 11.5–14.5)
GLOBULIN SER CALC-MCNC: 3.1 G/DL (ref 2–4)
GLUCOSE SERPL-MCNC: 141 MG/DL (ref 65–100)
HCT VFR BLD AUTO: 42.1 % (ref 35–47)
HGB BLD-MCNC: 12.7 G/DL (ref 11.5–16)
IMM GRANULOCYTES # BLD AUTO: 0.1 K/UL (ref 0–0.04)
IMM GRANULOCYTES NFR BLD AUTO: 1 % (ref 0–0.5)
LYMPHOCYTES # BLD: 0.7 K/UL (ref 0.8–3.5)
LYMPHOCYTES NFR BLD: 11 % (ref 12–49)
MCH RBC QN AUTO: 28.3 PG (ref 26–34)
MCHC RBC AUTO-ENTMCNC: 30.2 G/DL (ref 30–36.5)
MCV RBC AUTO: 94 FL (ref 80–99)
MONOCYTES # BLD: 0.3 K/UL (ref 0–1)
MONOCYTES NFR BLD: 4 % (ref 5–13)
NEUTS SEG # BLD: 5.2 K/UL (ref 1.8–8)
NEUTS SEG NFR BLD: 84 % (ref 32–75)
NRBC # BLD: 0 K/UL (ref 0–0.01)
NRBC BLD-RTO: 0 PER 100 WBC
PLATELET # BLD AUTO: 190 K/UL (ref 150–400)
PMV BLD AUTO: 11.5 FL (ref 8.9–12.9)
POTASSIUM SERPL-SCNC: 3.8 MMOL/L (ref 3.5–5.1)
PROT SERPL-MCNC: 7.8 G/DL (ref 6.4–8.2)
RBC # BLD AUTO: 4.48 M/UL (ref 3.8–5.2)
RBC MORPH BLD: ABNORMAL
SODIUM SERPL-SCNC: 142 MMOL/L (ref 136–145)
TSH SERPL DL<=0.05 MIU/L-ACNC: 1.51 UIU/ML (ref 0.36–3.74)
WBC # BLD AUTO: 6.3 K/UL (ref 3.6–11)

## 2022-01-06 NOTE — TELEPHONE ENCOUNTER
[facility] called with critical lab value of CO2 15. Dr. Christiano Schmidt notified and she requested that the labs be sent to patient's nephrologist Dr. Bora Omalley. Lab faxed.

## 2022-01-09 ENCOUNTER — APPOINTMENT (OUTPATIENT)
Dept: CT IMAGING | Age: 87
DRG: 391 | End: 2022-01-09
Attending: EMERGENCY MEDICINE
Payer: MEDICARE

## 2022-01-09 ENCOUNTER — APPOINTMENT (OUTPATIENT)
Dept: GENERAL RADIOLOGY | Age: 87
DRG: 391 | End: 2022-01-09
Attending: EMERGENCY MEDICINE
Payer: MEDICARE

## 2022-01-09 ENCOUNTER — HOSPITAL ENCOUNTER (INPATIENT)
Age: 87
LOS: 2 days | Discharge: HOME | DRG: 391 | End: 2022-01-11
Attending: EMERGENCY MEDICINE | Admitting: INTERNAL MEDICINE
Payer: MEDICARE

## 2022-01-09 DIAGNOSIS — K83.8 DILATION OF BILIARY TRACT: ICD-10-CM

## 2022-01-09 DIAGNOSIS — K86.89 DILATION OF PANCREATIC DUCT: ICD-10-CM

## 2022-01-09 DIAGNOSIS — K57.32 DIVERTICULITIS OF LARGE INTESTINE WITHOUT PERFORATION OR ABSCESS WITHOUT BLEEDING: Primary | ICD-10-CM

## 2022-01-09 DIAGNOSIS — U07.1 COVID-19: ICD-10-CM

## 2022-01-09 PROBLEM — I10 HTN (HYPERTENSION), BENIGN: Status: ACTIVE | Noted: 2022-01-09

## 2022-01-09 PROBLEM — F41.9 ANXIETY: Status: ACTIVE | Noted: 2022-01-09

## 2022-01-09 PROBLEM — K57.92 ACUTE DIVERTICULITIS: Status: ACTIVE | Noted: 2022-01-09

## 2022-01-09 PROBLEM — N18.4 CKD (CHRONIC KIDNEY DISEASE) STAGE 4, GFR 15-29 ML/MIN (HCC): Status: ACTIVE | Noted: 2022-01-09

## 2022-01-09 PROBLEM — G31.84 MCI (MILD COGNITIVE IMPAIRMENT): Status: ACTIVE | Noted: 2022-01-09

## 2022-01-09 PROBLEM — K21.9 GERD (GASTROESOPHAGEAL REFLUX DISEASE): Status: ACTIVE | Noted: 2022-01-09

## 2022-01-09 LAB
ALBUMIN SERPL-MCNC: 3.8 G/DL (ref 3.5–5)
ALBUMIN/GLOB SERPL: 1 {RATIO} (ref 1.1–2.2)
ALP SERPL-CCNC: 87 U/L (ref 45–117)
ALT SERPL-CCNC: 35 U/L (ref 12–78)
ANION GAP SERPL CALC-SCNC: 14 MMOL/L (ref 5–15)
APPEARANCE UR: ABNORMAL
AST SERPL-CCNC: 40 U/L (ref 15–37)
BACTERIA URNS QL MICRO: ABNORMAL /HPF
BASOPHILS # BLD: 0 K/UL (ref 0–0.1)
BASOPHILS NFR BLD: 0 % (ref 0–1)
BILIRUB SERPL-MCNC: 0.4 MG/DL (ref 0.2–1)
BILIRUB UR QL: NEGATIVE
BNP SERPL-MCNC: 272 PG/ML
BUN SERPL-MCNC: 54 MG/DL (ref 6–20)
BUN/CREAT SERPL: 19 (ref 12–20)
CALCIUM SERPL-MCNC: 9.5 MG/DL (ref 8.5–10.1)
CHLORIDE SERPL-SCNC: 112 MMOL/L (ref 97–108)
CO2 SERPL-SCNC: 13 MMOL/L (ref 21–32)
COLOR UR: ABNORMAL
COMMENT, HOLDF: NORMAL
COVID-19 RAPID TEST, COVR: DETECTED
CREAT SERPL-MCNC: 2.88 MG/DL (ref 0.55–1.02)
DIFFERENTIAL METHOD BLD: ABNORMAL
EOSINOPHIL # BLD: 0 K/UL (ref 0–0.4)
EOSINOPHIL NFR BLD: 0 % (ref 0–7)
EPITH CASTS URNS QL MICRO: ABNORMAL /LPF
ERYTHROCYTE [DISTWIDTH] IN BLOOD BY AUTOMATED COUNT: 13.9 % (ref 11.5–14.5)
GLOBULIN SER CALC-MCNC: 3.9 G/DL (ref 2–4)
GLUCOSE SERPL-MCNC: 135 MG/DL (ref 65–100)
GLUCOSE UR STRIP.AUTO-MCNC: NEGATIVE MG/DL
HCT VFR BLD AUTO: 40.5 % (ref 35–47)
HGB BLD-MCNC: 12.9 G/DL (ref 11.5–16)
HGB UR QL STRIP: ABNORMAL
IMM GRANULOCYTES # BLD AUTO: 0 K/UL (ref 0–0.04)
IMM GRANULOCYTES NFR BLD AUTO: 0 % (ref 0–0.5)
KETONES UR QL STRIP.AUTO: NEGATIVE MG/DL
LEUKOCYTE ESTERASE UR QL STRIP.AUTO: NEGATIVE
LIPASE SERPL-CCNC: 824 U/L (ref 73–393)
LYMPHOCYTES # BLD: 0.9 K/UL (ref 0.8–3.5)
LYMPHOCYTES NFR BLD: 15 % (ref 12–49)
MAGNESIUM SERPL-MCNC: 2.5 MG/DL (ref 1.6–2.4)
MCH RBC QN AUTO: 28.9 PG (ref 26–34)
MCHC RBC AUTO-ENTMCNC: 31.9 G/DL (ref 30–36.5)
MCV RBC AUTO: 90.6 FL (ref 80–99)
MONOCYTES # BLD: 0.3 K/UL (ref 0–1)
MONOCYTES NFR BLD: 5 % (ref 5–13)
NEUTS SEG # BLD: 5.1 K/UL (ref 1.8–8)
NEUTS SEG NFR BLD: 80 % (ref 32–75)
NITRITE UR QL STRIP.AUTO: NEGATIVE
NRBC # BLD: 0 K/UL (ref 0–0.01)
NRBC BLD-RTO: 0 PER 100 WBC
PH UR STRIP: 5 [PH] (ref 5–8)
PLATELET # BLD AUTO: 196 K/UL (ref 150–400)
PMV BLD AUTO: 10.7 FL (ref 8.9–12.9)
POTASSIUM SERPL-SCNC: 5 MMOL/L (ref 3.5–5.1)
PROT SERPL-MCNC: 7.7 G/DL (ref 6.4–8.2)
PROT UR STRIP-MCNC: 100 MG/DL
RBC # BLD AUTO: 4.47 M/UL (ref 3.8–5.2)
RBC #/AREA URNS HPF: ABNORMAL /HPF (ref 0–5)
SAMPLES BEING HELD,HOLD: NORMAL
SODIUM SERPL-SCNC: 139 MMOL/L (ref 136–145)
SOURCE, COVRS: ABNORMAL
SP GR UR REFRACTOMETRY: 1.01 (ref 1–1.03)
TROPONIN-HIGH SENSITIVITY: 23 NG/L (ref 0–51)
UR CULT HOLD, URHOLD: NORMAL
UROBILINOGEN UR QL STRIP.AUTO: 0.2 EU/DL (ref 0.2–1)
WBC # BLD AUTO: 6.3 K/UL (ref 3.6–11)
WBC URNS QL MICRO: ABNORMAL /HPF (ref 0–4)

## 2022-01-09 PROCEDURE — 80053 COMPREHEN METABOLIC PANEL: CPT

## 2022-01-09 PROCEDURE — 36415 COLL VENOUS BLD VENIPUNCTURE: CPT

## 2022-01-09 PROCEDURE — 83880 ASSAY OF NATRIURETIC PEPTIDE: CPT

## 2022-01-09 PROCEDURE — 83690 ASSAY OF LIPASE: CPT

## 2022-01-09 PROCEDURE — 74011250637 HC RX REV CODE- 250/637

## 2022-01-09 PROCEDURE — 81001 URINALYSIS AUTO W/SCOPE: CPT

## 2022-01-09 PROCEDURE — 71046 X-RAY EXAM CHEST 2 VIEWS: CPT

## 2022-01-09 PROCEDURE — 74011250636 HC RX REV CODE- 250/636: Performed by: EMERGENCY MEDICINE

## 2022-01-09 PROCEDURE — G0378 HOSPITAL OBSERVATION PER HR: HCPCS

## 2022-01-09 PROCEDURE — 99283 EMERGENCY DEPT VISIT LOW MDM: CPT

## 2022-01-09 PROCEDURE — 96375 TX/PRO/DX INJ NEW DRUG ADDON: CPT

## 2022-01-09 PROCEDURE — 96374 THER/PROPH/DIAG INJ IV PUSH: CPT

## 2022-01-09 PROCEDURE — 74011250636 HC RX REV CODE- 250/636: Performed by: INTERNAL MEDICINE

## 2022-01-09 PROCEDURE — 74176 CT ABD & PELVIS W/O CONTRAST: CPT

## 2022-01-09 PROCEDURE — 83735 ASSAY OF MAGNESIUM: CPT

## 2022-01-09 PROCEDURE — 74011000250 HC RX REV CODE- 250: Performed by: INTERNAL MEDICINE

## 2022-01-09 PROCEDURE — 74011250637 HC RX REV CODE- 250/637: Performed by: INTERNAL MEDICINE

## 2022-01-09 PROCEDURE — 87635 SARS-COV-2 COVID-19 AMP PRB: CPT

## 2022-01-09 PROCEDURE — 65270000029 HC RM PRIVATE

## 2022-01-09 PROCEDURE — 85025 COMPLETE CBC W/AUTO DIFF WBC: CPT

## 2022-01-09 PROCEDURE — 84484 ASSAY OF TROPONIN QUANT: CPT

## 2022-01-09 RX ORDER — METRONIDAZOLE 500 MG/100ML
500 INJECTION, SOLUTION INTRAVENOUS ONCE
Status: DISCONTINUED | OUTPATIENT
Start: 2022-01-09 | End: 2022-01-09

## 2022-01-09 RX ORDER — LEVOFLOXACIN 5 MG/ML
750 INJECTION, SOLUTION INTRAVENOUS
Status: COMPLETED | OUTPATIENT
Start: 2022-01-09 | End: 2022-01-09

## 2022-01-09 RX ORDER — POLYETHYLENE GLYCOL 3350 17 G/17G
17 POWDER, FOR SOLUTION ORAL DAILY PRN
Status: DISCONTINUED | OUTPATIENT
Start: 2022-01-09 | End: 2022-01-11 | Stop reason: HOSPADM

## 2022-01-09 RX ORDER — HEPARIN SODIUM 5000 [USP'U]/ML
5000 INJECTION, SOLUTION INTRAVENOUS; SUBCUTANEOUS EVERY 8 HOURS
Status: DISCONTINUED | OUTPATIENT
Start: 2022-01-09 | End: 2022-01-11 | Stop reason: HOSPADM

## 2022-01-09 RX ORDER — SODIUM CHLORIDE 0.9 % (FLUSH) 0.9 %
5-40 SYRINGE (ML) INJECTION EVERY 8 HOURS
Status: DISCONTINUED | OUTPATIENT
Start: 2022-01-09 | End: 2022-01-11 | Stop reason: HOSPADM

## 2022-01-09 RX ORDER — ACETAMINOPHEN 650 MG/1
650 SUPPOSITORY RECTAL
Status: DISCONTINUED | OUTPATIENT
Start: 2022-01-09 | End: 2022-01-10

## 2022-01-09 RX ORDER — ONDANSETRON 4 MG/1
4 TABLET, ORALLY DISINTEGRATING ORAL
Status: COMPLETED | OUTPATIENT
Start: 2022-01-09 | End: 2022-01-09

## 2022-01-09 RX ORDER — ONDANSETRON 4 MG/1
TABLET, ORALLY DISINTEGRATING ORAL
Status: COMPLETED
Start: 2022-01-09 | End: 2022-01-09

## 2022-01-09 RX ORDER — SERTRALINE HYDROCHLORIDE 25 MG/1
25 TABLET, FILM COATED ORAL
COMMUNITY

## 2022-01-09 RX ORDER — LEVOFLOXACIN 5 MG/ML
500 INJECTION, SOLUTION INTRAVENOUS
Status: DISCONTINUED | OUTPATIENT
Start: 2022-01-11 | End: 2022-01-11

## 2022-01-09 RX ORDER — ONDANSETRON 2 MG/ML
4 INJECTION INTRAMUSCULAR; INTRAVENOUS
Status: DISPENSED | OUTPATIENT
Start: 2022-01-09 | End: 2022-01-10

## 2022-01-09 RX ORDER — PROMETHAZINE HYDROCHLORIDE 25 MG/1
12.5 TABLET ORAL
Status: DISCONTINUED | OUTPATIENT
Start: 2022-01-09 | End: 2022-01-10

## 2022-01-09 RX ORDER — GUAIFENESIN/DEXTROMETHORPHAN 100-10MG/5
5 SYRUP ORAL
Status: DISCONTINUED | OUTPATIENT
Start: 2022-01-09 | End: 2022-01-11 | Stop reason: HOSPADM

## 2022-01-09 RX ORDER — ACETAMINOPHEN 325 MG/1
650 TABLET ORAL
Status: DISCONTINUED | OUTPATIENT
Start: 2022-01-09 | End: 2022-01-11 | Stop reason: HOSPADM

## 2022-01-09 RX ORDER — LEVOFLOXACIN 5 MG/ML
500 INJECTION, SOLUTION INTRAVENOUS
Status: DISCONTINUED | OUTPATIENT
Start: 2022-01-09 | End: 2022-01-09

## 2022-01-09 RX ORDER — METOPROLOL SUCCINATE 50 MG/1
50 TABLET, EXTENDED RELEASE ORAL DAILY
COMMUNITY

## 2022-01-09 RX ORDER — METRONIDAZOLE 500 MG/100ML
500 INJECTION, SOLUTION INTRAVENOUS EVERY 12 HOURS
Status: DISCONTINUED | OUTPATIENT
Start: 2022-01-09 | End: 2022-01-10

## 2022-01-09 RX ORDER — ENOXAPARIN SODIUM 100 MG/ML
30 INJECTION SUBCUTANEOUS EVERY 12 HOURS
Status: DISCONTINUED | OUTPATIENT
Start: 2022-01-09 | End: 2022-01-09

## 2022-01-09 RX ORDER — SODIUM CHLORIDE 0.9 % (FLUSH) 0.9 %
5-40 SYRINGE (ML) INJECTION AS NEEDED
Status: DISCONTINUED | OUTPATIENT
Start: 2022-01-09 | End: 2022-01-11 | Stop reason: HOSPADM

## 2022-01-09 RX ORDER — ONDANSETRON 2 MG/ML
4 INJECTION INTRAMUSCULAR; INTRAVENOUS
Status: DISCONTINUED | OUTPATIENT
Start: 2022-01-09 | End: 2022-01-11 | Stop reason: HOSPADM

## 2022-01-09 RX ADMIN — SODIUM CHLORIDE 1000 ML: 9 INJECTION, SOLUTION INTRAVENOUS at 14:49

## 2022-01-09 RX ADMIN — SODIUM BICARBONATE 1.5 ML/KG/HR: 84 INJECTION, SOLUTION INTRAVENOUS at 20:28

## 2022-01-09 RX ADMIN — ONDANSETRON 4 MG: 4 TABLET, ORALLY DISINTEGRATING ORAL at 14:48

## 2022-01-09 RX ADMIN — ACETAMINOPHEN 650 MG: 325 TABLET ORAL at 21:33

## 2022-01-09 RX ADMIN — LEVOFLOXACIN 750 MG: 5 INJECTION, SOLUTION INTRAVENOUS at 20:30

## 2022-01-09 RX ADMIN — Medication 10 ML: at 22:00

## 2022-01-09 RX ADMIN — SODIUM CHLORIDE, PRESERVATIVE FREE 20 MG: 5 INJECTION INTRAVENOUS at 20:33

## 2022-01-09 NOTE — H&P
[address]  [phone]    Admission History and Physical      NAME:       Herve Gross   :       1933   MRN:      [id number]     PCP:      Marius Merlin, MD     Date of service:   2022     Chief  Complaint:  Nausea, vomiting, decreased eating and weakness x several days    History Of Presenting Illness:       Ms. Jessica Steve is a 80 y.o. female who is being admitted for Acute diverticulitis. Ms. Jessica Steve presented to our Emergency Department today complaining of not feeling well. Her daughter tells me she has been feeling progressively weak with a poor appetite. She has not had any abdominal pain, chest pain, cough or SOB. No fever. She is fully vaccinated against CoV-19 and boosted. A CT scan abdomen and pelvis done showed acute diverticulitis and incidental findings suspicious of COVID. She will be admitted for further management. Allergies   Allergen Reactions    Sulfa (Sulfonamide Antibiotics) Swelling and Angioedema    Penicillins Swelling       Prior to Admission medications    Medication Sig Start Date End Date Taking? Authorizing Provider   amLODIPine (NORVASC) 5 mg tablet Take 1 Tablet by mouth daily. 22   Aruna Singleton MD   sertraline (ZOLOFT) 25 mg tablet Take 1 Tablet by mouth daily. 11/15/21   Jason CELESTIN NP   pantoprazole (PROTONIX) 40 mg tablet TAKE 1 TABLET EVERY DAY 10/18/21   Emre Parra NP   ondansetron (ZOFRAN ODT) 4 mg disintegrating tablet Take 1 Tablet by mouth every eight (8) hours as needed for Nausea or Vomiting.  21   Bassam Huston NP   metoprolol succinate (TOPROL-XL) 50 mg XL tablet TAKE 1 TABLET TWICE DAILY 21   Emre Parra NP   Linzess 145 mcg cap capsule  20   Provider, Historical   mycophenolate mofetil (CELLCEPT) 250 mg capsule 11/25/20   Provider, Historical   aspirin 81 mg chewable tablet Take 81 mg by mouth daily. Provider, Historical   red yeast rice extract 600 mg cap Take 600 mg by mouth now. Provider, Historical   losartan (COZAAR) 25 mg tablet TK 1 T PO QD 1/8/19   Provider, Historical       Past Medical History:   Diagnosis Date    Chronic renal failure     dx'd 2/2013    Diverticulitis     Hyperlipidemia     Hypertension     Peptic ulcer disease     Transient ischemic attack     2003    Urinary tract infection     recurrent         Past Surgical History:   Procedure Laterality Date    HX BREAST BIOPSY      Bengin    IR PTA PERIPHERAL ARTERY  10/8/2020       Social History     Tobacco Use    Smoking status: Never Smoker    Smokeless tobacco: Never Used   Substance Use Topics    Alcohol use: No      Family history: neg for stroke      Review of Systems:    Constitutional ROS: no fever, chills, rigors or night sweats  Respiratory ROS: no cough, sputum, hemoptysis, dyspnea or pleuritic pain. Cardiovascular ROS: no chest pain, palpitations, orthopnea, PND or syncope  Endocrine ROS: no polydispsia, polyuria, heat or cold intolerance or major weight change.   Gastrointestinal ROS: no dysphagia, abdominal pain but nausea, vomiting but no diarrhea    Genito-Urinary ROS: no dysuria, frequency, hematuria, retention or flank pain  Musculoskeletal ROS: no joint pain, swelling or muscular tenderness  Neurological ROS: no headache, confusion, focal weakness or any other neurological symptoms  Psychiatric ROS: no depression, anxiety, mood swings  Dermatological ROS: no rash, pruritis, or urticaria  Heme-Lymph ROS: no swollen glands, bleeding    Examination:    Constitutional:    Visit Vitals  /67 (BP 1 Location: Right upper arm, BP Patient Position: Sitting)   Pulse (!) 110   Temp 97.7 °F (36.5 °C)   Resp 18   Ht 4' 9\" (1.448 m)   Wt 51.3 kg (113 lb)   SpO2 98%   BMI 24.45 kg/m²         General:  Weak, frail and ill looking patient in no acute distress  Eyes: Pink conjunctivae, PERRLA with no discharge. Normal eye movements  Ear, Nose, Mouth & Throat: No ottorrhea, rhinorrhea, non tender sinuses, dry mucous membranes  Respiratory:  No accessory muscle use, clear breath sounds without crackles or wheezes  Cardiovascular:  No JVD or murmurs, regular and normal S1, S2 without thrills, bruits or peripheral edema. GI & :  Soft abdomen, non-tender, non-distended, normoactive bowel sounds with no palpable organomegaly  Heme:  No cervical or axillary adenopathy. Musculoskeletal:  No cyanosis, clubbing, atrophy or deformities  Skin:  No rashes, bruising or ulcers   Neurological: Awake and alert, speech is clear, CNs 2-12 are grossly intact and otherwise non focal  Psychiatric:  Has a fair insight and is oriented x 3  ________________________________________________________________________    Data Review:    Labs:    Recent Labs     01/09/22  1432   WBC 6.3   HGB 12.9   HCT 40.5        Recent Labs     01/09/22  1432      K 5.0   *   CO2 13*   *   BUN 54*   CREA 2.88*   CA 9.5   MG 2.5*   ALB 3.8   ALT 35     No components found for: GLPOC  No results for input(s): PH, PCO2, PO2, HCO3, FIO2 in the last 72 hours. No results for input(s): INR, INREXT in the last 72 hours. Imaging Studies:      Chest X-ray and CT scan abdomen and pelvis - reviewed    I have also reviewed available old medical records.      Assessment & Impression:     Ms. Chester Aly is a 80 y.o. female being evaluated for:     Principal Problem:    Acute diverticulitis (1/9/2022)    Active Problems:    CKD (chronic kidney disease) stage 4, GFR 15-29 ml/min ([facility] [address] (1/9/2022)      HTN (hypertension), benign (1/9/2022)      GERD (gastroesophageal reflux disease) (1/9/2022)      Anxiety (1/9/2022)      MCI (mild cognitive impairment) (1/9/2022)         Plan of management:    Acute diverticulitis (1/9/2022) POA: admit to hospital. As per daughter, prior colonoscopies have been unremarkable. Start on clears, IV Levofloxacin and Metronidazole. IV fluids, IV anti-emetics. Monitor clinical progress    HTN (hypertension), benign (1/9/2022) POA: BP low normal. Hold all home BP medications. IV fluids for now and monitor    GERD (gastroesophageal reflux disease) (1/9/2022) POA: resume Pepcid     CKD (chronic kidney disease) stage 4, GFR 15-29 ml/min ([facility]) (1/9/2022) / Hyperkalemia POA: near baseline. Hydrate with IV bicarb, follow renal function    Anxiety (1/9/2022) POA: Hold Zoloft given her vomiting    MCI (mild cognitive impairment) (1/9/2022) POA: at risk for delirium. Monitor     ? COVID-19 - changes noted on CT. She has no respiratory symptoms. Fully vaccinated. Maybe old disease.  Check a rapid test. Inflammatory markers    Code Status:  DNR    Surrogate decision maker: Family    Risk of deterioration: high      Total time spent for the care of the patient: [address] [place] [address] discussed with: Patient, Family, Nursing Staff and ED physician    Discussed:  Code Status, Care Plan and D/C Planning    Prophylaxis:  Hep SQ and H2B/PPI    Probable Disposition:   PT, OT, RN           ___________________________________________________    Attending Physician: Jennifer Schrader MD

## 2022-01-09 NOTE — ED PROVIDER NOTES
Please note that this dictation was completed with amSTATZ, the computer voice recognition software.  Quite often unanticipated grammatical, syntax, homophones, and other interpretive errors are inadvertently transcribed by the computer software.  Please disregard these errors.  Please excuse any errors that have escaped final proofreading. 49-year-old female past medical history markable for chronic renal failure diagnosed 2013, diverticulitis, hyperlipidemia, hypertension, peptic ulcer disease, TIA in 2003, and recurrent UTIs presents the ER POV complaining of \" not eating normally since yesterday. \"  Patient states she is not having any pains \"just does not really feel like eating. \"  She states she is having normal bladder bowel habits, no burning with urination vaginal discharge. There is been  no vomiting, but \"patient states she has been feeling nauseated and its gradually worsening. \"  Again patient denies any overt chest pain any overt abdominal pain or tenderness. Per patient's family member \"mentally she has been declining for several months. It seems like it is getting slightly worse. We noticed is that she recently was restarted on an SSRI approximately 1 month ago and for about the past week she seems like she is not eating normally. We took her to her PCPs last Monday they checked her out said everything was okay. Now she says she feels a little worse today than she did then so we thought we better come in and have her evaluated again. \"  He denies any known trauma, other current medical concerns    pt denies HA, vison changes, diff swallowing, CP, SOB, Abd pain, F/Ch, N/V, D/Cons or other current systemic complaints    Social/ PSH reviewed in EMR    EMR Chart Reviewed           Past Medical History:   Diagnosis Date    Chronic renal failure     dx'd 2/2013    Diverticulitis     Hyperlipidemia     Hypertension     Peptic ulcer disease     Transient ischemic attack     2003    Urinary tract infection     recurrent        Past Surgical History:   Procedure Laterality Date    HX BREAST BIOPSY      Bengin    IR PTA PERIPHERAL ARTERY  10/8/2020         No family history on file. Social History     Socioeconomic History    Marital status:      Spouse name: Not on file    Number of children: Not on file    Years of education: Not on file    Highest education level: Not on file   Occupational History    Not on file   Tobacco Use    Smoking status: Never Smoker    Smokeless tobacco: Never Used   Substance and Sexual Activity    Alcohol use: No    Drug use: No    Sexual activity: Never   Other Topics Concern    Not on file   Social History Narrative    Not on file     Social Determinants of Health     Financial Resource Strain:     Difficulty of Paying Living Expenses: Not on file   Food Insecurity:     Worried About Running Out of Food in the Last Year: Not on file    [place] of Food in the Last Year: Not on file   Transportation Needs:     Lack of Transportation (Medical): Not on file    Lack of Transportation (Non-Medical):  Not on file   Physical Activity:     Days of Exercise per Week: Not on file    Minutes of Exercise per Session: Not on file   Stress:     Feeling of Stress : Not on file   Social Connections:     Frequency of Communication with Friends and Family: Not on file    Frequency of Social Gatherings with Friends and Family: Not on file    Attends Alevism Services: Not on file    Active Member of Clubs or Organizations: Not on file    Attends Club or Organization Meetings: Not on file    Marital Status: Not on file   Intimate Partner Violence:     Fear of Current or Ex-Partner: Not on file    Emotionally Abused: Not on file    Physically Abused: Not on file    Sexually Abused: Not on file   Housing Stability:     Unable to Pay for Housing in the Last Year: Not on file    Number of [place] in the Last Year: Not on file    Unstable Housing in the Last Year: Not on file         ALLERGIES: Sulfa (sulfonamide antibiotics) and Penicillins    Review of Systems   Constitutional: Positive for appetite change. Negative for chills and fever. HENT: Negative for drooling, trouble swallowing and voice change. Eyes: Negative for visual disturbance. Respiratory: Negative for cough, chest tightness and shortness of breath. Cardiovascular: Negative for chest pain and palpitations. Gastrointestinal: Positive for nausea. Negative for abdominal pain, constipation, diarrhea and vomiting. Genitourinary: Negative for dysuria. Musculoskeletal: Negative for back pain. Skin: Negative for rash. Neurological: Negative for facial asymmetry and speech difficulty. Psychiatric/Behavioral: Negative for confusion. All other systems reviewed and are negative. Vitals:    01/09/22 1321   BP: 109/67   Pulse: (!) 110   Resp: 18   Temp: 97.7 °F (36.5 °C)   SpO2: 98%   Weight: 51.3 kg (113 lb)   Height: 4' 9\" (1.448 m)            Physical Exam  Vitals and nursing note reviewed. Constitutional:       General: She is not in acute distress. Appearance: Normal appearance. She is well-developed. She is not ill-appearing, toxic-appearing or diaphoretic. Comments: NAD, AxOx4, speaking in complete sentences       HENT:      Head: Normocephalic and atraumatic. Right Ear: External ear normal.      Left Ear: External ear normal.   Eyes:      General: No scleral icterus. Right eye: No discharge. Left eye: No discharge. Extraocular Movements: Extraocular movements intact. Conjunctiva/sclera: Conjunctivae normal.      Pupils: Pupils are equal, round, and reactive to light. Neck:      Vascular: No JVD. Trachea: No tracheal deviation. Cardiovascular:      Rate and Rhythm: Normal rate and regular rhythm. Pulses: Normal pulses. Heart sounds: Normal heart sounds. No murmur heard. No friction rub. No gallop.     Pulmonary: Effort: Pulmonary effort is normal. No respiratory distress. Breath sounds: Normal breath sounds. No wheezing or rales. Chest:      Chest wall: No tenderness. Abdominal:      General: Bowel sounds are normal.      Palpations: Abdomen is soft. Tenderness: There is no abdominal tenderness. There is no guarding or rebound. Hernia: No hernia is present. Comments: nttp       Genitourinary:     Vagina: No vaginal discharge. Comments: Pt denies urinary/ vaginal complaints  Musculoskeletal:         General: No swelling, tenderness, deformity or signs of injury. Normal range of motion. Cervical back: Normal range of motion and neck supple. No rigidity or tenderness. Right lower leg: No edema. Left lower leg: No edema. Skin:     General: Skin is warm and dry. Capillary Refill: Capillary refill takes less than 2 seconds. Coloration: Skin is not pale. Findings: No bruising, erythema or rash. Neurological:      General: No focal deficit present. Mental Status: She is alert and oriented to person, place, and time. Cranial Nerves: No cranial nerve deficit. Motor: No abnormal muscle tone. Coordination: Coordination normal.   Psychiatric:         Behavior: Behavior normal.         Thought Content: Thought content normal.          MDM       Procedures      Chief Complaint   Patient presents with    Fatigue    Nausea       1:40 PM  The patients presenting problems have been discussed, and they are in agreement with the care plan formulated and outlined with them. I have encouraged them to ask questions as they arise throughout their visit.     MEDICATIONS GIVEN:  Medications   sodium chloride 0.9 % bolus infusion 1,000 mL (has no administration in time range)   ondansetron (ZOFRAN) injection 4 mg (has no administration in time range)       LABS REVIEWED:  Labs Reviewed   CBC WITH AUTOMATED DIFF   SAMPLES BEING HELD   LIPASE   METABOLIC PANEL, COMPREHENSIVE   MAGNESIUM   NT-PRO BNP   TROPONIN-HIGH SENSITIVITY   URINALYSIS W/MICROSCOPIC       RADIOLOGY RESULTS:  The following have been ordered and reviewed:  _____________________________________________________________________  _____________________________________________________________________        PROCEDURES:        CONSULTATIONS:       PROGRESS NOTES:      DIAGNOSIS:    1. Diverticulitis of large intestine without perforation or abscess without bleeding    2. Dilation of pancreatic duct    3. Dilation of biliary tract    4. COVID-19              ED COURSE: The patients hospital course has been uncomplicated. 3:15 PM  Pt/ family told of results/ agrees w/ abx/ evaluation for admission;     [address] [place] [address] for Admission  3:23 PM    ED Room Number: CW/CW  Patient Name and age:  Lorie Rodriguez 80 y.o.  female  Working Diagnosis:   1. Diverticulitis of large intestine without perforation or abscess without bleeding    2. Dilation of pancreatic duct    3. Dilation of biliary tract    4.  COVID-19        COVID-19 Suspicion:  yes  Sepsis present:  no  Reassessment needed: yes  Code Status:  Full Code  Readmission: no  Isolation Requirements:  yes  Recommended Level of Care:  med/surg  Department:[facility] ED - [phone]  Other:  Diverticulitis/ failure to tolerate po x 1 week; flagy/ Levaquin

## 2022-01-09 NOTE — ED TRIAGE NOTES
Pt reports generalized weakness and nausea onset yesterday. Pt states she has not been able to eat anything but has been drinking fluids. Pt's son reports pt has not had an appetite for about 6 days. Seen by PCP 6 days ago and had a normal work up. Denies vomiting or diarrhea.

## 2022-01-09 NOTE — PROGRESS NOTES
Admission Medication Reconciliation:     Information obtained from:   Spoke with patient's family member Sweta Maria    RxQuery data available¹:  YES    Comments/Recommendations:    Patient's family member is able to confirm name, , allergies, and preferred pharmacy   Updated PTA medication list   The family is concerned that the patient's nightly sertraline is contributing to her nausea. The patient took sertraline as needed for a while but then she recently started taking on a nightly basis. ¹RxQuery pharmacy benefit data reflects medications filled and processed through the patient's insurance, however   this data does NOT capture whether the medication was picked up or is currently being taken by the patient. Prior to Admission Medications   Prescriptions Last Dose Informant Taking? OTHER,NON-FORMULARY, 2022 at Unknown time Self Yes   Sig: Blood sugar management herbal supplement one pill daily   amLODIPine (NORVASC) 5 mg tablet 2022 at am Self Yes   Sig: Take 1 Tablet by mouth daily. aspirin 81 mg chewable tablet 2022 Self Yes   Sig: Take 81 mg by mouth daily. losartan (COZAAR) 25 mg tablet 2022 at Unknown time Self Yes   Sig: Take 25 mg by mouth daily. metoprolol succinate (Toprol XL) 50 mg XL tablet 2021 Self Yes   Sig: Take 50 mg by mouth daily. mycophenolate mofetil (CELLCEPT) 250 mg capsule 2022 at am Self Yes   Sig: Take 750 mg by mouth two (2) times a day. ondansetron (ZOFRAN ODT) 4 mg disintegrating tablet 1/3/2022 Self Yes   Sig: Take 1 Tablet by mouth every eight (8) hours as needed for Nausea or Vomiting. pantoprazole (PROTONIX) 40 mg tablet 2022 at Unknown time Self Yes   Sig: TAKE 1 TABLET EVERY DAY   red yeast rice extract 600 mg cap 2022 at Unknown time Self Yes   Sig: Take 600 mg by mouth daily. sertraline (ZOLOFT) 25 mg tablet 2022 at Unknown time Self Yes   Sig: Take 25 mg by mouth nightly. Facility-Administered Medications: None         Please contact the main inpatient pharmacy with any questions or concerns at [phone] and we will direct you to the clinical pharmacist covering this patient's care while in-house.    Raysa Martins, AntoniaD, BCPS

## 2022-01-10 PROBLEM — J12.82 PNEUMONIA DUE TO COVID-19 VIRUS: Status: ACTIVE | Noted: 2022-01-10

## 2022-01-10 PROBLEM — R00.0 SINUS TACHYCARDIA: Status: ACTIVE | Noted: 2022-01-10

## 2022-01-10 PROBLEM — N18.32 CHRONIC KIDNEY DISEASE (CKD) STAGE G3B/A3, MODERATELY DECREASED GLOMERULAR FILTRATION RATE (GFR) BETWEEN 30-44 ML/MIN/1.73 SQUARE METER AND ALBUMINURIA CREATININE RATIO GREATER THAN 300 MG/G (HCC): Status: RESOLVED | Noted: 2019-03-13 | Resolved: 2022-01-10

## 2022-01-10 PROBLEM — R11.2 NAUSEA & VOMITING: Status: ACTIVE | Noted: 2022-01-10

## 2022-01-10 PROBLEM — U07.1 PNEUMONIA DUE TO COVID-19 VIRUS: Status: ACTIVE | Noted: 2022-01-10

## 2022-01-10 PROBLEM — E66.01 SEVERE OBESITY (HCC): Status: RESOLVED | Noted: 2019-10-22 | Resolved: 2022-01-10

## 2022-01-10 PROBLEM — R53.1 WEAKNESS: Status: ACTIVE | Noted: 2022-01-10

## 2022-01-10 LAB
ALBUMIN SERPL-MCNC: 3.1 G/DL (ref 3.5–5)
ALBUMIN/GLOB SERPL: 1.1 {RATIO} (ref 1.1–2.2)
ALP SERPL-CCNC: 73 U/L (ref 45–117)
ALT SERPL-CCNC: 28 U/L (ref 12–78)
ANION GAP SERPL CALC-SCNC: 10 MMOL/L (ref 5–15)
AST SERPL-CCNC: 16 U/L (ref 15–37)
BASOPHILS # BLD: 0 K/UL (ref 0–0.1)
BASOPHILS NFR BLD: 0 % (ref 0–1)
BILIRUB SERPL-MCNC: 0.3 MG/DL (ref 0.2–1)
BUN SERPL-MCNC: 47 MG/DL (ref 6–20)
BUN/CREAT SERPL: 19 (ref 12–20)
CALCIUM SERPL-MCNC: 8.3 MG/DL (ref 8.5–10.1)
CHLORIDE SERPL-SCNC: 115 MMOL/L (ref 97–108)
CO2 SERPL-SCNC: 18 MMOL/L (ref 21–32)
COMMENT, HOLDF: NORMAL
CREAT SERPL-MCNC: 2.42 MG/DL (ref 0.55–1.02)
CRP SERPL-MCNC: 1.13 MG/DL (ref 0–0.6)
DIFFERENTIAL METHOD BLD: ABNORMAL
EOSINOPHIL # BLD: 0 K/UL (ref 0–0.4)
EOSINOPHIL NFR BLD: 0 % (ref 0–7)
ERYTHROCYTE [DISTWIDTH] IN BLOOD BY AUTOMATED COUNT: 13.6 % (ref 11.5–14.5)
GLOBULIN SER CALC-MCNC: 2.8 G/DL (ref 2–4)
GLUCOSE SERPL-MCNC: 106 MG/DL (ref 65–100)
HCT VFR BLD AUTO: 33.5 % (ref 35–47)
HGB BLD-MCNC: 10.7 G/DL (ref 11.5–16)
IMM GRANULOCYTES # BLD AUTO: 0 K/UL (ref 0–0.04)
IMM GRANULOCYTES NFR BLD AUTO: 1 % (ref 0–0.5)
LYMPHOCYTES # BLD: 1 K/UL (ref 0.8–3.5)
LYMPHOCYTES NFR BLD: 25 % (ref 12–49)
MCH RBC QN AUTO: 28.2 PG (ref 26–34)
MCHC RBC AUTO-ENTMCNC: 31.9 G/DL (ref 30–36.5)
MCV RBC AUTO: 88.2 FL (ref 80–99)
MONOCYTES # BLD: 0.3 K/UL (ref 0–1)
MONOCYTES NFR BLD: 6 % (ref 5–13)
NEUTS SEG # BLD: 2.8 K/UL (ref 1.8–8)
NEUTS SEG NFR BLD: 68 % (ref 32–75)
NRBC # BLD: 0 K/UL (ref 0–0.01)
NRBC BLD-RTO: 0 PER 100 WBC
PLATELET # BLD AUTO: 154 K/UL (ref 150–400)
PMV BLD AUTO: 10.4 FL (ref 8.9–12.9)
POTASSIUM SERPL-SCNC: 3.4 MMOL/L (ref 3.5–5.1)
PROCALCITONIN SERPL-MCNC: <0.05 NG/ML
PROT SERPL-MCNC: 5.9 G/DL (ref 6.4–8.2)
RBC # BLD AUTO: 3.8 M/UL (ref 3.8–5.2)
SAMPLES BEING HELD,HOLD: NORMAL
SODIUM SERPL-SCNC: 143 MMOL/L (ref 136–145)
WBC # BLD AUTO: 4.2 K/UL (ref 3.6–11)

## 2022-01-10 PROCEDURE — 74011250637 HC RX REV CODE- 250/637: Performed by: INTERNAL MEDICINE

## 2022-01-10 PROCEDURE — 2709999900 HC NON-CHARGEABLE SUPPLY

## 2022-01-10 PROCEDURE — 80053 COMPREHEN METABOLIC PANEL: CPT

## 2022-01-10 PROCEDURE — G0378 HOSPITAL OBSERVATION PER HR: HCPCS

## 2022-01-10 PROCEDURE — 97161 PT EVAL LOW COMPLEX 20 MIN: CPT

## 2022-01-10 PROCEDURE — 86140 C-REACTIVE PROTEIN: CPT

## 2022-01-10 PROCEDURE — 97535 SELF CARE MNGMENT TRAINING: CPT

## 2022-01-10 PROCEDURE — 74011250636 HC RX REV CODE- 250/636: Performed by: INTERNAL MEDICINE

## 2022-01-10 PROCEDURE — 84145 PROCALCITONIN (PCT): CPT

## 2022-01-10 PROCEDURE — 96372 THER/PROPH/DIAG INJ SC/IM: CPT

## 2022-01-10 PROCEDURE — 97530 THERAPEUTIC ACTIVITIES: CPT

## 2022-01-10 PROCEDURE — 85025 COMPLETE CBC W/AUTO DIFF WBC: CPT

## 2022-01-10 PROCEDURE — 36415 COLL VENOUS BLD VENIPUNCTURE: CPT

## 2022-01-10 PROCEDURE — 96375 TX/PRO/DX INJ NEW DRUG ADDON: CPT

## 2022-01-10 PROCEDURE — 65270000029 HC RM PRIVATE

## 2022-01-10 PROCEDURE — 97116 GAIT TRAINING THERAPY: CPT

## 2022-01-10 PROCEDURE — 74011000250 HC RX REV CODE- 250: Performed by: INTERNAL MEDICINE

## 2022-01-10 PROCEDURE — 97165 OT EVAL LOW COMPLEX 30 MIN: CPT

## 2022-01-10 RX ORDER — METRONIDAZOLE 250 MG/1
500 TABLET ORAL EVERY 8 HOURS
Status: DISCONTINUED | OUTPATIENT
Start: 2022-01-10 | End: 2022-01-11

## 2022-01-10 RX ORDER — METOPROLOL SUCCINATE 50 MG/1
50 TABLET, EXTENDED RELEASE ORAL DAILY
Status: DISCONTINUED | OUTPATIENT
Start: 2022-01-10 | End: 2022-01-11 | Stop reason: HOSPADM

## 2022-01-10 RX ORDER — MYCOPHENOLATE MOFETIL 250 MG/1
750 CAPSULE ORAL 2 TIMES DAILY
Status: DISCONTINUED | OUTPATIENT
Start: 2022-01-10 | End: 2022-01-11 | Stop reason: HOSPADM

## 2022-01-10 RX ORDER — DEXTROSE, SODIUM CHLORIDE, AND POTASSIUM CHLORIDE 5; .45; .15 G/100ML; G/100ML; G/100ML
100 INJECTION INTRAVENOUS CONTINUOUS
Status: DISCONTINUED | OUTPATIENT
Start: 2022-01-10 | End: 2022-01-11

## 2022-01-10 RX ORDER — FAMOTIDINE 20 MG/1
20 TABLET, FILM COATED ORAL 2 TIMES DAILY
Status: DISCONTINUED | OUTPATIENT
Start: 2022-01-10 | End: 2022-01-11 | Stop reason: HOSPADM

## 2022-01-10 RX ORDER — SERTRALINE HYDROCHLORIDE 25 MG/1
25 TABLET, FILM COATED ORAL
Status: DISCONTINUED | OUTPATIENT
Start: 2022-01-10 | End: 2022-01-11 | Stop reason: HOSPADM

## 2022-01-10 RX ADMIN — MYCOPHENOLATE MOFETIL 750 MG: 250 CAPSULE ORAL at 11:03

## 2022-01-10 RX ADMIN — POTASSIUM CHLORIDE, DEXTROSE MONOHYDRATE AND SODIUM CHLORIDE 100 ML/HR: 150; 5; 450 INJECTION, SOLUTION INTRAVENOUS at 11:03

## 2022-01-10 RX ADMIN — METRONIDAZOLE 500 MG: 250 TABLET ORAL at 22:36

## 2022-01-10 RX ADMIN — FAMOTIDINE 20 MG: 20 TABLET ORAL at 18:31

## 2022-01-10 RX ADMIN — HEPARIN SODIUM 5000 UNITS: 5000 INJECTION INTRAVENOUS; SUBCUTANEOUS at 11:04

## 2022-01-10 RX ADMIN — HEPARIN SODIUM 5000 UNITS: 5000 INJECTION INTRAVENOUS; SUBCUTANEOUS at 18:31

## 2022-01-10 RX ADMIN — HEPARIN SODIUM 5000 UNITS: 5000 INJECTION INTRAVENOUS; SUBCUTANEOUS at 01:53

## 2022-01-10 RX ADMIN — Medication 10 ML: at 15:05

## 2022-01-10 RX ADMIN — MYCOPHENOLATE MOFETIL 750 MG: 250 CAPSULE ORAL at 18:30

## 2022-01-10 RX ADMIN — METRONIDAZOLE 500 MG: 250 TABLET ORAL at 15:04

## 2022-01-10 RX ADMIN — METRONIDAZOLE 500 MG: 500 INJECTION, SOLUTION INTRAVENOUS at 03:12

## 2022-01-10 RX ADMIN — METOPROLOL SUCCINATE 50 MG: 50 TABLET, EXTENDED RELEASE ORAL at 11:03

## 2022-01-10 RX ADMIN — FAMOTIDINE 20 MG: 20 TABLET ORAL at 11:03

## 2022-01-10 RX ADMIN — SERTRALINE 25 MG: 25 TABLET, FILM COATED ORAL at 22:36

## 2022-01-10 NOTE — CONSULTS
Comprehensive Nutrition Assessment      Type and Reason for Visit: Initial,Consult    Nutrition Recommendations/Plan:   1. Continue Regular diet to allow more choices. 2. Do not add Ensure - pt claims this causes diarrhea. 3. Daily weights. 4. Document PO intake      Nutrition Assessment:       Pt admitted for Acute diverticulitis [K57.92]. Pt  has a past medical history of Chronic renal failure, Diverticulitis, Hyperlipidemia, Hypertension, Peptic ulcer disease, Transient ischemic attack, and Urinary tract infection. .    Consult received for wt loss. Family reports ~20 lbs wt loss since October. Pt was weighed in Aug 2021 as 118 lbs. Currently 113 lbs. Down 5 lbs over 5 months. Pt has lost more weight over longer period of time. Pt was ~130 lbs in July 2020. Wt loss is concerning but not significant. PO reportedly low x 1-2 weeks. Appetite seems to be improving. Pt ate >75% of breakfast and lunch. Encourage continued PO intake now that GI complaints have resolved. Encourage kcal dense foods if PO is low like PB/crackers, bananas, etc. NKFA. Denies n/v, c/d chew/swallow difficulties. Wt Readings from Last 10 Encounters:   01/09/22 51.3 kg (113 lb)   01/05/22 51.3 kg (113 lb)   08/04/21 53.9 kg (118 lb 12.8 oz)   01/19/21 56.8 kg (125 lb 3.2 oz)   12/29/20 58.1 kg (128 lb)   07/21/20 59.1 kg (130 lb 6.4 oz)   03/27/20 60.8 kg (134 lb)   03/05/20 61 kg (134 lb 6.4 oz)   11/12/19 64 kg (141 lb)   10/22/19 66.7 kg (147 lb)       Malnutrition Assessment:  Malnutrition Status:   At risk for malnutrition (specify)    Context:  Chronic illness     Findings of the 6 clinical characteristics of malnutrition:   Energy Intake:  Mild decrease in energy intake (specify)  Weight Loss:  Mild weight loss (specify amount and time period) (4% over 5 months)     Body Fat Loss:  Unable to assess,     Muscle Mass Loss:  Unable to assess,    Fluid Accumulation:  No significant fluid accumulation,     Strength:  Not performed       Estimated Daily Nutrient Needs:  Energy (kcal): 1312 (MSJx1.3+250); Weight Used for Energy Requirements: Current  Protein (g): 51; Weight Used for Protein Requirements: Current  Fluid (ml/day): 1500 mL for elderly; Method Used for Fluid Requirements: 1 ml/kcal    Documented meal intake:   Patient Vitals for the past 168 hrs:   % Diet Eaten   01/10/22 1214 76 - 100%   01/10/22 0900 76 - 100%       Documented Supplement intake:  No data found. Nutrition Related Findings:     Last BM PTA  Edema:       Nutritionally Significant Medications:   Pepcid, Flagyl. D5 1/2NS @ 100 mL/hr      Wounds:    None       Current Nutrition Therapies:  ADULT DIET Regular    Anthropometric Measures:  · Height:  4' 9.01\" (144.8 cm)  · Current Body Wt:  51.3 kg (113 lb 1.5 oz)   · Admission Body Wt:   113 lbs    · Usual Body Wt:   130 lbs     · Ideal Body Wt:  85 lbs:  133.1 %   · BMI Category:  Normal weight (BMI 18.5-24. 9)       Nutrition Diagnosis:   · Inadequate energy intake related to altered GI function as evidenced by weight loss,poor intake prior to admission      Nutrition Interventions:   Food and/or Nutrient Delivery: Continue current diet  Nutrition Education and Counseling: No recommendations at this time  Coordination of Nutrition Care: Continue to monitor while inpatient,Interdisciplinary rounds    Goals:  PO >25% of meals and wt mainteance within 3-5 days       Nutrition Monitoring and Evaluation:   Behavioral-Environmental Outcomes: None identified  Food/Nutrient Intake Outcomes: Food and nutrient intake  Physical Signs/Symptoms Outcomes: Biochemical data,Weight    Discharge Planning:    Continue oral nutrition supplement,Enteral nutrition     Electronically signed by Merly Eason, [address]     Contact: [phone]

## 2022-01-10 NOTE — PROGRESS NOTES
[facility]    Medical Progress Note      NAME: Adrianne Stephen   :  1933  MRM:  [id number]    Date/Time of service 1/10/2022  11:43 AM          Assessment and Plan:     Acute diverticulitis - Noted on CT, but she has no abd pain, good appetite and no sepsis criteria. Incidental finding? For now IV levaquin and flagyl. Follow cx. Short course Abx at home. Acute on CKD (chronic kidney disease) stage 4 - POA, worse due to IVVD. Better after hydration. CO2 better, so convert bicarb to half NS. Resume cellcept. Pneumonia due to COVID-19 virus - POA, mild case. Low inflammatory markers, no hypoxia. For now no treatment. Hypotension / Sinus tachycardia / hx HTN (hypertension) - BP low and pulse high due to dehydration. Hydrate. Continue metoprolol to avoid rebound tachycardia, but hold losartan and Norvasc. Anxiety / MCI (mild cognitive impairment) - Supporitve care. Continue sertraline    Nausea & vomiting / GERD (gastroesophageal reflux disease) - POA, triggered by COVID, less likely diverticulitis. Full liquid diet. ADAT. Pepcid. Weakness - POA due to COVID and age. Fall precautions. PT OT eval       Subjective:     Chief Complaint:  Nausea and weakness much better, wants to go home    ROS:  (bold if positive, if negative)    Tolerating PT  Tolerating Diet        Objective:     Last 24hrs VS reviewed since prior progress note.  Most recent are:    Visit Vitals  /77 (BP 1 Location: Left upper arm, BP Patient Position: Lying)   Pulse (!) 105   Temp 98.4 °F (36.9 °C)   Resp 17   Ht 4' 9\" (1.448 m)   Wt 51.3 kg (113 lb)   SpO2 97%   BMI 24.45 kg/m²     SpO2 Readings from Last 6 Encounters:   01/10/22 97%   22 100%   21 98%   21 99%   20 99%   20 99%            Intake/Output Summary (Last 24 hours) at 1/10/2022 0943  Last data filed at 1/10/2022 7067  Gross per 24 hour   Intake 618.47 ml   Output --   Net 618.47 ml        Physical Exam:    Gen:  Well-developed, well-nourished, in no acute distress  HEENT:  Pink conjunctivae, PERRL, hearing intact to voice, moist mucous membranes  Neck:  Supple, without masses, thyroid non-tender  Resp:  No accessory muscle use, clear breath sounds without wheezes rales or rhonchi  Card:  No murmurs, tachycardic S1, S2 without thrills, bruits or peripheral edema  Abd:  Soft, non-tender, non-distended, normoactive bowel sounds are present, no mass  Lymph:  No cervical or inguinal adenopathy  Musc:  No cyanosis or clubbing  Skin:  No rashes or ulcers, skin turgor is good  Neuro:  Cranial nerves are grossly intact, general motor weakness, follows commands appropriately  Psych:  Good insight, oriented to person, place and time, alert    Telemetry reviewed:   normal sinus rhythm  __________________________________________________________________  Medications Reviewed: (see below)  Medications:     Current Facility-Administered Medications   Medication Dose Route Frequency    dextrose 5% - 0.45% NaCl with KCl 20 mEq/L infusion  100 mL/hr IntraVENous CONTINUOUS    famotidine (PEPCID) tablet 20 mg  20 mg Oral BID    metoprolol succinate (TOPROL-XL) XL tablet 50 mg  50 mg Oral DAILY    sertraline (ZOLOFT) tablet 25 mg  25 mg Oral QHS    mycophenolate mofetil (CELLCEPT) capsule 750 mg  750 mg Oral BID    metroNIDAZOLE (FLAGYL) IVPB premix 500 mg  500 mg IntraVENous Q12H    sodium chloride (NS) flush 5-40 mL  5-40 mL IntraVENous Q8H    sodium chloride (NS) flush 5-40 mL  5-40 mL IntraVENous PRN    acetaminophen (TYLENOL) tablet 650 mg  650 mg Oral Q6H PRN    polyethylene glycol (MIRALAX) packet 17 g  17 g Oral DAILY PRN    ondansetron (ZOFRAN) injection 4 mg  4 mg IntraVENous Q6H PRN    guaiFENesin-dextromethorphan (ROBITUSSIN DM) 100-10 mg/5 mL syrup 5 mL  5 mL Oral Q4H PRN    [START ON 1/11/2022] levoFLOXacin (LEVAQUIN) 500 mg in D5W IVPB  500 mg IntraVENous Q48H    heparin (porcine) injection 5,000 Units 5,000 Units SubCUTAneous Q8H    ipratropium-albuterol (COMBIVENT RESPIMAT) 20 mcg-100 mcg inhalation spray  1 Puff Inhalation Q4H PRN        Lab Data Reviewed: (see below)  Lab Review:     Recent Labs     01/10/22  0356 01/09/22  1432   WBC 4.2 6.3   HGB 10.7* 12.9   HCT 33.5* 40.5    196     Recent Labs     01/10/22  0356 01/09/22  1432    139   K 3.4* 5.0   * 112*   CO2 18* 13*   * 135*   BUN 47* 54*   CREA 2.42* 2.88*   CA 8.3* 9.5   MG  --  2.5*   ALB 3.1* 3.8   TBILI 0.3 0.4   ALT 28 35     No results found for: GLUCPOC  No results for input(s): PH, PCO2, PO2, HCO3, FIO2 in the last 72 hours. No results for input(s): INR, INREXT in the last 72 hours. All Micro Results     Procedure Component Value Units Date/Time    COVID-19 RAPID TEST [[id number]]  (Abnormal) Collected: 01/09/22 2037    Order Status: Completed Specimen: Nasopharyngeal Updated: 01/09/22 2112     Specimen source Nasopharyngeal        COVID-19 rapid test Detected        Comment: Rapid Abbott ID Now       The specimen is POSITIVE for SARS-CoV-2, the novel coronavirus associated with COVID-19. This test has been authorized by the FDA under an Emergency Use Authorization (EUA) for use by authorized laboratories. Fact sheet for Healthcare Providers: ConventionUpdate.co.nz  Fact sheet for Patients: ConventionUpdate.co.nz       Methodology: Isothermal Nucleic Acid Amplification  CALLED TO AND READ BACK BY  MEDHAT TATUM AT 2112. MG         URINE CULTURE HOLD SAMPLE [[id number]] Collected: 01/09/22 1437    Order Status: Completed Specimen: Serum Updated: 01/09/22 1450     Urine culture hold       Urine on hold in Microbiology dept for 2 days. If unpreserved urine is submitted, it cannot be used for addtional testing after 24 hours, recollection will be required.                 Other pertinent lab: none    Total time spent with patient: 30 Minutes I personally reviewed chart, notes, data and current medications in the medical record. I have personally examined and treated the patient at bedside during this period.                  Care Plan discussed with: Patient, Care Manager, Nursing Staff and >50% of time spent in counseling and coordination of care    Discussed:  Care Plan and D/C Planning    Prophylaxis:  Lovenox and H2B/PPI    Disposition:  Home w/Family           ___________________________________________________    Attending Physician: Keara Ortiz MD

## 2022-01-10 NOTE — PROGRESS NOTES
Patient's daughter Cintia Basurto) and grandson Yessica Keenan) updated on patient status and POC. Patient's family reports poor oral intake since for past 1-2 weeks, with around 20 pound weight loss since October 2021. Nutrition consult placed.

## 2022-01-10 NOTE — ED NOTES
1500 labs added on to labs previously drawn. Spoke with Jenelle Gautam on 4th floor for report on patient.

## 2022-01-10 NOTE — ACP (ADVANCE CARE PLANNING)
1/10/2022  12:31 PM  Advance Care Planning     Advance Care Planning Activator (Inpatient)  Conversation Note      Date of ACP Conversation: 01/10/22     Conversation Conducted with:   Patient with Decision Making Capacity    ACP Activator: Rohini Maker:    Current Designated Health Care Decision Maker:     Primary Decision MakerEarnesteen Flies - Daughter - [phone]  Click here to complete [address] including selection of the Healthcare Decision Maker Relationship (ie \"Primary\")  Today we documented Decision Maker(s). The patient will provide ACP documents. Care Preferences    Ventilation: \"If you were in your present state of health and suddenly became very ill and were unable to breathe on your own, what would your preference be about the use of a ventilator (breathing machine) if it were available to you? \"      If patient would desire the use of a ventilator (breathing machine), answer \"yes\", if not \"no\":no    \"If your health worsens and it becomes clear that your chance of recovery is unlikely, what would your preference be about the use of a ventilator (breathing machine) if it were available to you? \"     Would the patient desire the use of a ventilator (breathing machine)? NO      Resuscitation  \"CPR works best to restart the heart when there is a sudden event, like a heart attack, in someone who is otherwise healthy. Unfortunately, CPR does not typically restart the heart for people who have serious health conditions or who are very sick. \"    \"In the event your heart stopped as a result of an underlying serious health condition, would you want attempts to be made to restart your heart (answer \"yes\" for attempt to resuscitate) or would you prefer a natural death (answer \"no\" for do not attempt to resuscitate)? \" no      [] Yes  [] No   Educated Patient / Elfredia Latin regarding differences between Advance Directives and portable DNR orders.     Length of ACP Conversation in minutes:  5    Conversation Outcomes:  [] ACP discussion completed  [] Existing advance directive reviewed with patient; no changes to patient's previously recorded wishes     [] New Advance Directive completed   [] Portable Do Not Resuscitate prepared for Provider review and signature  [] POLST/POST/MOLST/MOST prepared for Provider review and signature

## 2022-01-10 NOTE — ROUTINE PROCESS
TRANSFER - OUT REPORT:    Verbal report given to [facility] RN(name) on Lela Juarez  being transferred to [address] [place] [address] (unit) for routine progression of care       Report consisted of patients Situation, Background, Assessment and   Recommendations(SBAR). Information from the following report(s) SBAR, ED Summary, [facility] and Recent Results was reviewed with the receiving nurse. Lines:   Peripheral IV 01/09/22 Right Antecubital (Active)        Opportunity for questions and clarification was provided.       Patient transported with:   [employer]

## 2022-01-10 NOTE — PROGRESS NOTES
Problem: Airway Clearance - Ineffective  Goal: Achieve or maintain patent airway  Outcome: Progressing Towards Goal     Problem: Gas Exchange - Impaired  Goal: Absence of hypoxia  Outcome: Progressing Towards Goal  Goal: Promote optimal lung function  Outcome: Progressing Towards Goal     Problem: Breathing Pattern - Ineffective  Goal: Ability to achieve and maintain a regular respiratory rate  Outcome: Progressing Towards Goal     Problem: Body Temperature -  Risk of, Imbalanced  Goal: Ability to maintain a body temperature within defined limits  Outcome: Progressing Towards Goal  Goal: Will regain or maintain usual level of consciousness  Outcome: Progressing Towards Goal  Goal: Complications related to the disease process, condition or treatment will be avoided or minimized  Outcome: Progressing Towards Goal     Problem: Isolation Precautions - Risk of Spread of Infection  Goal: Prevent transmission of infectious organism to others  Outcome: Progressing Towards Goal     Problem: Nutrition Deficits  Goal: Optimize nutrtional status  Outcome: Progressing Towards Goal     Problem: Risk for Fluid Volume Deficit  Goal: Maintain normal heart rhythm  Outcome: Progressing Towards Goal  Goal: Maintain absence of muscle cramping  Outcome: Progressing Towards Goal  Goal: Maintain normal serum potassium, sodium, calcium, phosphorus, and pH  Outcome: Progressing Towards Goal     Problem: Loneliness or Risk for Loneliness  Goal: Demonstrate positive use of time alone when socialization is not possible  Outcome: Progressing Towards Goal     Problem: Fatigue  Goal: Verbalize increase energy and improved vitality  Outcome: Progressing Towards Goal     Problem: Patient Education: Go to Patient Education Activity  Goal: Patient/Family Education  Outcome: Progressing Towards Goal     Problem: Falls - Risk of  Goal: *Absence of Falls  Description: Document Beth Fall Risk and appropriate interventions in the flowsheet.   Outcome: Progressing Towards Goal  Note: Fall Risk Interventions:  Mobility Interventions: Bed/chair exit alarm,Patient to call before getting OOB              Elimination Interventions: Bed/chair exit alarm,Call light in reach,Patient to call for help with toileting needs              Problem: Patient Education: Go to Patient Education Activity  Goal: Patient/Family Education  Outcome: Progressing Towards Goal

## 2022-01-10 NOTE — PROGRESS NOTES
OCCUPATIONAL THERAPY EVALUATION/DISCHARGE  Patient: Morro Hoskins (38 y.o. female)  Date: 1/10/2022  Primary Diagnosis: Acute diverticulitis [K57.92]       Precautions:  Contact,DNR (Droplet +;COVID)    ASSESSMENT  Based on the objective data described below, the patient presents with good overall activity tolerance following admission for diverticulitis with c/o weakness and nausea. Patient has tested positive for COVID-19 and seen today on droplet plus precautions. Patient performed transfers and ADLs without LOB or physical assistance needed. Patient tolerated activity on room air with SpO2 remaining  >90%. NO further skilled OT needs are indicated at this time. Current Level of Function (ADLs/self-care): Patient is independent to supervision for functional mobility. Patient is independent and mod I for ADLs. Functional Outcome Measure: The patient scored 90/100 on the Barthel Index outcome measure. PLAN :    Recommendation for discharge: (in order for the patient to meet his/her long term goals)  No skilled occupational therapy/ follow up rehabilitation needs identified at this time. This discharge recommendation:  Has been made in collaboration with the attending provider and/or case management    IF patient discharges home will need the following DME: none       SUBJECTIVE:   Patient agreeable to OT evlauation. OBJECTIVE DATA SUMMARY:   HISTORY:   Past Medical History:   Diagnosis Date    Chronic renal failure     dx'd 2/2013    Diverticulitis     Hyperlipidemia     Hypertension     Peptic ulcer disease     Transient ischemic attack     2003    Urinary tract infection     recurrent      Past Surgical History:   Procedure Laterality Date    HX BREAST BIOPSY      [place]    IR PTA PERIPHERAL ARTERY  10/8/2020       Prior Level of Function/Environment/Context: Patient lives alone.     Expanded or extensive additional review of patient history:   [facility] Environment: Private residence  # Steps to Enter: 0  One/Two Story Residence: One story  Living Alone: Yes (daughter and grandson live nearby)  Support Systems: Child(heath)  Patient Expects to be Discharged to[de-identified] [employer]  Current DME Used/Available at Home: None  Tub or Shower Type: Tub/Shower combination    Hand dominance: Right    EXAMINATION OF PERFORMANCE DEFICITS:  Cognitive/Behavioral Status:  Neurologic State: Alert  Orientation Level: Oriented X4  Cognition: Poor safety awareness  Perception: Appears intact  Perseveration: No perseveration noted  Safety/Judgement: Awareness of environment    Skin: Intact in the uppers    Edema: None noted in the uppers    Hearing: Auditory  Auditory Impairment: Hard of hearing, bilateral (no hearing aids)    Vision/Perceptual:    Tracking: Able to track stimulus in all quadrants w/o difficulty    Diplopia: No    Acuity: Within Defined Limits       Range of Motion:  AROM: Within functional limits    Strength:  WDL in the uppers    Coordination:  Fine Motor Skills-Upper: Left Intact; Right Intact    Gross Motor Skills-Upper: Left Intact; Right Intact    Tone & Sensation:  Tone: Normal  Sensation: Intact    Balance:  Sitting: Intact  Standing: Intact; With support  Standing - Static: Good  Standing - Dynamic : Fair    Functional Mobility and Transfers for ADLs:  Bed Mobility:  Rolling: Independent  Supine to Sit: Independent  Sit to Supine: Independent  Scooting: Independent    Transfers:  Sit to Stand: Supervision  Stand to Sit: Supervision  Bed to Chair: Supervision  Bathroom Mobility: Supervision/set up  Toilet Transfer : Supervision    ADL Assessment:  Feeding: Independent    Oral Facial Hygiene/Grooming: Independent    Bathing: Modified independent    Upper Body Dressing: Independent    Lower Body Dressing: Modified independent    Toileting: Independent    Cognitive Retraining  Safety/Judgement: Awareness of environment    Functional Measure:    Barthel Index:  Bathing: 5  Bladder: 10  Bowels: 10  Groomin  Dressing: 10  Feeding: 10  Mobility: 15  Stairs: 5  Toilet Use: 10  Transfer (Bed to Chair and Back): 10  Total: 90/100      The Barthel ADL Index: Guidelines  1. The index should be used as a record of what a patient does, not as a record of what a patient could do. 2. The main aim is to establish degree of independence from any help, physical or verbal, however minor and for whatever reason. 3. The need for supervision renders the patient not independent. 4. A patient's performance should be established using the best available evidence. Asking the patient, friends/relatives and nurses are the usual sources, but direct observation and common sense are also important. However direct testing is not needed. 5. Usually the patient's performance over the preceding 24-48 hours is important, but occasionally longer periods will be relevant. 6. Middle categories imply that the patient supplies over 50 per cent of the effort. 7. Use of aids to be independent is allowed. Score Interpretation (from [address] [facility] 83)    Independent   60-79 Minimally independent   40-59 Partially dependent   20-39 Very dependent   <20 Totally dependent     -Charlie Lubin., Barthel, DRozW. (1965). Functional evaluation: the Barthel Index. [facility] ([address] [place] (). The Barthel activities of daily living index: self-reporting versus actual performance in the old (> or = 75 years). Journal of [address] [place] [address] 45(7), 14 [facility], RADHA, Jayjay Zapata., Wilber Guzman. (). Measuring the change in disability after inpatient rehabilitation; comparison of the responsiveness of the Barthel Index and Functional [place] Measure. Journal of Neurology, Neurosurgery, and Psychiatry, 66(4), 161-846.   Robbi Rutherford, N.J.A, WILL Taylor, & Elvis Mcmanus MRozA. (2004) Assessment of post-stroke quality of life in cost-effectiveness studies: The usefulness of the Barthel Index and the EuroQoL-5D. Quality of Life Research, 15, 405-79       Occupational Therapy Evaluation Charge Determination   History Examination Decision-Making   LOW Complexity : Brief history review  LOW Complexity : 1-3 performance deficits relating to physical, cognitive , or psychosocial skils that result in activity limitations and / or participation restrictions  LOW Complexity : No comorbidities that affect functional and no verbal or physical assistance needed to complete eval tasks       Based on the above components, the patient evaluation is determined to be of the following complexity level: LOW     Activity Tolerance:   Good    After treatment patient left in no apparent distress:    Sitting in chair, Call bell within reach and Bed / chair alarm activated    COMMUNICATION/EDUCATION:   The patients plan of care was discussed with: Physical therapist, Registered nurse and patient. .     Thank you for this referral.  JOSE MARIA Lay/JUNE  Time Calculation: 38 mins

## 2022-01-10 NOTE — PROGRESS NOTES
1/10/2022  12:22 PM  Care Management Assessment      Reason for Admission: Emergency - COVID positive, vaccinated pt on room air. PT indicated no rehab needs. ICD-10-CM ICD-9-CM    1. Diverticulitis of large intestine without perforation or abscess without bleeding  K57.32 562.11    2. Dilation of pancreatic duct  K86.89 577.8    3. Dilation of biliary tract  K83.8 576.8    4. COVID-19  U07.1 079.89        Assessment:   []In person with pt   [x]Via p/c with pt   []With family member in person. Who/Relation:     []With family member via p/c. Who/Relation:   []Chart Review    RUR: 10%  Risk Level: [x]Low []Moderate []High  Value-based purchasing: [x] Yes [] No  Bundle patient: [] Yes [x] No   Specify:     Advance Directive: DNR. ACP completed. [] No AD on file. [] AD on file. [x] Current AD not on file. Copy requested. [] Requests AD, and referral submitted to [facility]. Healthcare Decision Maker:   Primary Decision Maker: Sylvester Florian [facility] - [phone]        Assessment:    Age:  80    Sex: [] Male [x]Female     Residency: [x]Private residence []Apartment []Assisted Living []LTC []Other:   Exterior Steps: 3  Interior Steps: 0    Lives With: []With spouse []Other family members []Underage children [x]Alone []Care provider [x]Other: Pt's DTR and grandchildren live nearby.      Prior functioning:  [x]Independent with ADLs and iADLS []Dependent with ADLs and iADLs []Partial dependence, Specify:     Prior DME required:  [x]None []RW []Cane []Crutches []Bedside commode []CPAP []Home O2 (Liter/Provider: ) []Nebulizer   []Shower Chair []Wheelchair []Hospital Bed [][place]nn []Stair lift []Rollator []Other:    DME available: [x]None []RW []Cane []Crutches []Bedside commode []CPAP []Home O2 (Liter/Provider: ) []Nebulizer   []Shower Chair []Wheelchair []Hospital Bed []Gylnn []Stair lift []Rollator []Other:    Rehab history: [x]None []Outpatient PT []Home Health (Provider/Date: ) []SNF (Provider/Date: ) []IPR (Provider/Date: ) []LTC (Provider/Date: ) []Hospice (Provider/Date: )  []Other:     Discharge Concerns: []Yes [x]No []Unknown   Describe:    Comments: Insurer:   Windy Cook Phone: --    Subscriber: Kana Galo Subscriber#: [id number]    Group#: [id number] Precert#: --          PCP: Lucila Maguire   Address: [address] / [address] [facility]   Phone number: [phone]   Current patient: [x]Yes []No   Approximate date of last visit: 01/05/2022   Access to virtual PCP visits: []Yes [x]No    Pharmacy:  [facility]. [place] Transport: Pt's DTR and grandson       Transition of care plan:    []Unable to determine at this time. Awaiting clinical progress, and disposition recommendations. [x] Home with outpatient follow-up. [] Home with Outpatient PT and outpatient follow-up   Pt aware of OP appt? []Yes, Provider:   []Not scheduled   Transport provider:     [] Home with family assistance as needed and outpatient follow-up   Family able to assist:    Schedule:  Transport provider:      [] Home with Home Health   - Provider:     []SNF/IPR   -[]Preferences given:   []Listing provided and preferences requested   -Status: []Pending []Accepted:    -Auth required: []Yes []No    -Auth initiated date:   -3 midnight stay required: []Yes []No  Date satisfied:     [] Home with Hospice   -Provider:     [] [facility] information provided. [] Other:      Isatu Valentine MA    Care Management Interventions  PCP Verified by CM: Yes (Areli)  Mode of Transport at Discharge:  Other (see comment)  MyChart Signup: No  Discharge Durable Medical Equipment: No  Physical Therapy Consult: Yes  Occupational Therapy Consult: No  Speech Therapy Consult: No  Support Systems: Child(heath)  Confirm Follow Up Transport: Family  Discharge Location  Discharge Placement: Home with family assistance

## 2022-01-10 NOTE — PROGRESS NOTES
Problem: Mobility Impaired (Adult and Pediatric)  Goal: *Acute Goals and Plan of Care (Insert Text)  Outcome: Progressing Towards Goal   PHYSICAL THERAPY EVALUATION/DISCHARGE  Patient: Thao Richardson (66 y.o. female)  Date: 1/10/2022  Primary Diagnosis: Acute diverticulitis [K57.92]        Precautions:   Contact,DNR (Droplet +;COVID)      ASSESSMENT  Based on the objective data described below, the patient presents with admission due to Acute Diverticulitis with N&V, tachycardia compounded with testing COVID-19 positive with secondary PNA. Pt children and grandchildren live with her. She was independent PTA. She is received supine in bed A&Ox4. Pt is Mod I to EOB. O2 sats on room air at rest are 93-96% with HR of 108. Sit to stand with SBA. Gait of 150' tolerated well with good O2 sats of 92% with improved readings to 96% upon resting. Slight path deviations, but safe without asst device and SBA. . Tachycardia noted up to 126bpm with activity. RN informed. Pt placed in recliner in NAD. Pt is at baseline at this point. She states resolution of abdominal discomfort and symptoms at this time. Further skilled acute physical therapy is not indicated at this time. Documentation for home O2:     ROOM AIR    AT REST   O2 SATS  92-96 HR  108   ROOM AIR WITH ACTIVITY 02 SATS  92 HR  126   (0    ) LITERS OF O2 WITH ACTIVITY O2 SATS  92 HR  126   (0    )LITERS OF 02 PATIENT LEFT COMFORTABLY  SITTING/SUPINE 02 SATS  97 HR  114      Functional Outcome Measure: The patient scored 75/100 on the barthel outcome measure     Other factors to consider for discharge: per above          PLAN :  Recommendation for discharge: (in order for the patient to meet his/her long term goals)  No skilled physical therapy/ follow up rehabilitation needs identified at this time.     This discharge recommendation:  Has not yet been discussed the attending provider and/or case management    IF patient discharges home will need the following DME: none       SUBJECTIVE:   Patient stated I am much better now.     OBJECTIVE DATA SUMMARY:   HISTORY:    Past Medical History:   Diagnosis Date    Chronic renal failure     dx'd 2/2013    Diverticulitis     Hyperlipidemia     Hypertension     Peptic ulcer disease     Transient ischemic attack     2003    Urinary tract infection     recurrent      Past Surgical History:   Procedure Laterality Date    HX BREAST BIOPSY      [place]    IR PTA PERIPHERAL ARTERY  10/8/2020       Prior level of function: Independent  Personal factors and/or comorbidities impacting plan of care: per above and below    Home Situation  Home Environment: Private residence  # Steps to Enter: 0  One/Two Story Residence: One story  Living Alone: Yes (daughter and grandson live nearby)  Support Systems: Child(heath),Other Family Member(s)  Patient Expects to be Discharged to[de-identified] House  Current DME Used/Available at Home: None  Tub or Shower Type: Tub/Shower combination    EXAMINATION/PRESENTATION/DECISION MAKING:   Critical Behavior:  Neurologic State: Alert  Orientation Level: Oriented X4  Cognition: Follows commands     Hearing:   Auditory  Auditory Impairment: Hard of hearing, bilateral (no hearing aids)  Skin:  IV  Edema: WNL  Range Of Motion:  AROM: Within functional limits                       Strength:    Strength: Generally decreased, functional                    Tone & Sensation:   Tone: Normal              Sensation: Intact               Coordination:  Coordination: Within functional limits  Vision:      Functional Mobility:  Bed Mobility:  Rolling: Modified independent  Supine to Sit: Modified independent  Sit to Supine: Modified independent  Scooting: Independent  Transfers:  Sit to Stand: Stand-by assistance  Stand to Sit: Stand-by assistance                       Balance:   Sitting: Intact  Standing: Impaired  Standing - Static: Good  Standing - Dynamic : Fair  Ambulation/Gait Training:  Distance (ft): 150 Feet (ft)  Assistive Device: Gait belt  Ambulation - Level of Assistance: Stand-by assistance        Gait Abnormalities: Path deviations        Base of Support: Narrowed        Step Length: Right shortened;Left shortened                    Functional Measure:  Barthel Index:    Bathin  Bladder: 10  Bowels: 10  Groomin  Dressing: 10  Feeding: 10  Mobility: 10  Stairs: 0  Toilet Use: 10  Transfer (Bed to Chair and Back): 10  Total: 75/100       The Barthel ADL Index: Guidelines  1. The index should be used as a record of what a patient does, not as a record of what a patient could do. 2. The main aim is to establish degree of independence from any help, physical or verbal, however minor and for whatever reason. 3. The need for supervision renders the patient not independent. 4. A patient's performance should be established using the best available evidence. Asking the patient, friends/relatives and nurses are the usual sources, but direct observation and common sense are also important. However direct testing is not needed. 5. Usually the patient's performance over the preceding 24-48 hours is important, but occasionally longer periods will be relevant. 6. Middle categories imply that the patient supplies over 50 per cent of the effort. 7. Use of aids to be independent is allowed. Score Interpretation (from [address] [address])    Independent   60-79 Minimally independent   40-59 Partially dependent   20-39 Very dependent   <20 Totally dependent     -Charlie Lubin., Barthel, D.W. (1965). Functional evaluation: the Barthel Index. [facility] ([address] (1997). The Barthel activities of daily living index: self-reporting versus actual performance in the old (> or = 75 years). Journal of [address] [place] [address] 45(7), 14 [facility], J.J.M.F, eKndrick Srivastava., [facility]. (1999).  Measuring the change in disability after inpatient rehabilitation; comparison of the responsiveness of the Barthel Index and Functional [place] Measure. Journal of Neurology, Neurosurgery, and Psychiatry, 66(4), 646-687. MARIA ELENA Marshall, WILL Taylor, & David Ba M.A. (2004) Assessment of post-stroke quality of life in cost-effectiveness studies: The usefulness of the Barthel Index and the EuroQoL-5D. Quality of Life Research, 15, 095-00           Physical Therapy Evaluation Charge Determination   History Examination Presentation Decision-Making   MEDIUM  Complexity : 1-2 comorbidities / personal factors will impact the outcome/ POC  LOW Complexity : 1-2 Standardized tests and measures addressing body structure, function, activity limitation and / or participation in recreation  LOW Complexity : Stable, uncomplicated  Other outcome measures barthel  LOW       Based on the above components, the patient evaluation is determined to be of the following complexity level: LOW     Pain Rating:  none    Activity Tolerance:   Good      After treatment patient left in no apparent distress:   Sitting in chair, Call bell within reach, and Bed / chair alarm activated    COMMUNICATION/EDUCATION:   The patients plan of care was discussed with: Occupational therapist and Registered nurse. Fall prevention education was provided and the patient/caregiver indicated understanding., Patient/family have participated as able in goal setting and plan of care. , and Patient/family agree to work toward stated goals and plan of care.     Thank you for this referral.  Marry Chacko, PT   Time Calculation: 28 mins

## 2022-01-11 VITALS
TEMPERATURE: 98.1 F | OXYGEN SATURATION: 97 % | BODY MASS INDEX: 24.38 KG/M2 | RESPIRATION RATE: 16 BRPM | HEART RATE: 80 BPM | DIASTOLIC BLOOD PRESSURE: 78 MMHG | HEIGHT: 57 IN | SYSTOLIC BLOOD PRESSURE: 125 MMHG | WEIGHT: 113 LBS

## 2022-01-11 LAB
ALBUMIN SERPL-MCNC: 3.4 G/DL (ref 3.5–5)
ALBUMIN/GLOB SERPL: 1.3 {RATIO} (ref 1.1–2.2)
ALP SERPL-CCNC: 75 U/L (ref 45–117)
ALT SERPL-CCNC: 26 U/L (ref 12–78)
ANION GAP SERPL CALC-SCNC: 12 MMOL/L (ref 5–15)
AST SERPL-CCNC: 27 U/L (ref 15–37)
BILIRUB SERPL-MCNC: 0.5 MG/DL (ref 0.2–1)
BUN SERPL-MCNC: 35 MG/DL (ref 6–20)
BUN/CREAT SERPL: 17 (ref 12–20)
CALCIUM SERPL-MCNC: 9.1 MG/DL (ref 8.5–10.1)
CHLORIDE SERPL-SCNC: 118 MMOL/L (ref 97–108)
CO2 SERPL-SCNC: 17 MMOL/L (ref 21–32)
CREAT SERPL-MCNC: 2.09 MG/DL (ref 0.55–1.02)
ERYTHROCYTE [DISTWIDTH] IN BLOOD BY AUTOMATED COUNT: 13.5 % (ref 11.5–14.5)
GLOBULIN SER CALC-MCNC: 2.7 G/DL (ref 2–4)
GLUCOSE SERPL-MCNC: 100 MG/DL (ref 65–100)
HCT VFR BLD AUTO: 36.2 % (ref 35–47)
HGB BLD-MCNC: 11.5 G/DL (ref 11.5–16)
MAGNESIUM SERPL-MCNC: 2 MG/DL (ref 1.6–2.4)
MCH RBC QN AUTO: 28.6 PG (ref 26–34)
MCHC RBC AUTO-ENTMCNC: 31.8 G/DL (ref 30–36.5)
MCV RBC AUTO: 90 FL (ref 80–99)
NRBC # BLD: 0 K/UL (ref 0–0.01)
NRBC BLD-RTO: 0 PER 100 WBC
PHOSPHATE SERPL-MCNC: 3 MG/DL (ref 2.6–4.7)
PLATELET # BLD AUTO: 174 K/UL (ref 150–400)
PMV BLD AUTO: 10.3 FL (ref 8.9–12.9)
POTASSIUM SERPL-SCNC: 3.7 MMOL/L (ref 3.5–5.1)
PROT SERPL-MCNC: 6.1 G/DL (ref 6.4–8.2)
RBC # BLD AUTO: 4.02 M/UL (ref 3.8–5.2)
SODIUM SERPL-SCNC: 147 MMOL/L (ref 136–145)
WBC # BLD AUTO: 5.3 K/UL (ref 3.6–11)

## 2022-01-11 PROCEDURE — 96372 THER/PROPH/DIAG INJ SC/IM: CPT

## 2022-01-11 PROCEDURE — 74011000250 HC RX REV CODE- 250: Performed by: INTERNAL MEDICINE

## 2022-01-11 PROCEDURE — 84100 ASSAY OF PHOSPHORUS: CPT

## 2022-01-11 PROCEDURE — 74011250636 HC RX REV CODE- 250/636: Performed by: INTERNAL MEDICINE

## 2022-01-11 PROCEDURE — 85027 COMPLETE CBC AUTOMATED: CPT

## 2022-01-11 PROCEDURE — 74011250637 HC RX REV CODE- 250/637: Performed by: INTERNAL MEDICINE

## 2022-01-11 PROCEDURE — G0378 HOSPITAL OBSERVATION PER HR: HCPCS

## 2022-01-11 PROCEDURE — 36415 COLL VENOUS BLD VENIPUNCTURE: CPT

## 2022-01-11 PROCEDURE — 80053 COMPREHEN METABOLIC PANEL: CPT

## 2022-01-11 PROCEDURE — 83735 ASSAY OF MAGNESIUM: CPT

## 2022-01-11 RX ORDER — METRONIDAZOLE 500 MG/1
500 TABLET ORAL EVERY 8 HOURS
Qty: 9 TABLET | Refills: 0 | Status: SHIPPED | OUTPATIENT
Start: 2022-01-11 | End: 2022-01-14

## 2022-01-11 RX ORDER — CIPROFLOXACIN 500 MG/1
500 TABLET ORAL 2 TIMES DAILY
Qty: 6 TABLET | Refills: 0 | Status: SHIPPED | OUTPATIENT
Start: 2022-01-11 | End: 2022-01-14

## 2022-01-11 RX ADMIN — FAMOTIDINE 20 MG: 20 TABLET ORAL at 08:21

## 2022-01-11 RX ADMIN — HEPARIN SODIUM 5000 UNITS: 5000 INJECTION INTRAVENOUS; SUBCUTANEOUS at 08:19

## 2022-01-11 RX ADMIN — Medication 10 ML: at 06:13

## 2022-01-11 RX ADMIN — MYCOPHENOLATE MOFETIL 750 MG: 250 CAPSULE ORAL at 08:19

## 2022-01-11 RX ADMIN — METOPROLOL SUCCINATE 50 MG: 50 TABLET, EXTENDED RELEASE ORAL at 08:21

## 2022-01-11 RX ADMIN — HEPARIN SODIUM 5000 UNITS: 5000 INJECTION INTRAVENOUS; SUBCUTANEOUS at 01:11

## 2022-01-11 RX ADMIN — METRONIDAZOLE 500 MG: 250 TABLET ORAL at 06:13

## 2022-01-11 NOTE — PROGRESS NOTES
Problem: Airway Clearance - Ineffective  Goal: Achieve or maintain patent airway  Outcome: Progressing Towards Goal     Problem: Gas Exchange - Impaired  Goal: Absence of hypoxia  Outcome: Progressing Towards Goal  Goal: Promote optimal lung function  Outcome: Progressing Towards Goal     Problem: Breathing Pattern - Ineffective  Goal: Ability to achieve and maintain a regular respiratory rate  Outcome: Progressing Towards Goal     Problem:  Body Temperature -  Risk of, Imbalanced  Goal: Ability to maintain a body temperature within defined limits  Outcome: Progressing Towards Goal  Goal: Will regain or maintain usual level of consciousness  Outcome: Progressing Towards Goal  Goal: Complications related to the disease process, condition or treatment will be avoided or minimized  Outcome: Progressing Towards Goal     Problem: Nutrition Deficits  Goal: Optimize nutrtional status  Outcome: Progressing Towards Goal     Problem: Risk for Fluid Volume Deficit  Goal: Maintain normal heart rhythm  Outcome: Progressing Towards Goal  Goal: Maintain absence of muscle cramping  Outcome: Progressing Towards Goal  Goal: Maintain normal serum potassium, sodium, calcium, phosphorus, and pH  Outcome: Progressing Towards Goal     Problem: Loneliness or Risk for Loneliness  Goal: Demonstrate positive use of time alone when socialization is not possible  Outcome: Progressing Towards Goal     Problem: Fatigue  Goal: Verbalize increase energy and improved vitality  Outcome: Progressing Towards Goal     Problem: Patient Education: Go to Patient Education Activity  Goal: Patient/Family Education  Outcome: Progressing Towards Goal     Problem: Patient Education: Go to Patient Education Activity  Goal: Patient/Family Education  Outcome: Progressing Towards Goal

## 2022-01-11 NOTE — PROGRESS NOTES
1/11/2022 1:48 PM EMR reviewed, pt to discharge home today. No discharge needs noted at this time. Family to transport pt home. WANDER Tafoya     Care Management Interventions  PCP Verified by CM: Yes (Areli)  Mode of Transport at Discharge:  Other (see comment)  MyChart Signup: No  Discharge Durable Medical Equipment: No  Physical Therapy Consult: Yes  Occupational Therapy Consult: No  Speech Therapy Consult: No  Support Systems: Child(heath)  Confirm Follow Up Transport: Family  Discharge Location  Discharge Placement: Home with family assistance

## 2022-01-11 NOTE — PROGRESS NOTES
1440: Discussed discharge instructions and summary with patient and family on telephone. Patient and family verbalized understanding of instructions and medications. Handed patient prescription scripts. Patient signed discharge paperwork via e-sign. Opportunity for questions/clarification provided. VSS. IV removed with no complication. Pt took all belongings with them. Problem: Airway Clearance - Ineffective  Goal: Achieve or maintain patent airway  Outcome: Progressing Towards Goal     Problem: Gas Exchange - Impaired  Goal: Absence of hypoxia  Outcome: Progressing Towards Goal  Goal: Promote optimal lung function  Outcome: Progressing Towards Goal     Problem: Breathing Pattern - Ineffective  Goal: Ability to achieve and maintain a regular respiratory rate  Outcome: Progressing Towards Goal     Problem:  Body Temperature -  Risk of, Imbalanced  Goal: Ability to maintain a body temperature within defined limits  Outcome: Progressing Towards Goal  Goal: Will regain or maintain usual level of consciousness  Outcome: Progressing Towards Goal  Goal: Complications related to the disease process, condition or treatment will be avoided or minimized  Outcome: Progressing Towards Goal     Problem: Isolation Precautions - Risk of Spread of Infection  Goal: Prevent transmission of infectious organism to others  Outcome: Progressing Towards Goal     Problem: Nutrition Deficits  Goal: Optimize nutrtional status  Outcome: Progressing Towards Goal     Problem: Risk for Fluid Volume Deficit  Goal: Maintain normal heart rhythm  Outcome: Progressing Towards Goal  Goal: Maintain absence of muscle cramping  Outcome: Progressing Towards Goal  Goal: Maintain normal serum potassium, sodium, calcium, phosphorus, and pH  Outcome: Progressing Towards Goal     Problem: Loneliness or Risk for Loneliness  Goal: Demonstrate positive use of time alone when socialization is not possible  Outcome: Progressing Towards Goal     Problem: Fatigue  Goal: Verbalize increase energy and improved vitality  Outcome: Progressing Towards Goal     Problem: Patient Education: Go to Patient Education Activity  Goal: Patient/Family Education  Outcome: Progressing Towards Goal

## 2022-01-11 NOTE — DISCHARGE INSTRUCTIONS
Patient Discharge Instructions    Anton Blackburn / [id number] : 1933    Admitted 2022 Discharged: 2022     Primary Diagnoses  Problem List as of 2022 Date Reviewed: 2021           Sinus tachycardia   Weakness   Nausea & vomiting   Pneumonia due to COVID-19 virus   * (Principal) Acute diverticulitis   CKD (chronic kidney disease) stage 4, GFR 15-29 ml/min (formerly [facility])   HTN (hypertension), benign   GERD (gastroesophageal reflux disease)   Anxiety   MCI (mild cognitive impairment)          Take Home Medications     · It is important that you take the medication exactly as they are prescribed. · Keep your medication in the bottles provided by the pharmacist and keep a list of the medication names, dosages, and times to be taken in your wallet. · Do not take other medications without consulting your doctor. What to do at Home    Recommended diet: Cardiac Diet    Recommended activity: Activity as tolerated    If you experience worse symptoms, please follow up with your PCP. Follow-up with your PCP in a few weeks    Follow-up Information     Follow up With Specialties Details Why Contact Info    Babs Singleton MD Internal Medicine Schedule an appointment as soon as possible for a visit in 1 [address]  [phone]             Information obtained by :  I understand that if any problems occur once I am at home I am to contact my physician. I understand and acknowledge receipt of the instructions indicated above.                                                                                                                                            Physician's or R.N.'s Signature                                                                  Date/Time                                                                                                                                              Patient or Representative Signature Date/Time

## 2022-01-11 NOTE — PROGRESS NOTES
[facility]    Medical Progress Note      NAME: Erica Nunez   :  1933  MRM:  [id number]    Date/Time of service 2022  11:43 AM          Assessment and Plan:     Acute diverticulitis - Noted on CT, but she has no abd pain, good appetite and no sepsis criteria. Incidental finding. Got IV levaquin and flagyl. Follow cx. Short course PO cipro/flagyl at home. Acute on CKD (chronic kidney disease) stage 4 - POA, worse due to IVVD, then better after hydration. CO2 stable, I suspect chronic. Resume cellcept. Pneumonia due to COVID-19 virus - POA, mild case. Low inflammatory markers, no hypoxia. This requires no treatment. Hypotension / Sinus tachycardia / hx HTN (hypertension) - BP low and pulse high due to dehydration initially, then normalized after hydration. Continue metoprolol, resume losartan, but not Norvasc. Anxiety / MCI (mild cognitive impairment) - Supporitve care. Continue sertraline    Nausea & vomiting / GERD (gastroesophageal reflux disease) - POA, triggered by COVID, less likely diverticulitis. Full liquid diet. ADAT. Pepcid. Weakness - POA due to COVID and age. Fall precautions. PT OT eval       Subjective:     Chief Complaint:  Nausea and weakness much better, wants to go home    ROS:  (bold if positive, if negative)    Tolerating PT  Tolerating Diet        Objective:     Last 24hrs VS reviewed since prior progress note.  Most recent are:    Visit Vitals  /86 (BP 1 Location: Left upper arm, BP Patient Position: At rest)   Pulse 98   Temp 98 °F (36.7 °C)   Resp 16   Ht 4' 9.01\" (1.448 m)   Wt 51.3 kg (113 lb)   SpO2 96%   BMI 24.45 kg/m²     SpO2 Readings from Last 6 Encounters:   22 96%   22 100%   21 98%   21 99%   20 99%   20 99%            Intake/Output Summary (Last 24 hours) at 2022 0700  Last data filed at 2022 0053  Gross per 24 hour   Intake 720 ml   Output --   Net 720 ml        Physical Exam:    Gen:  Well-developed, well-nourished, in no acute distress  HEENT:  Pink conjunctivae, PERRL, hearing intact to voice, moist mucous membranes  Neck:  Supple, without masses, thyroid non-tender  Resp:  No accessory muscle use, clear breath sounds without wheezes rales or rhonchi  Card:  No murmurs, tachycardic S1, S2 without thrills, bruits or peripheral edema  Abd:  Soft, non-tender, non-distended, normoactive bowel sounds are present, no mass  Lymph:  No cervical or inguinal adenopathy  Musc:  No cyanosis or clubbing  Skin:  No rashes or ulcers, skin turgor is good  Neuro:  Cranial nerves are grossly intact, general motor weakness, follows commands appropriately  Psych:  Good insight, oriented to person, place and time, alert    Telemetry reviewed:   normal sinus rhythm  __________________________________________________________________  Medications Reviewed: (see below)  Medications:     Current Facility-Administered Medications   Medication Dose Route Frequency    dextrose 5% - 0.45% NaCl with KCl 20 mEq/L infusion  100 mL/hr IntraVENous CONTINUOUS    famotidine (PEPCID) tablet 20 mg  20 mg Oral BID    metoprolol succinate (TOPROL-XL) XL tablet 50 mg  50 mg Oral DAILY    sertraline (ZOLOFT) tablet 25 mg  25 mg Oral QHS    mycophenolate mofetil (CELLCEPT) capsule 750 mg  750 mg Oral BID    metroNIDAZOLE (FLAGYL) tablet 500 mg  500 mg Oral Q8H    sodium chloride (NS) flush 5-40 mL  5-40 mL IntraVENous Q8H    sodium chloride (NS) flush 5-40 mL  5-40 mL IntraVENous PRN    acetaminophen (TYLENOL) tablet 650 mg  650 mg Oral Q6H PRN    polyethylene glycol (MIRALAX) packet 17 g  17 g Oral DAILY PRN    ondansetron (ZOFRAN) injection 4 mg  4 mg IntraVENous Q6H PRN    guaiFENesin-dextromethorphan (ROBITUSSIN DM) 100-10 mg/5 mL syrup 5 mL  5 mL Oral Q4H PRN    levoFLOXacin (LEVAQUIN) 500 mg in D5W IVPB  500 mg IntraVENous Q48H    heparin (porcine) injection 5,000 Units  5,000 Units SubCUTAneous Q8H    ipratropium-albuterol (COMBIVENT RESPIMAT) 20 mcg-100 mcg inhalation spray  1 Puff Inhalation Q4H PRN        Lab Data Reviewed: (see below)  Lab Review:     Recent Labs     01/11/22  0242 01/10/22  0356 01/09/22  1432   WBC 5.3 4.2 6.3   HGB 11.5 10.7* 12.9   HCT 36.2 33.5* 40.5    154 196     Recent Labs     01/11/22  0242 01/10/22  0356 01/09/22  1432   * 143 139   K 3.7 3.4* 5.0   * 115* 112*   CO2 17* 18* 13*    106* 135*   BUN 35* 47* 54*   CREA 2.09* 2.42* 2.88*   CA 9.1 8.3* 9.5   MG 2.0  --  2.5*   PHOS 3.0  --   --    ALB 3.4* 3.1* 3.8   TBILI 0.5 0.3 0.4   ALT 26 28 35     No results found for: GLUCPOC  No results for input(s): PH, PCO2, PO2, HCO3, FIO2 in the last 72 hours. No results for input(s): INR, INREXT, INREXT in the last 72 hours. All Micro Results     Procedure Component Value Units Date/Time    COVID-19 RAPID TEST [[id number]]  (Abnormal) Collected: 01/09/22 2037    Order Status: Completed Specimen: Nasopharyngeal Updated: 01/09/22 2112     Specimen source Nasopharyngeal        COVID-19 rapid test Detected        Comment: Rapid Abbott ID Now       The specimen is POSITIVE for SARS-CoV-2, the novel coronavirus associated with COVID-19. This test has been authorized by the FDA under an Emergency Use Authorization (EUA) for use by authorized laboratories. Fact sheet for Healthcare Providers: ConventionUpdate.co.nz  Fact sheet for Patients: ConventionUpdate.co.nz       Methodology: Isothermal Nucleic Acid Amplification  CALLED TO AND READ BACK BY  MEDHAT TATUM AT 2112. MG         URINE CULTURE HOLD SAMPLE [[id number]] Collected: 01/09/22 1437    Order Status: Completed Specimen: Serum Updated: 01/09/22 1450     Urine culture hold       Urine on hold in Microbiology dept for 2 days. If unpreserved urine is submitted, it cannot be used for addtional testing after 24 hours, recollection will be required.                 Other pertinent lab: none    Total time spent with patient: 30 Minutes I personally reviewed chart, notes, data and current medications in the medical record. I have personally examined and treated the patient at bedside during this period.                  Care Plan discussed with: Patient, Care Manager, Nursing Staff and >50% of time spent in counseling and coordination of care    Discussed:  Care Plan and D/C Planning    Prophylaxis:  Lovenox and H2B/PPI    Disposition:  Home w/Family           ___________________________________________________    Attending Physician: Gloria Degroot MD

## 2022-01-11 NOTE — PROGRESS NOTES
Physician Progress Note      Pablo Ko  CSN #:                  [id number]  :                       1933  ADMIT DATE:       2022 1:22 PM  [address] [place] DATE:  RESPONDING  PROVIDER #:        Katherine Vega MD          QUERY TEXT:    Dear Attending,    Pt admitted with acute diverticulitis, noted to have low serum CO2 on admission. If possible, please document in the progress notes and discharge summary if you are evaluating and/or treating any of the following: The medical record reflects the following:  Risk Factors: CKD, decreased po intake, acute diverticulitis  Clinical Indicators: pt to ED for eval of decreased po intake  - serum CO2 13 on admission with range 13-18 during admission  - H&P: CKD (chronic kidney disease) stage 4, GFR 15-29 ml/min ([facility]) (2022) / Hyperkalemia POA: near baseline. Hydrate with IV bicarb, follow renal function  Treatment: bolus (1L) in ED, sodium bicarb IV, monitoring      Thank you,    Sarath Adrian RN  [facility]  [phone]  Options provided:  -- Metabolic Acidosis  -- Clinically insignificant low serum CO2  -- Other - I will add my own diagnosis  -- Disagree - Not applicable / Not valid  -- Disagree - Clinically unable to determine / Unknown  -- Refer to Clinical Documentation Reviewer    PROVIDER RESPONSE TEXT:    This patient has low serum CO2 which is clinically insignificant.     Query created by: Mark Joya on 2022 11:55 AM      Electronically signed by:  Katherine Vega MD 2022 2:20 PM

## 2022-01-11 NOTE — DISCHARGE SUMMARY
Physician Discharge Summary     Patient ID:  Praneeth Orta  [id number]  80 y.o.  6/16/1933    Admit date: 1/9/2022    Discharge date of service and time: 1/11/2022    Admission Diagnoses: Acute diverticulitis [K57.92]    Discharge Diagnoses:    Principal Diagnosis   Acute diverticulitis                                             Hospital Course and other diagnoses  Acute diverticulitis - Noted on CT, but she has no abd pain, good appetite and no sepsis criteria. Incidental finding. Got IV levaquin and flagyl. Follow cx. Short course PO cipro/flagyl at home.     Acute on CKD (chronic kidney disease) stage 4 - POA, worse due to IVVD, then better after hydration. CO2 stable, I suspect chronic. Resume cellcept.     Pneumonia due to COVID-19 virus - POA, mild case. Low inflammatory markers, no hypoxia. This requires no treatment.     Hypotension / Sinus tachycardia / hx HTN (hypertension) - BP low and pulse high due to dehydration initially, then normalized after hydration. Continue metoprolol, resume losartan, but not Norvasc.     Anxiety / MCI (mild cognitive impairment) - Supporitve care. Continue sertraline     Nausea & vomiting / GERD (gastroesophageal reflux disease) - POA, triggered by COVID, less likely diverticulitis. Full liquid diet. ADAT. Pepcid.     Weakness - POA due to COVID and age. Fall precautions. PT OT eval     PCP: Kiana Lin MD    Consults: None    Significant Diagnostic Studies: See Hospital Course    Discharged home in improved condition.     Discharge Exam:  /86 (BP 1 Location: Left upper arm, BP Patient Position: At rest)   Pulse 98   Temp 98 °F (36.7 °C)   Resp 16   Ht 4' 9.01\" (1.448 m)   Wt 51.3 kg (113 lb)   SpO2 96%   BMI 24.45 kg/m²      Gen:  Well-developed, well-nourished, in no acute distress  HEENT:  Pink conjunctivae, PERRL, hearing intact to voice, moist mucous membranes  Neck:  Supple, without masses, thyroid non-tender  Resp:  No accessory muscle use, clear breath sounds without wheezes rales or rhonchi  Card:  No murmurs, tachycardic S1, S2 without thrills, bruits or peripheral edema  Abd:  Soft, non-tender, non-distended, normoactive bowel sounds are present, no mass  Lymph:  No cervical or inguinal adenopathy  Musc:  No cyanosis or clubbing  Skin:  No rashes or ulcers, skin turgor is good  Neuro:  Cranial nerves are grossly intact, general motor weakness, follows commands appropriately  Psych:  Good insight, oriented to person, place and time, alert    Patient Instructions:   Current Discharge Medication List      START taking these medications    Details   metroNIDAZOLE (FLAGYL) 500 mg tablet Take 1 Tablet by mouth every eight (8) hours for 3 days. Qty: 9 Tablet, Refills: 0      ciprofloxacin HCl (CIPRO) 500 mg tablet Take 1 Tablet by mouth two (2) times a day for 3 days. Indications: infection within the abdomen  Qty: 6 Tablet, Refills: 0         CONTINUE these medications which have NOT CHANGED    Details   metoprolol succinate (Toprol XL) 50 mg XL tablet Take 50 mg by mouth daily. sertraline (ZOLOFT) 25 mg tablet Take 25 mg by mouth nightly. OTHER,NON-FORMULARY, Blood sugar management herbal supplement one pill daily      pantoprazole (PROTONIX) 40 mg tablet TAKE 1 TABLET EVERY DAY  Qty: 90 Tablet, Refills: 0    Associated Diagnoses: Gastroesophageal reflux disease      ondansetron (ZOFRAN ODT) 4 mg disintegrating tablet Take 1 Tablet by mouth every eight (8) hours as needed for Nausea or Vomiting. Qty: 20 Tablet, Refills: 1    Associated Diagnoses: Nausea      mycophenolate mofetil (CELLCEPT) 250 mg capsule Take 750 mg by mouth two (2) times a day. aspirin 81 mg chewable tablet Take 81 mg by mouth daily. red yeast rice extract 600 mg cap Take 600 mg by mouth daily. losartan (COZAAR) 25 mg tablet Take 25 mg by mouth daily.   Refills: 1         STOP taking these medications       amLODIPine (NORVASC) 5 mg tablet Comments:   Reason for Stopping:             Activity: Activity as tolerated  Diet: Cardiac Diet  Wound Care: None needed    Follow-up with your PCP in a few weeks.   Follow-up tests/labs - none    Signed:  Azael Allen MD  1/11/2022  7:05 AM

## 2022-01-11 NOTE — PROGRESS NOTES
IV access attempted x2 with pt accidentally removing replacement IV. Agreed with pt that we will remain without IV until time to draw AM labs and then one will be placed and blood drawn before restarting IVF, pt agreed.

## 2022-01-12 ENCOUNTER — PATIENT OUTREACH (OUTPATIENT)
Dept: CASE MANAGEMENT | Age: 87
End: 2022-01-12

## 2022-01-12 NOTE — ACP (ADVANCE CARE PLANNING)
Patient's daughter, Ander Vargas (on PHI dated 1-5-2022), states patient does not have an ACP document, education provided to patient's daughter.

## 2022-01-12 NOTE — PROGRESS NOTES
Care Transitions Initial Call    Call within 2 business days of discharge: Yes     Patient: Eve Barr Patient : 1933 MRN: [id number]    [facility]       Complaint Diagnosis Description Type Department Provider    22 Fatigue; Nausea Diverticulitis of large intestine without perforation or abscess without bleeding . .. ED to Hosp-Admission (Discharged) (ADMIT) Wilton Solorzano MD; Joseph Abraham. .. Was this an external facility discharge? No     Challenges to be reviewed by the provider   Additional needs identified to be addressed with provider:  yes  - Daughter states patient has ongoing fatigue and weakness, and daughter states patient has had approximately six episodes of nausea since discharge; however daughter states patient has not vomited. - Daughter states patient is utilizing a regular diet at home, appetite is poor. Daughter reports patient has lost approximately 15 pounds over the last two  months.        Method of communication with provider : chart routing    Component      Latest Ref Rng & Units 2022 1/10/2022 2022           2:42 AM  3:56 AM  2:32 PM   Sodium      136 - 145 mmol/L 147 (H) 143 139     Component      Latest Ref Rng & Units 2022 1/10/2022 2022           2:42 AM  3:56 AM  2:32 PM   Chloride      97 - 108 mmol/L 118 (H) 115 (H) 112 (H)   CO2      21 - 32 mmol/L 17 (L) 18 (L) 13 (LL)     Component      Latest Ref Rng & Units 2022 1/10/2022 2022           2:42 AM  3:56 AM  2:32 PM   BUN      6 - 20 MG/DL 35 (H) 47 (H) 54 (H)   Creatinine      0.55 - 1.02 MG/DL 2.09 (H) 2.42 (H) 2.88 (H)   BUN/Creatinine ratio      12 - 20   17 19 19   GFR est AA      >60 ml/min/1.73m2 27 (L) 23 (L) 19 (L)   GFR est non-AA      >60 ml/min/1.73m2 22 (L) 19 (L) 15 (L)     Component      Latest Ref Rng & Units 2022 1/10/2022 2022           2:42 AM  3:56 AM  2:32 PM   Protein, total      6.4 - 8.2 g/dL 6.1 (L) 5.9 (L) 7.7 Albumin      3.5 - 5.0 g/dL 3.4 (L) 3.1 (L) 3.8     Component      Latest Ref Rng & Units 1/9/2022           2:32 PM   Lipase      73 - 393 U/L 824 (H)     Component      Latest Ref Rng & Units 1/10/2022           3:56 AM   C-Reactive protein      0.00 - 0.60 mg/dL 1.13 (H)     Component      Latest Ref Rng & Units 1/10/2022 1/9/2022           3:56 AM  2:32 PM   IMMATURE GRANULOCYTES      0.0 - 0.5 % 1 (H) 0     Component      Latest Ref Rng & Units 1/9/2022           2:37 PM   Protein      NEG mg/dL 100 (A)     Component      Latest Ref Rng & Units 1/9/2022           2:37 PM   Blood      NEG   SMALL (A)     Component      Latest Ref Rng & Units 1/9/2022           2:37 PM   Bacteria      NEG /hpf 2+ (A)     Discussed COVID-19 related testing which was available at this time. Test results were positive. Patient informed of results, if available? N/A, telephonic assessment completed with patient's daughter, Wing Colon (on [address] dated 1-5-2022). Advance Care Planning:   Does patient have an Advance Directive:  Daughter states patient does nto have an ACP document, education provided to patient's daughter. Inpatient Readmission Risk score: Unplanned Readmit Risk Score: 11.1 ( )    Was this a readmission? no   Patient stated reason for the admission: N/A, telephonic assessment completed with patient's daughter. Patients top risk factors for readmission: Medical condition - hypertension, recent acute diverticulitis, recent nausea and vomiting, and recent pneumonia due to Covid-19 virus per chart. Interventions to address risk factors: Obtained and reviewed discharge summary and/or continuity of care documents and Education of patient/family/caregiver/guardian to support self-management-Education provided regarding signs/symptoms of diverticulitis and Covid-19 virus, patient's daughter verbalized an understanding.      Care Transition Nurse (CTN) contacted the patient's daughter, Wing Colon (on [address] dated 2022), by telephone to perform post hospital discharge assessment. Verified name and  with the daughter as identifiers. Provided introduction to self, and explanation of the CTN role. CTN reviewed discharge instructions, medical action plan and red flags with the daughter who verbalized understanding. Were discharge instructions available to patient? yes. Reviewed appropriate site of care based on symptoms and resources available to patient including: PCP and When to call 911. Daughter was given an opportunity to ask questions and does not have any further questions or concerns at this time. The daughter agrees to contact the PCP office for questions related to patient's healthcare. Medication reconciliation was not performed with the patient's daughter during this phone conversation as daughter states she is not familiar with patient's medications. Daughter states patient's medications are prepared for administration utilizing a weekly pill box that is prepared by patient's nephew. Daughter states patient's nephew is not available for medication reconciliation at this time. Advised obtaining a 90-day supply of all daily and as-needed medications. Referral to Pharm D needed: Unknown at this time. Home Health/Outpatient orders at discharge: [address]: n/a  Date of initial visit: [address] [facility] ordered at discharge: None  [address] [facility]: n/a  Durable Medical Equipment received: n/a    Covid Risk Education    Educated patient's daughter about risk for severe COVID-19 due to risk factors according to CDC guidelines. CTN reviewed discharge instructions, medical action plan and red flag symptoms with the patient's daughter who verbalized understanding. Discussed COVID vaccination status: yes. Education provided on COVID-19 vaccination as appropriate. Discussed exposure protocols and quarantine with CDC Guidelines.  The daughter was given an opportunity to verbalize any questions and concerns and agrees to contact CTN or health care provider for questions related to patient's healthcare. Was patient discharged with a pulse oximeter? no.    Discussed follow-up appointments. If no appointment was previously scheduled, appointment scheduling offered: yes. Is follow up appointment scheduled within 7 days of discharge? No, patient has [facility] appointment scheduled for 1-21-22 (within 14 days of discharge) and first available appointment per daughter. [facility] follow up appointment(s):   Future Appointments   Date Time Provider Rigoberto Conteh   1/21/2022  9:00 AM Benja Singleton MD Critical access hospital BS [facility]     Non-[facility] follow up appointment(s): None noted at this time. Plan for follow-up call in 10-14 days based on severity of symptoms and risk factors. Plan for next call: self management-Review red flags of diverticulitis and Covid-19 virus, and follow up appointment-Evaluate if patient is attending follow-up appointment(s) as scheduled, offer assistance with scheduling as needed. CTN provided contact information for future needs. Goals Addressed                 This Visit's Progress     Understands red flags post discharge. 1-12-22: Red flags of Covid-19 virus and diverticulitis reviewed with patient's daughter, Willian Cross (on PHI dated 1-5-2022), and daughter verbalized an understanding. Daughter denies patient having fever/chills, denies chest pain, and denies shortness of breath since discharge on 1-11-22. Daughter states patient has ongoing fatigue and weakness, and daughter states patient has had approximately six episodes of nausea since discharge; however daughter states patient has not vomited. Daughter states patient is utilizing a regular diet at home, appetite is poor. Daughter reports patient has lost approximately 15 pounds over the last two  months. Daughter states patient has not had any falls in the last twelve months.  Care Transitions Nurse will review red flags again on next phone conversation with patient/daughter.  Faiaz Ramos.

## 2022-01-13 ENCOUNTER — TELEPHONE (OUTPATIENT)
Dept: INTERNAL MEDICINE CLINIC | Age: 87
End: 2022-01-13

## 2022-01-13 ENCOUNTER — VIRTUAL VISIT (OUTPATIENT)
Dept: INTERNAL MEDICINE CLINIC | Age: 87
End: 2022-01-13
Payer: MEDICARE

## 2022-01-13 DIAGNOSIS — U07.1 COVID-19: ICD-10-CM

## 2022-01-13 DIAGNOSIS — K57.92 ACUTE DIVERTICULITIS: ICD-10-CM

## 2022-01-13 DIAGNOSIS — K57.92 ACUTE DIVERTICULITIS: Primary | ICD-10-CM

## 2022-01-13 DIAGNOSIS — I10 ESSENTIAL HYPERTENSION: ICD-10-CM

## 2022-01-13 DIAGNOSIS — N18.4 CHRONIC KIDNEY DISEASE, STAGE IV (SEVERE) (HCC): ICD-10-CM

## 2022-01-13 DIAGNOSIS — F32.1 CURRENT MODERATE EPISODE OF MAJOR DEPRESSIVE DISORDER WITHOUT PRIOR EPISODE (HCC): Primary | ICD-10-CM

## 2022-01-13 DIAGNOSIS — R63.4 WEIGHT LOSS: ICD-10-CM

## 2022-01-13 PROCEDURE — 99496 TRANSJ CARE MGMT HIGH F2F 7D: CPT | Performed by: INTERNAL MEDICINE

## 2022-01-13 PROCEDURE — G8427 DOCREV CUR MEDS BY ELIG CLIN: HCPCS | Performed by: INTERNAL MEDICINE

## 2022-01-13 RX ORDER — MEGESTROL ACETATE 20 MG/1
20 TABLET ORAL DAILY
Qty: 30 TABLET | Refills: 1 | Status: SHIPPED | OUTPATIENT
Start: 2022-01-13

## 2022-01-13 NOTE — PROGRESS NOTES
Conner Foster (: 1933) is a 80 y.o. female, established patient, here for evaluation of the following chief complaint(s):   Hospital Follow Up       ASSESSMENT/PLAN:  Below is the assessment and plan developed based on review of pertinent history, labs, studies, and medications. 1. Current moderate episode of major depressive disorder without prior episode ([facility] [address]  I am comfortable with her discontinuing Zoloft given how small of a dose she is on. If they do not notice an improvement after discontinuing Zoloft, resume it. 2. Chronic kidney disease, stage IV (severe) ([address]  I recommended that her family talk to the nephrologist (Dr. Marcy Hodgson) before giving her ensure or the Megace that I rx. 3. Essential hypertension  BP is at goal. I do not recommend any change in medications (losartan). 4. COVID-19  I am suspicious that her lack of appetite is due to COVID-19 associated loss of taste and smell. 5. Acute diverticulitis  I believe that the discovered diverticulitis is acute and not the cause of her weight loss. Continue course of abx are described. 6. Weight loss  -     megestroL (MEGACE) 20 mg tablet; Take 1 Tablet by mouth daily. , Normal, Disp-30 Tablet, R-1  I am suspicious that her lack of appetite is due to COVID-19 associated loss of taste and smell. I recommended that her family talk to the nephrologist (Dr. Marcy Hodgson) before giving her ensure or the Megace that I rx. No follow-ups on file. SUBJECTIVE/OBJECTIVE:  HPI    Pt presented to the ED on 22 with c/o weakness and loss of appetite, where was treated for diverticulitis with IV Flagyl and Levaquin and PO Cipro and Flagyl. She was found to have COVID-19 pneumonia, but received no treatment since she was not hypoxic. Pt was exposed around [place] and had a bad HA on 21, but that was her only COVID like sx until she presented to the ED. Her daughter notes progressive weight loss since she was diagnosed with stage 3 CKD.  Pt may be depressed given the loss of her , sister, and son. Review of Systems   Constitutional: Positive for appetite change and unexpected weight change. All other systems reviewed and are negative. No data recorded     Physical Exam    [INSTRUCTIONS:  \"[x]\" Indicates a positive item  \"[]\" Indicates a negative item  -- DELETE ALL ITEMS NOT EXAMINED]    Constitutional: [x] Appears well-developed and well-nourished [x] No apparent distress      [] Abnormal -     Mental status: [x] Alert and awake  [x] Oriented to person/place/time [x] Able to follow commands    [] Abnormal -     Eyes:   EOM    [x]  Normal    [] Abnormal -   Sclera  [x]  Normal    [] Abnormal -          Discharge [x]  None visible   [] Abnormal -     HENT: [x] Normocephalic, atraumatic  [] Abnormal -   [x] Mouth/Throat: Mucous membranes are moist    External Ears [x] Normal  [] Abnormal -    Neck: [x] No visualized mass [] Abnormal -     Pulmonary/Chest: [x] Respiratory effort normal   [x] No visualized signs of difficulty breathing or respiratory distress        [] Abnormal -      Musculoskeletal:   [x] Normal gait with no signs of ataxia         [x] Normal range of motion of neck        [] Abnormal -     Neurological:        [x] No Facial Asymmetry (Cranial nerve 7 motor function) (limited exam due to video visit)          [x] No gaze palsy        [] Abnormal -          Skin:        [x] No significant exanthematous lesions or discoloration noted on facial skin         [] Abnormal -            Psychiatric:       [x] Normal Affect [] Abnormal -        [x] No Hallucinations    Other pertinent observable physical exam findings:-        On this date 01/13/2022 I have spent 40 minutes reviewing previous notes, test results and face to face (virtual) with the patient discussing the diagnosis and importance of compliance with the treatment plan as well as documenting on the day of the visitRoz Rocha was evaluated through a synchronous (real-time) audio-video encounter. The patient (or guardian if applicable) is aware that this is a billable service. Verbal consent to proceed has been obtained within the past 12 months. The visit was conducted pursuant to the emergency declaration under the [facility], [address] [place] [address] authority and the [place] [employer] [place] [employer] Act. Patient identification was verified, and a caregiver was present when appropriate. The patient was located in a state where the provider was credentialed to provide care. An electronic signature was used to authenticate this note. Written by Magan Carbajal as dictated by Dr. Franklyn Herrera.    -- Magan Carbajal

## 2022-01-13 NOTE — TELEPHONE ENCOUNTER
----- Message from George Montoya sent at 1/13/2022  9:49 AM EST -----  Subject: Message to Provider    QUESTIONS  Information for Provider? daughter calling as pt was discharged from   hospital on 1/11 from [facility] [address] is currently only drinking cold water   and will not eat Would like information on how to get home health care as   insurance has said need Dr to sign off on it. Please call with   instructions  ---------------------------------------------------------------------------  --------------  CALL BACK INFO  What is the best way for the office to contact you? OK to leave message on   voicemail  Preferred Call Back Phone Number?  [phone]  ---------------------------------------------------------------------------  --------------  SCRIPT ANSWERS  undefined

## 2022-01-13 NOTE — TELEPHONE ENCOUNTER
----- Message from Thao Dylan sent at 1/13/2022  9:46 AM EST -----  Regarding: labs  Good Morning Dr Sparks/ Nurse who may be reading on her behalf,    This is Elan Solis, the grandson of the patient. My grandmothers seems to be declining. We reached out to [facility] regarding a home health order to have vitals and labs drawn for her Nephrologist. They stated that said we need to get in touch with her PCP to initiate home health care. Is it possible to begin this process as shes getting weaker/more confused by the day, is still not eating, and had a near fall yesterday. If you could either reply via [facility] or contact her Daughter Omid Glynn at [phone]. Thank you for your attention to this matter and have a wonderful day!     Elan Solis

## 2022-01-13 NOTE — TELEPHONE ENCOUNTER
Returned call to patient's daughter Cheli Peters. She expresses since her mother has been d/c from the hospital she has not been eating anything & only drinking cold water. The daughter expresses her mother is 113 lbs & very fragile & she cannot go on without eating. Advised a [facility] appt today to follow up & discuss this issue further with . Cheli Fran agreed & appt scheduled for today at 2:15. Cheli Peters was thankful for the return call.

## 2022-01-13 NOTE — TELEPHONE ENCOUNTER
Home healthy - [facility]ed[facility]  Or [facility] sent - eval and treat after being in hospital- history covid 19 /diverticulitis

## 2022-01-14 ENCOUNTER — TELEPHONE (OUTPATIENT)
Dept: INTERNAL MEDICINE CLINIC | Age: 87
End: 2022-01-14

## 2022-01-14 NOTE — TELEPHONE ENCOUNTER
----- Message from [place] sent at 1/14/2022  1:13 PM EST -----  Subject: Message to Provider    QUESTIONS  Information for Provider? pt referal for home health care was denied, they   arent able to take [employer].  ---------------------------------------------------------------------------  --------------  [address]  What is the best way for the office to contact you? OK to leave message on   voicemail  Preferred Call Back Phone Number? [phone]  ---------------------------------------------------------------------------  --------------  SCRIPT ANSWERS  Relationship to Patient?  Third Party  Representative Name? Varghese alba

## 2022-01-15 ENCOUNTER — APPOINTMENT (OUTPATIENT)
Dept: GENERAL RADIOLOGY | Age: 87
DRG: 177 | End: 2022-01-15
Attending: STUDENT IN AN ORGANIZED HEALTH CARE EDUCATION/TRAINING PROGRAM
Payer: MEDICARE

## 2022-01-15 ENCOUNTER — HOSPITAL ENCOUNTER (INPATIENT)
Age: 87
LOS: 3 days | Discharge: HOSPICE | DRG: 177 | End: 2022-01-18
Attending: STUDENT IN AN ORGANIZED HEALTH CARE EDUCATION/TRAINING PROGRAM | Admitting: INTERNAL MEDICINE
Payer: MEDICARE

## 2022-01-15 ENCOUNTER — APPOINTMENT (OUTPATIENT)
Dept: CT IMAGING | Age: 87
DRG: 177 | End: 2022-01-15
Attending: STUDENT IN AN ORGANIZED HEALTH CARE EDUCATION/TRAINING PROGRAM
Payer: MEDICARE

## 2022-01-15 DIAGNOSIS — R77.8 TROPONIN LEVEL ELEVATED: ICD-10-CM

## 2022-01-15 DIAGNOSIS — N18.4 CKD (CHRONIC KIDNEY DISEASE) STAGE 4, GFR 15-29 ML/MIN (HCC): ICD-10-CM

## 2022-01-15 DIAGNOSIS — R00.0 SINUS TACHYCARDIA: ICD-10-CM

## 2022-01-15 DIAGNOSIS — J12.82 PNEUMONIA DUE TO COVID-19 VIRUS: Primary | ICD-10-CM

## 2022-01-15 DIAGNOSIS — U07.1 PNEUMONIA DUE TO COVID-19 VIRUS: Primary | ICD-10-CM

## 2022-01-15 DIAGNOSIS — R09.02 HYPOXIA: ICD-10-CM

## 2022-01-15 DIAGNOSIS — R41.0 ACUTE DELIRIUM: ICD-10-CM

## 2022-01-15 DIAGNOSIS — R41.82 ALTERED MENTAL STATUS, UNSPECIFIED ALTERED MENTAL STATUS TYPE: ICD-10-CM

## 2022-01-15 LAB
ALBUMIN SERPL-MCNC: 3.2 G/DL (ref 3.5–5)
ALBUMIN/GLOB SERPL: 1 {RATIO} (ref 1.1–2.2)
ALP SERPL-CCNC: 66 U/L (ref 45–117)
ALT SERPL-CCNC: 35 U/L (ref 12–78)
ANION GAP SERPL CALC-SCNC: 7 MMOL/L (ref 5–15)
AST SERPL-CCNC: 38 U/L (ref 15–37)
BASOPHILS # BLD: 0 K/UL (ref 0–0.1)
BASOPHILS NFR BLD: 0 % (ref 0–1)
BILIRUB SERPL-MCNC: 0.3 MG/DL (ref 0.2–1)
BUN SERPL-MCNC: 66 MG/DL (ref 6–20)
BUN/CREAT SERPL: 21 (ref 12–20)
CALCIUM SERPL-MCNC: 8.8 MG/DL (ref 8.5–10.1)
CHLORIDE SERPL-SCNC: 116 MMOL/L (ref 97–108)
CO2 SERPL-SCNC: 19 MMOL/L (ref 21–32)
COMMENT, HOLDF: NORMAL
COVID-19 RAPID TEST, COVR: DETECTED
CREAT SERPL-MCNC: 3.14 MG/DL (ref 0.55–1.02)
DIFFERENTIAL METHOD BLD: ABNORMAL
EOSINOPHIL # BLD: 0 K/UL (ref 0–0.4)
EOSINOPHIL NFR BLD: 0 % (ref 0–7)
ERYTHROCYTE [DISTWIDTH] IN BLOOD BY AUTOMATED COUNT: 13.4 % (ref 11.5–14.5)
GLOBULIN SER CALC-MCNC: 3.1 G/DL (ref 2–4)
GLUCOSE SERPL-MCNC: 124 MG/DL (ref 65–100)
HCT VFR BLD AUTO: 39 % (ref 35–47)
HGB BLD-MCNC: 12.2 G/DL (ref 11.5–16)
IMM GRANULOCYTES # BLD AUTO: 0.1 K/UL (ref 0–0.04)
IMM GRANULOCYTES NFR BLD AUTO: 1 % (ref 0–0.5)
LYMPHOCYTES # BLD: 0.7 K/UL (ref 0.8–3.5)
LYMPHOCYTES NFR BLD: 10 % (ref 12–49)
MCH RBC QN AUTO: 28.3 PG (ref 26–34)
MCHC RBC AUTO-ENTMCNC: 31.3 G/DL (ref 30–36.5)
MCV RBC AUTO: 90.5 FL (ref 80–99)
MONOCYTES # BLD: 0.4 K/UL (ref 0–1)
MONOCYTES NFR BLD: 6 % (ref 5–13)
NEUTS SEG # BLD: 5.5 K/UL (ref 1.8–8)
NEUTS SEG NFR BLD: 83 % (ref 32–75)
NRBC # BLD: 0 K/UL (ref 0–0.01)
NRBC BLD-RTO: 0 PER 100 WBC
PLATELET # BLD AUTO: 180 K/UL (ref 150–400)
PLATELET COMMENTS,PCOM: ABNORMAL
PMV BLD AUTO: 10.5 FL (ref 8.9–12.9)
POTASSIUM SERPL-SCNC: 3.1 MMOL/L (ref 3.5–5.1)
PROT SERPL-MCNC: 6.3 G/DL (ref 6.4–8.2)
RBC # BLD AUTO: 4.31 M/UL (ref 3.8–5.2)
RBC MORPH BLD: ABNORMAL
SAMPLES BEING HELD,HOLD: NORMAL
SODIUM SERPL-SCNC: 142 MMOL/L (ref 136–145)
SOURCE, COVRS: ABNORMAL
WBC # BLD AUTO: 6.7 K/UL (ref 3.6–11)

## 2022-01-15 PROCEDURE — 71045 X-RAY EXAM CHEST 1 VIEW: CPT

## 2022-01-15 PROCEDURE — 93005 ELECTROCARDIOGRAM TRACING: CPT

## 2022-01-15 PROCEDURE — 96374 THER/PROPH/DIAG INJ IV PUSH: CPT

## 2022-01-15 PROCEDURE — 85025 COMPLETE CBC W/AUTO DIFF WBC: CPT

## 2022-01-15 PROCEDURE — 65270000029 HC RM PRIVATE

## 2022-01-15 PROCEDURE — 94761 N-INVAS EAR/PLS OXIMETRY MLT: CPT

## 2022-01-15 PROCEDURE — 87635 SARS-COV-2 COVID-19 AMP PRB: CPT

## 2022-01-15 PROCEDURE — 36415 COLL VENOUS BLD VENIPUNCTURE: CPT

## 2022-01-15 PROCEDURE — 74011250636 HC RX REV CODE- 250/636: Performed by: STUDENT IN AN ORGANIZED HEALTH CARE EDUCATION/TRAINING PROGRAM

## 2022-01-15 PROCEDURE — 99285 EMERGENCY DEPT VISIT HI MDM: CPT

## 2022-01-15 PROCEDURE — 74176 CT ABD & PELVIS W/O CONTRAST: CPT

## 2022-01-15 PROCEDURE — 65660000000 HC RM CCU STEPDOWN

## 2022-01-15 PROCEDURE — 96361 HYDRATE IV INFUSION ADD-ON: CPT

## 2022-01-15 PROCEDURE — 80053 COMPREHEN METABOLIC PANEL: CPT

## 2022-01-15 RX ORDER — DEXAMETHASONE SODIUM PHOSPHATE 10 MG/ML
6 INJECTION INTRAMUSCULAR; INTRAVENOUS ONCE
Status: COMPLETED | OUTPATIENT
Start: 2022-01-15 | End: 2022-01-15

## 2022-01-15 RX ORDER — SODIUM CHLORIDE 9 MG/ML
500 INJECTION, SOLUTION INTRAVENOUS ONCE
Status: COMPLETED | OUTPATIENT
Start: 2022-01-15 | End: 2022-01-16

## 2022-01-15 RX ADMIN — DEXAMETHASONE SODIUM PHOSPHATE 6 MG: 10 INJECTION INTRAMUSCULAR; INTRAVENOUS at 21:58

## 2022-01-15 RX ADMIN — SODIUM CHLORIDE 500 ML: 9 INJECTION, SOLUTION INTRAVENOUS at 21:51

## 2022-01-16 ENCOUNTER — APPOINTMENT (OUTPATIENT)
Dept: NON INVASIVE DIAGNOSTICS | Age: 87
DRG: 177 | End: 2022-01-16
Attending: INTERNAL MEDICINE
Payer: MEDICARE

## 2022-01-16 ENCOUNTER — APPOINTMENT (OUTPATIENT)
Dept: CT IMAGING | Age: 87
DRG: 177 | End: 2022-01-16
Attending: INTERNAL MEDICINE
Payer: MEDICARE

## 2022-01-16 LAB
ALBUMIN SERPL-MCNC: 3 G/DL (ref 3.5–5)
ALBUMIN/GLOB SERPL: 1 {RATIO} (ref 1.1–2.2)
ALP SERPL-CCNC: 64 U/L (ref 45–117)
ALT SERPL-CCNC: 34 U/L (ref 12–78)
ANION GAP SERPL CALC-SCNC: 11 MMOL/L (ref 5–15)
APAP SERPL-MCNC: <2 UG/ML (ref 10–30)
APPEARANCE UR: CLEAR
AST SERPL-CCNC: 39 U/L (ref 15–37)
BACTERIA URNS QL MICRO: NEGATIVE /HPF
BASOPHILS # BLD: 0 K/UL (ref 0–0.1)
BASOPHILS NFR BLD: 0 % (ref 0–1)
BILIRUB SERPL-MCNC: 0.3 MG/DL (ref 0.2–1)
BILIRUB UR QL: NEGATIVE
BNP SERPL-MCNC: 482 PG/ML
BUN SERPL-MCNC: 64 MG/DL (ref 6–20)
BUN/CREAT SERPL: 23 (ref 12–20)
CALCIUM SERPL-MCNC: 8.6 MG/DL (ref 8.5–10.1)
CHLORIDE SERPL-SCNC: 120 MMOL/L (ref 97–108)
CHLORIDE UR-SCNC: 13 MMOL/L
CO2 SERPL-SCNC: 13 MMOL/L (ref 21–32)
COLOR UR: ABNORMAL
COMMENT, HOLDF: NORMAL
CREAT SERPL-MCNC: 2.75 MG/DL (ref 0.55–1.02)
CREAT UR-MCNC: 105 MG/DL
CRP SERPL-MCNC: 1.75 MG/DL (ref 0–0.6)
D DIMER PPP FEU-MCNC: >35.2 MG/L FEU (ref 0–0.65)
DIFFERENTIAL METHOD BLD: ABNORMAL
EOSINOPHIL # BLD: 0 K/UL (ref 0–0.4)
EOSINOPHIL NFR BLD: 0 % (ref 0–7)
EPITH CASTS URNS QL MICRO: ABNORMAL /LPF
ERYTHROCYTE [DISTWIDTH] IN BLOOD BY AUTOMATED COUNT: 13.8 % (ref 11.5–14.5)
ETHANOL SERPL-MCNC: <10 MG/DL
FERRITIN SERPL-MCNC: 847 NG/ML (ref 26–388)
GLOBULIN SER CALC-MCNC: 3.1 G/DL (ref 2–4)
GLUCOSE SERPL-MCNC: 131 MG/DL (ref 65–100)
GLUCOSE UR STRIP.AUTO-MCNC: NEGATIVE MG/DL
HCT VFR BLD AUTO: 41.2 % (ref 35–47)
HGB BLD-MCNC: 12.2 G/DL (ref 11.5–16)
HGB UR QL STRIP: ABNORMAL
IMM GRANULOCYTES # BLD AUTO: 0.1 K/UL (ref 0–0.04)
IMM GRANULOCYTES NFR BLD AUTO: 2 % (ref 0–0.5)
KETONES UR QL STRIP.AUTO: NEGATIVE MG/DL
LDH SERPL L TO P-CCNC: 705 U/L (ref 81–246)
LEUKOCYTE ESTERASE UR QL STRIP.AUTO: ABNORMAL
LYMPHOCYTES # BLD: 0.4 K/UL (ref 0.8–3.5)
LYMPHOCYTES NFR BLD: 7 % (ref 12–49)
MAGNESIUM SERPL-MCNC: 2.4 MG/DL (ref 1.6–2.4)
MCH RBC QN AUTO: 28.9 PG (ref 26–34)
MCHC RBC AUTO-ENTMCNC: 29.6 G/DL (ref 30–36.5)
MCV RBC AUTO: 97.6 FL (ref 80–99)
MONOCYTES # BLD: 0.1 K/UL (ref 0–1)
MONOCYTES NFR BLD: 2 % (ref 5–13)
NEUTS SEG # BLD: 5.4 K/UL (ref 1.8–8)
NEUTS SEG NFR BLD: 89 % (ref 32–75)
NITRITE UR QL STRIP.AUTO: POSITIVE
NRBC # BLD: 0 K/UL (ref 0–0.01)
NRBC BLD-RTO: 0 PER 100 WBC
OSMOLALITY UR: 366 MOSM/KG H2O
PH UR STRIP: 5.5 [PH] (ref 5–8)
PHOSPHATE SERPL-MCNC: 3.3 MG/DL (ref 2.6–4.7)
PLATELET # BLD AUTO: 173 K/UL (ref 150–400)
PMV BLD AUTO: 10.6 FL (ref 8.9–12.9)
POTASSIUM SERPL-SCNC: 3.8 MMOL/L (ref 3.5–5.1)
PROT SERPL-MCNC: 6.1 G/DL (ref 6.4–8.2)
PROT UR STRIP-MCNC: 100 MG/DL
RBC # BLD AUTO: 4.22 M/UL (ref 3.8–5.2)
RBC #/AREA URNS HPF: ABNORMAL /HPF (ref 0–5)
RBC MORPH BLD: ABNORMAL
SALICYLATES SERPL-MCNC: <1.7 MG/DL (ref 2.8–20)
SAMPLES BEING HELD,HOLD: NORMAL
SODIUM SERPL-SCNC: 144 MMOL/L (ref 136–145)
SODIUM UR-SCNC: 23 MMOL/L
SP GR UR REFRACTOMETRY: 1.01 (ref 1–1.03)
TROPONIN-HIGH SENSITIVITY: 62 NG/L (ref 0–51)
TSH SERPL DL<=0.05 MIU/L-ACNC: 0.43 UIU/ML (ref 0.36–3.74)
UROBILINOGEN UR QL STRIP.AUTO: 0.2 EU/DL (ref 0.2–1)
WBC # BLD AUTO: 6 K/UL (ref 3.6–11)
WBC URNS QL MICRO: ABNORMAL /HPF (ref 0–4)

## 2022-01-16 PROCEDURE — 86140 C-REACTIVE PROTEIN: CPT

## 2022-01-16 PROCEDURE — 82077 ASSAY SPEC XCP UR&BREATH IA: CPT

## 2022-01-16 PROCEDURE — 83615 LACTATE (LD) (LDH) ENZYME: CPT

## 2022-01-16 PROCEDURE — 99223 1ST HOSP IP/OBS HIGH 75: CPT | Performed by: INTERNAL MEDICINE

## 2022-01-16 PROCEDURE — 74011250637 HC RX REV CODE- 250/637: Performed by: INTERNAL MEDICINE

## 2022-01-16 PROCEDURE — 82436 ASSAY OF URINE CHLORIDE: CPT

## 2022-01-16 PROCEDURE — 84100 ASSAY OF PHOSPHORUS: CPT

## 2022-01-16 PROCEDURE — 83735 ASSAY OF MAGNESIUM: CPT

## 2022-01-16 PROCEDURE — 82570 ASSAY OF URINE CREATININE: CPT

## 2022-01-16 PROCEDURE — 84300 ASSAY OF URINE SODIUM: CPT

## 2022-01-16 PROCEDURE — 83935 ASSAY OF URINE OSMOLALITY: CPT

## 2022-01-16 PROCEDURE — 85379 FIBRIN DEGRADATION QUANT: CPT

## 2022-01-16 PROCEDURE — 77010033678 HC OXYGEN DAILY

## 2022-01-16 PROCEDURE — 74011000250 HC RX REV CODE- 250: Performed by: INTERNAL MEDICINE

## 2022-01-16 PROCEDURE — 81001 URINALYSIS AUTO W/SCOPE: CPT

## 2022-01-16 PROCEDURE — 36415 COLL VENOUS BLD VENIPUNCTURE: CPT

## 2022-01-16 PROCEDURE — 82728 ASSAY OF FERRITIN: CPT

## 2022-01-16 PROCEDURE — 85025 COMPLETE CBC W/AUTO DIFF WBC: CPT

## 2022-01-16 PROCEDURE — 80143 DRUG ASSAY ACETAMINOPHEN: CPT

## 2022-01-16 PROCEDURE — 94760 N-INVAS EAR/PLS OXIMETRY 1: CPT

## 2022-01-16 PROCEDURE — 74011250636 HC RX REV CODE- 250/636: Performed by: INTERNAL MEDICINE

## 2022-01-16 PROCEDURE — 70450 CT HEAD/BRAIN W/O DYE: CPT

## 2022-01-16 PROCEDURE — 80053 COMPREHEN METABOLIC PANEL: CPT

## 2022-01-16 PROCEDURE — 83880 ASSAY OF NATRIURETIC PEPTIDE: CPT

## 2022-01-16 PROCEDURE — 80179 DRUG ASSAY SALICYLATE: CPT

## 2022-01-16 PROCEDURE — 65270000029 HC RM PRIVATE

## 2022-01-16 PROCEDURE — 84484 ASSAY OF TROPONIN QUANT: CPT

## 2022-01-16 PROCEDURE — 84443 ASSAY THYROID STIM HORMONE: CPT

## 2022-01-16 RX ORDER — GUAIFENESIN 100 MG/5ML
81 LIQUID (ML) ORAL DAILY
Status: DISCONTINUED | OUTPATIENT
Start: 2022-01-16 | End: 2022-01-18 | Stop reason: HOSPADM

## 2022-01-16 RX ORDER — SERTRALINE HYDROCHLORIDE 50 MG/1
25 TABLET, FILM COATED ORAL
Status: DISCONTINUED | OUTPATIENT
Start: 2022-01-16 | End: 2022-01-18 | Stop reason: HOSPADM

## 2022-01-16 RX ORDER — ACETAMINOPHEN 325 MG/1
650 TABLET ORAL
Status: DISCONTINUED | OUTPATIENT
Start: 2022-01-16 | End: 2022-01-18 | Stop reason: HOSPADM

## 2022-01-16 RX ORDER — SODIUM CHLORIDE, SODIUM LACTATE, POTASSIUM CHLORIDE, CALCIUM CHLORIDE 600; 310; 30; 20 MG/100ML; MG/100ML; MG/100ML; MG/100ML
75 INJECTION, SOLUTION INTRAVENOUS CONTINUOUS
Status: DISCONTINUED | OUTPATIENT
Start: 2022-01-16 | End: 2022-01-16

## 2022-01-16 RX ORDER — SODIUM CHLORIDE 0.9 % (FLUSH) 0.9 %
5-40 SYRINGE (ML) INJECTION EVERY 8 HOURS
Status: DISCONTINUED | OUTPATIENT
Start: 2022-01-16 | End: 2022-01-18 | Stop reason: HOSPADM

## 2022-01-16 RX ORDER — GUAIFENESIN/DEXTROMETHORPHAN 100-10MG/5
5 SYRUP ORAL
Status: DISCONTINUED | OUTPATIENT
Start: 2022-01-16 | End: 2022-01-18 | Stop reason: HOSPADM

## 2022-01-16 RX ORDER — ACETAMINOPHEN 650 MG/1
650 SUPPOSITORY RECTAL
Status: DISCONTINUED | OUTPATIENT
Start: 2022-01-16 | End: 2022-01-18 | Stop reason: HOSPADM

## 2022-01-16 RX ORDER — ONDANSETRON 2 MG/ML
4 INJECTION INTRAMUSCULAR; INTRAVENOUS
Status: DISCONTINUED | OUTPATIENT
Start: 2022-01-16 | End: 2022-01-18 | Stop reason: HOSPADM

## 2022-01-16 RX ORDER — HEPARIN SODIUM 5000 [USP'U]/ML
5000 INJECTION, SOLUTION INTRAVENOUS; SUBCUTANEOUS EVERY 8 HOURS
Status: DISCONTINUED | OUTPATIENT
Start: 2022-01-16 | End: 2022-01-18 | Stop reason: HOSPADM

## 2022-01-16 RX ORDER — MEGESTROL ACETATE 40 MG/ML
20 SUSPENSION ORAL DAILY
Status: DISCONTINUED | OUTPATIENT
Start: 2022-01-16 | End: 2022-01-18 | Stop reason: HOSPADM

## 2022-01-16 RX ORDER — LOSARTAN POTASSIUM 25 MG/1
25 TABLET ORAL DAILY
Status: DISCONTINUED | OUTPATIENT
Start: 2022-01-16 | End: 2022-01-18 | Stop reason: HOSPADM

## 2022-01-16 RX ORDER — POTASSIUM CHLORIDE 750 MG/1
40 TABLET, FILM COATED, EXTENDED RELEASE ORAL
Status: COMPLETED | OUTPATIENT
Start: 2022-01-16 | End: 2022-01-16

## 2022-01-16 RX ORDER — SODIUM CHLORIDE 0.9 % (FLUSH) 0.9 %
5-40 SYRINGE (ML) INJECTION AS NEEDED
Status: DISCONTINUED | OUTPATIENT
Start: 2022-01-16 | End: 2022-01-18 | Stop reason: HOSPADM

## 2022-01-16 RX ORDER — ONDANSETRON 4 MG/1
4 TABLET, ORALLY DISINTEGRATING ORAL
Status: DISCONTINUED | OUTPATIENT
Start: 2022-01-16 | End: 2022-01-18 | Stop reason: HOSPADM

## 2022-01-16 RX ORDER — POLYETHYLENE GLYCOL 3350 17 G/17G
17 POWDER, FOR SOLUTION ORAL DAILY PRN
Status: DISCONTINUED | OUTPATIENT
Start: 2022-01-16 | End: 2022-01-18 | Stop reason: HOSPADM

## 2022-01-16 RX ORDER — PANTOPRAZOLE SODIUM 40 MG/1
40 TABLET, DELAYED RELEASE ORAL DAILY
Status: DISCONTINUED | OUTPATIENT
Start: 2022-01-16 | End: 2022-01-18 | Stop reason: HOSPADM

## 2022-01-16 RX ORDER — DEXAMETHASONE SODIUM PHOSPHATE 4 MG/ML
6 INJECTION, SOLUTION INTRA-ARTICULAR; INTRALESIONAL; INTRAMUSCULAR; INTRAVENOUS; SOFT TISSUE EVERY 24 HOURS
Status: DISCONTINUED | OUTPATIENT
Start: 2022-01-16 | End: 2022-01-18 | Stop reason: HOSPADM

## 2022-01-16 RX ADMIN — ACETAMINOPHEN 650 MG: 325 TABLET ORAL at 10:25

## 2022-01-16 RX ADMIN — SODIUM BICARBONATE: 84 INJECTION, SOLUTION INTRAVENOUS at 15:12

## 2022-01-16 RX ADMIN — DEXAMETHASONE SODIUM PHOSPHATE 6 MG: 4 INJECTION, SOLUTION INTRA-ARTICULAR; INTRALESIONAL; INTRAMUSCULAR; INTRAVENOUS; SOFT TISSUE at 05:27

## 2022-01-16 RX ADMIN — SERTRALINE 25 MG: 50 TABLET, FILM COATED ORAL at 20:40

## 2022-01-16 RX ADMIN — SODIUM CHLORIDE, PRESERVATIVE FREE 20 MG: 5 INJECTION INTRAVENOUS at 20:39

## 2022-01-16 RX ADMIN — SODIUM CHLORIDE, PRESERVATIVE FREE 5 ML: 5 INJECTION INTRAVENOUS at 22:00

## 2022-01-16 RX ADMIN — SODIUM CHLORIDE, PRESERVATIVE FREE 10 ML: 5 INJECTION INTRAVENOUS at 10:25

## 2022-01-16 RX ADMIN — LOSARTAN POTASSIUM 25 MG: 25 TABLET, FILM COATED ORAL at 10:25

## 2022-01-16 RX ADMIN — SODIUM CHLORIDE, PRESERVATIVE FREE 10 ML: 5 INJECTION INTRAVENOUS at 15:11

## 2022-01-16 RX ADMIN — SODIUM CHLORIDE, PRESERVATIVE FREE 20 MG: 5 INJECTION INTRAVENOUS at 10:25

## 2022-01-16 RX ADMIN — SODIUM CHLORIDE, POTASSIUM CHLORIDE, SODIUM LACTATE AND CALCIUM CHLORIDE 75 ML/HR: 600; 310; 30; 20 INJECTION, SOLUTION INTRAVENOUS at 05:27

## 2022-01-16 RX ADMIN — POTASSIUM CHLORIDE 40 MEQ: 750 TABLET, FILM COATED, EXTENDED RELEASE ORAL at 05:26

## 2022-01-16 RX ADMIN — HEPARIN SODIUM 5000 UNITS: 5000 INJECTION, SOLUTION INTRAVENOUS; SUBCUTANEOUS at 20:40

## 2022-01-16 RX ADMIN — HEPARIN SODIUM 5000 UNITS: 5000 INJECTION, SOLUTION INTRAVENOUS; SUBCUTANEOUS at 05:27

## 2022-01-16 RX ADMIN — HEPARIN SODIUM 5000 UNITS: 5000 INJECTION, SOLUTION INTRAVENOUS; SUBCUTANEOUS at 15:11

## 2022-01-16 RX ADMIN — ASPIRIN 81 MG CHEWABLE TABLET 81 MG: 81 TABLET CHEWABLE at 10:25

## 2022-01-16 NOTE — ED NOTES
Pt unable to urinate after coaching from RN. Felix inserted using sterile technique without complication. Bladder immediately began draining after catheter insertion.

## 2022-01-16 NOTE — PROGRESS NOTES
[address] [facility]  Hospitalist Group                                                                                          Hospitalist Progress Note  Lico Hodges MD  Answering service: [phone] or [phone] from in house phone        Date of Service:  2022  NAME:  China Arias  :  1933  MRN:  [id number]      Admission Summary:   HPI: \"This is an 71-year-old woman with a past medical history significant for hypertension, anxiety/depression, chronic kidney disease, who was in her usual state of health until the day of her presentation at the emergency room when it was reported that the patient developed change in mental status. The patient also has generalized weakness according to report from family members as well as fatigue, poor appetite. EMS was called and when the EMS arrived at the scene, the patient's oxygen saturation was noted to be 70%. She was brought to the emergency room for further evaluation. The patient was recently admitted to the hospital from 2022 to 2022. The patient was admitted and treated for diverticulitis. When the patient arrived at the emergency room, chest x-ray shows bilateral lower lobe and mild interstitial pneumonia. The patient tested positive for COVID-19 virus infection. She was then referred to the hospitalist service for evaluation for admission. No history of fever, rigors, or chills. The patient is not able to provide history because of suspected underlying memory impairment. \"    Interval history / Subjective:   Patient seen examined, altered. Assessment & Plan:     Acute delirium on demenita  -CT head, UA  neg   -spoke to POA patient has baseline dementia that has been worsening, recently hospitalized and after discharge was refusing PO. Acute respiratory failure with hypoxia.  COVID +   -didmer elevated, spoke to daughter POA agreed to no escalation of care would like comfort measures and agreed to speak to hospice. - continue with supplemental oxygen, decadron   -will hold off on further invasive studies at this time     Hyperglycemia - improved no hx of dm      Hypokalemia. We will replace potassium and repeat potassium level. Acute-on-chronic kidney disease stage V  AGMA. -nephro following recs appreciated     6. Anxiety/depression. We will continue with home medication. I spoke to daughter POA over the phone, patient is DNR/DNI wishes to pursue comfort measures at this point in time and interested in speaking to hospice care. Patient has baseline dementia, was having poor po intake prior to admission. Not candidate for feeding tube. Hospice consult placed comfort measures placed will avoid invasive studies at this time which was discussed with daughter, she agreed. Code status: DNR  DVT prophylaxis: heparin gtt     Care Plan discussed with: Patient/Family and Nurse  Anticipated Disposition: TBD  Anticipated Discharge: 24 hours to 50 hours     Hospital Problems  Date Reviewed: 1/16/2022          Codes Class Noted POA    * (Principal) Acute delirium ICD-10-CM: R41.0  ICD-9-CM: 780.09  1/15/2022 Yes                Review of Systems:   A comprehensive review of systems was negative except for that written in the HPI. Vital Signs:    Last 24hrs VS reviewed since prior progress note. Most recent are:  Visit Vitals  /75   Pulse 88   Temp 97.5 °F (36.4 °C)   Resp 17   Wt 50.2 kg (110 lb 10.7 oz)   SpO2 95%   BMI 23.94 kg/m²       No intake or output data in the 24 hours ending 01/16/22 1420     Physical Examination:     I had a face to face encounter with this patient and independently examined them on 1/16/2022 as outlined below:          Constitutional:  No acute distress, cooperative, pleasant    ENT:  Oral mucosa moist, oropharynx benign. Resp:  CTA bilaterally. No wheezing/rhonchi/rales.  No accessory muscle use   CV:  Regular rhythm, normal rate, no murmurs, gallops, rubs    GI:  Soft, non distended, non tender. normoactive bowel sounds, no hepatosplenomegaly     Musculoskeletal:  No edema, warm, 2+ pulses throughout    Neurologic:  Moves all extremities. AAOx3, CN II-XII reviewed            Data Review:    Review and/or order of clinical lab test  Review and/or order of tests in the radiology section of [facility]  Review and/or order of tests in the medicine section of [facility]      Labs:     Recent Labs     01/16/22  0254 01/15/22  2023   WBC 6.0 6.7   HGB 12.2 12.2   HCT 41.2 39.0    180     Recent Labs     01/16/22  0254 01/15/22  2023    142   K 3.8 3.1*   * 116*   CO2 13* 19*   BUN 64* 66*   CREA 2.75* 3.14*   * 124*   CA 8.6 8.8   MG 2.4  --    PHOS 3.3  --      Recent Labs     01/16/22  0254 01/15/22  2023   ALT 34 35   AP 64 66   TBILI 0.3 0.3   TP 6.1* 6.3*   ALB 3.0* 3.2*   GLOB 3.1 3.1     No results for input(s): INR, PTP, APTT, INREXT in the last 72 hours. Recent Labs     01/16/22  1047   FERR 847*      No results found for: FOL, RBCF   No results for input(s): PH, PCO2, PO2 in the last 72 hours. No results for input(s): CPK, CKNDX, TROIQ in the last 72 hours.     No lab exists for component: CPKMB  Lab Results   Component Value Date/Time    Cholesterol, total 199 03/05/2020 10:57 AM    HDL Cholesterol 57 03/05/2020 10:57 AM    LDL, calculated 116.6 (H) 03/05/2020 10:57 AM    Triglyceride 127 03/05/2020 10:57 AM    CHOL/HDL Ratio 3.5 03/05/2020 10:57 AM     No results found for: [facility]  Lab Results   Component Value Date/Time    Color YELLOW/STRAW 01/16/2022 07:13 AM    Appearance CLEAR 01/16/2022 07:13 AM    Specific gravity 1.015 01/16/2022 07:13 AM    Specific gravity 1.015 01/09/2022 02:37 PM    pH (UA) 5.5 01/16/2022 07:13 AM    Protein 100 (A) 01/16/2022 07:13 AM    Glucose Negative 01/16/2022 07:13 AM    Ketone Negative 01/16/2022 07:13 AM    Bilirubin Negative 01/16/2022 07:13 AM    Urobilinogen 0.2 01/16/2022 07:13 AM    Nitrites Positive (A) 01/16/2022 07:13 AM    Leukocyte Esterase SMALL (A) 01/16/2022 07:13 AM    Epithelial cells FEW 01/16/2022 07:13 AM    Bacteria Negative 01/16/2022 07:13 AM    WBC 0-4 01/16/2022 07:13 AM    RBC 0-5 01/16/2022 07:13 AM         Medications Reviewed:     Current Facility-Administered Medications   Medication Dose Route Frequency    aspirin chewable tablet 81 mg  81 mg Oral DAILY    losartan (COZAAR) tablet 25 mg  25 mg Oral DAILY    megestroL (MEGACE) tablet 20 mg  20 mg Oral DAILY    sertraline (ZOLOFT) tablet 25 mg  25 mg Oral QHS    pantoprazole (PROTONIX) tablet 40 mg  40 mg Oral DAILY    sodium chloride (NS) flush 5-40 mL  5-40 mL IntraVENous Q8H    sodium chloride (NS) flush 5-40 mL  5-40 mL IntraVENous PRN    acetaminophen (TYLENOL) tablet 650 mg  650 mg Oral Q6H PRN    Or    acetaminophen (TYLENOL) suppository 650 mg  650 mg Rectal Q6H PRN    polyethylene glycol (MIRALAX) packet 17 g  17 g Oral DAILY PRN    ondansetron (ZOFRAN ODT) tablet 4 mg  4 mg Oral Q8H PRN    Or    ondansetron (ZOFRAN) injection 4 mg  4 mg IntraVENous Q6H PRN    heparin (porcine) injection 5,000 Units  5,000 Units SubCUTAneous Q8H    dexamethasone (DECADRON) 4 mg/mL injection 6 mg  6 mg IntraVENous Q24H    guaiFENesin-dextromethorphan (ROBITUSSIN DM) 100-10 mg/5 mL syrup 5 mL  5 mL Oral Q4H PRN    famotidine (PF) (PEPCID) 20 mg in sodium chloride (NS) 10 mL injection  20 mg IntraVENous Q12H    sodium bicarbonate (8.4%) 75 mEq in 0.45% sodium chloride 1,000 mL infusion   IntraVENous CONTINUOUS     Current Outpatient Medications   Medication Sig    megestroL (MEGACE) 20 mg tablet Take 1 Tablet by mouth daily.  metoprolol succinate (Toprol XL) 50 mg XL tablet Take 50 mg by mouth daily. (Patient not taking: Reported on 1/13/2022)    sertraline (ZOLOFT) 25 mg tablet Take 25 mg by mouth nightly.  12.5 mg    OTHER,NON-FORMULARY, Blood sugar management herbal supplement one pill daily    pantoprazole (PROTONIX) 40 mg tablet TAKE 1 TABLET EVERY DAY    mycophenolate mofetil (CELLCEPT) 250 mg capsule Take 750 mg by mouth two (2) times a day.  aspirin 81 mg chewable tablet Take 81 mg by mouth daily.  losartan (COZAAR) 25 mg tablet Take 25 mg by mouth daily.      ______________________________________________________________________  EXPECTED LENGTH OF STAY: - - -  ACTUAL LENGTH OF STAY:          1                 Kiana Guerra MD

## 2022-01-16 NOTE — HOSPICE
[facility]  [phone]     Patient Name: Herve Gross  YOB: 1933  Age: 80 y.o. [facility] RN Note:  Hospice consult received, reviewing chart. Will follow up with Unit Nurse and Care Manager to discuss plan of care, patient status and discharge disposition within the hour. Thank you for the opportunity to be of service to this patient.     Yolande Mcardle, RN, [place] [address] Nurse Liaison  [phone] Mobile  [phone] Office  Available on [employer]

## 2022-01-16 NOTE — PROGRESS NOTES
ATSP re: CKD/CODIE. Review of chart shows that the patient follows with [facility]. I've asked her nurse to contact that group.

## 2022-01-16 NOTE — ED NOTES
Pt has not produced urine since this RN took over care. Bladder scanned pt and scanner noted 633cc's urine in pt's bladder. Asked pt if she felt like she needed to urinate and she stated that she did. This RN encouraged pt to urinate since she has purewick in place. Pt indicated she understood. Will observe for potential urinary retention.

## 2022-01-16 NOTE — CONSULTS
Cardiology Hospital Consultation Note   REFERRING PROVIDER: Dr Sandra Calero  Subjective:      Morro Hoskins is a 80 y.o. patient who is seen for evaluation of + troponin  She has COVID + pneumonia/respiratory failure/hypoxemia and delirium/mental status change, diverticulosis  Hs troponin very mildly elevated: 62  ECG sinus tach RBBB        Chest xray Bilateral lower lobe mild interstitial pneumonia    Head CT negative    Patient Active Problem List   Diagnosis Code    Acute diverticulitis K57.92    CKD (chronic kidney disease) stage 4, GFR 15-29 ml/min ([facility]) N18.4    HTN (hypertension), benign I10    GERD (gastroesophageal reflux disease) K21.9    Anxiety F41.9    MCI (mild cognitive impairment) G31.84    Sinus tachycardia R00.0    Weakness R53.1    Nausea & vomiting R11.2    Pneumonia due to COVID-19 virus U07.1, J12.82    Acute delirium R41.0     Current Facility-Administered Medications   Medication Dose Route Frequency Provider Last Rate Last Admin    aspirin chewable tablet 81 mg  81 mg Oral DAILY Alexia Alva MD   81 mg at 01/16/22 1025    losartan (COZAAR) tablet 25 mg  25 mg Oral DAILY Alexia Alva MD   25 mg at 01/16/22 1025    megestroL (MEGACE) tablet 20 mg  20 mg Oral DAILY Alexia Alva MD        sertraline (ZOLOFT) tablet 25 mg  25 mg Oral QHS Alexia Alva MD        pantoprazole (PROTONIX) tablet 40 mg  40 mg Oral DAILY Alexia Alva MD        sodium chloride (NS) flush 5-40 mL  5-40 mL IntraVENous Q8H Alexia Alva MD   10 mL at 01/16/22 1025    sodium chloride (NS) flush 5-40 mL  5-40 mL IntraVENous PRN Alexia Alva MD        acetaminophen (TYLENOL) tablet 650 mg  650 mg Oral Q6H PRN Alexia Alva MD   650 mg at 01/16/22 1025    Or    acetaminophen (TYLENOL) suppository 650 mg  650 mg Rectal Q6H PRN Alexia Alva MD        polyethylene glycol (MIRALAX) packet 17 g  17 g Oral DAILY PRN Alexia Alva MD        ondansetron (ZOFRAN ODT) tablet 4 mg  4 mg Oral Q8H PRN Alexia Alva MD        Or    ondansetron (ZOFRAN) injection 4 mg  4 mg IntraVENous Q6H PRN Alexia Alva MD        heparin (porcine) injection 5,000 Units  5,000 Units SubCUTAneous Q8H Alexia Alva MD   5,000 Units at 01/16/22 0527    lactated Ringers infusion  75 mL/hr IntraVENous CONTINUOUS Alexia Alva MD 75 mL/hr at 01/16/22 0527 75 mL/hr at 01/16/22 0527    dexamethasone (DECADRON) 4 mg/mL injection 6 mg  6 mg IntraVENous Q24H Alexia Alva MD   6 mg at 01/16/22 0527    guaiFENesin-dextromethorphan (ROBITUSSIN DM) 100-10 mg/5 mL syrup 5 mL  5 mL Oral Q4H PRN Alexia Alva MD        famotidine (PF) (PEPCID) 20 mg in sodium chloride (NS) 10 mL injection  20 mg IntraVENous Q12H Alexia Alva MD   20 mg at 01/16/22 1025     Current Outpatient Medications   Medication Sig Dispense Refill    megestroL (MEGACE) 20 mg tablet Take 1 Tablet by mouth daily. 30 Tablet 1    metoprolol succinate (Toprol XL) 50 mg XL tablet Take 50 mg by mouth daily. (Patient not taking: Reported on 1/13/2022)      sertraline (ZOLOFT) 25 mg tablet Take 25 mg by mouth nightly. 12.5 mg      OTHER,NON-FORMULARY, Blood sugar management herbal supplement one pill daily      pantoprazole (PROTONIX) 40 mg tablet TAKE 1 TABLET EVERY DAY 90 Tablet 0    mycophenolate mofetil (CELLCEPT) 250 mg capsule Take 750 mg by mouth two (2) times a day.  aspirin 81 mg chewable tablet Take 81 mg by mouth daily.  losartan (COZAAR) 25 mg tablet Take 25 mg by mouth daily.   1     Allergies   Allergen Reactions    Sulfa (Sulfonamide Antibiotics) Swelling and Angioedema    Penicillins Swelling     Past Medical History:   Diagnosis Date    Chronic renal failure     dx'd 2/2013    Diverticulitis     Hyperlipidemia     Hypertension     Peptic ulcer disease     Transient ischemic attack     2003    Urinary tract infection     recurrent      Past Surgical History:   Procedure Laterality Date    HX BREAST BIOPSY      Bengin    IR PTA PERIPHERAL ARTERY  10/8/2020     No family history on file or obtainable  Social History     Tobacco Use    Smoking status: Never Smoker    Smokeless tobacco: Never Used   Substance Use Topics    Alcohol use: No        Review of Systems: unable to obtain, COVID pneumonia and delirium     Objective:     Visit Vitals  /88   Pulse 87   Temp 97.5 °F (36.4 °C)   Resp 18   Wt 110 lb 10.7 oz (50.2 kg)   SpO2 94%   BMI 23.94 kg/m²      Physical Exam:  In isolation due to COVID pneumonia    BMP:   Lab Results   Component Value Date/Time     01/16/2022 02:54 AM    K 3.8 01/16/2022 02:54 AM     (H) 01/16/2022 02:54 AM    CO2 13 (LL) 01/16/2022 02:54 AM    AGAP 11 01/16/2022 02:54 AM     (H) 01/16/2022 02:54 AM    BUN 64 (H) 01/16/2022 02:54 AM    CREA 2.75 (H) 01/16/2022 02:54 AM    GFRAA 20 (L) 01/16/2022 02:54 AM    GFRNA 16 (L) 01/16/2022 02:54 AM     CBC:   Lab Results   Component Value Date/Time    WBC 6.0 01/16/2022 02:54 AM    HGB 12.2 01/16/2022 02:54 AM    HCT 41.2 01/16/2022 02:54 AM     01/16/2022 02:54 AM        Assessment/Plan:   Hx troponin elevation   Hypertension  Mental status change/delirium  COVID pneumonia with hypoxemia. Covid + since 1/9/22 (was admitted from 1/9 to 1/11/2022)  Chronic renal failure  Diverticulosis without diverticulitis on CT-was treated for diverticulitis during admission 1/9-1/11  Small pericardial effusion on abdominal CT 1/15      Will recommend to treat COVID pneumonia/hypoxemia first and then later obtain 2 D echo when it is safe to do so  Cannot yet classify this as NSTEMI yet.   Very mild troponin elevation in the presence of severe chronic renal failure and severe hypoxemia/pneumonia from COVID is more likely supply demand mismatch  With her mental status change, she may not be able to take po meds  D dimer was high so agree with ruling out pulmonary embolism, DVT    Will ask Dr Elizabeth Thompson to keep an eye on her status    Thank you for involving me in this patient's care and please call with further concerns or questions. Raisa Oh M.D.   Electrophysiology/Cardiology  [facility]  [address] [place] [address]                                [phone]

## 2022-01-16 NOTE — ED NOTES
Bedside and Verbal shift change report given to Giacomo Reddy RN (oncoming nurse) by Kathie Block RN (offgoing nurse). Report included the following information SBAR, Kardex, ED Summary, Intake/Output, MAR, Recent Results and Med Rec Status.

## 2022-01-16 NOTE — H&P
[address]  HISTORY AND PHYSICAL    Name:  Eric Choi  MR#:  [id number]  :  1933  ACCOUNT #:  [de-identified]  ADMIT DATE:  01/15/2022      The patient was seen, evaluated, and admitted by me on 01/15/2022. PRIMARY CARE PHYSICIAN:  Betito Regalado MD    SOURCE OF INFORMATION:  The patient who is not a good historian because of her change in mental status, review of ED, and old electronic medical record. CHIEF COMPLAINT:  Change in mental status. HISTORY OF PRESENT ILLNESS:  This is an 68-year-old woman with a past medical history significant for hypertension, anxiety/depression, chronic kidney disease, who was in her usual state of health until the day of her presentation at the emergency room when it was reported that the patient developed change in mental status. The patient also has generalized weakness according to report from family members as well as fatigue, poor appetite. EMS was called and when the EMS arrived at the scene, the patient's oxygen saturation was noted to be 70%. She was brought to the emergency room for further evaluation. The patient was recently admitted to the hospital from 2022 to 2022. The patient was admitted and treated for diverticulitis. When the patient arrived at the emergency room, chest x-ray shows bilateral lower lobe and mild interstitial pneumonia. The patient tested positive for COVID-19 virus infection. She was then referred to the hospitalist service for evaluation for admission. No history of fever, rigors, or chills. The patient is not able to provide history because of suspected underlying memory impairment. PAST MEDICAL HISTORY:  Hypertension, anxiety/depression, chronic kidney disease. ALLERGIES:  THE PATIENT IS ALLERGIC TO SULFA.     MEDICATIONS:  Aspirin 81 mg daily, Cipro 500 mg twice daily, losartan 25 mg daily, Megace 200 mg daily, Toprol XL 50 mg daily, Flagyl 500 mg 3 times daily, CellCept 750 mg twice daily, Protonix 40 mg daily, Zoloft 25 mg daily. FAMILY HISTORY:  Unable to obtain because of the patient's change in mental status and dementia. PAST SURGICAL HISTORY:  This is reported as benign breast biopsy and stent placement in the lower extremity. SOCIAL HISTORY:  No history of alcohol or tobacco abuse. REVIEW OF SYSTEMS:  Unable to obtain because of the patient's change in mental status. PHYSICAL EXAMINATION:  GENERAL APPEARANCE:  The patient appeared ill, in moderate distress. VITAL SIGNS:  On arrival at the emergency room, temperature 98.4, pulse 118, respiratory rate of 20, blood pressure 115/74, oxygen saturation 88% on room air. HEENT:  Head:  Normocephalic, atraumatic. Eyes:  Normal eye movement. No redness, no drainage, no discharge. Ears:  Normal external ears with no evidence of drainage. Nose:  No deformity and no drainage. Mouth and Throat:  No visible oral lesion. Dry oral mucosa. NECK:  Neck is supple. No JVD, no thyromegaly. CHEST:  Few expiratory wheezing. No crackles. HEART:  Normal S1 and S2, regular. No clinically appreciable murmur. ABDOMEN:  Soft and nontender. Normal bowel sounds. CNS:  Alert and oriented to place. No gross focal neurological deficit. EXTREMITIES:  No edema. Pulses 2+ bilaterally. MUSCULOSKELETAL SYSTEM:  No evidence of joint deformity and swelling. SKIN:  No active skin lesions seen in the exposed part of the body. PSYCHIATRY:  Unable to assess mood and affect. LYMPHATIC SYSTEM:  No cervical lymphadenopathy. DIAGNOSTIC DATA:  EKG shows sinus tachycardia, right bundle-branch block. Chest x-ray shows bilateral lower lobe mild interstitial pneumonia. CT abdomen and pelvis without contrast shows diverticulosis without diverticulitis, no acute abdominal or pelvic process seen, small pericardial effusion. LABORATORY DATA:  COVID-19 rapid test detected.   Chemistry:  Sodium 142, potassium 3.1, chloride 116, CO2 of 19, glucose 124, BUN 66, creatinine 3.14, calcium 8.8, total bilirubin 0.3, ALT 35, AST 38, alkaline phosphatase 66, total protein at 6.3, albumin level 3.2, globulin at 3.0. Hematology:  WBC 6.7, hemoglobin at 12.2, hematocrit 39.0, platelets 640. ASSESSMENT:  1. Acute delirium. 2.  Acute respiratory failure with hypoxia. 3.  COVID-19 virus infection. 4.  Hypertension. 5.  Hyperglycemia. 6.  Hypokalemia. 7.  Anxiety/depression. 8.  Acute-on-chronic kidney disease, stage V. PLAN:  1. Acute delirium. We will admit the patient for further evaluation and treatment. This is most likely due to metabolic event. We will identify and treat underlying etiological factors, which include acute respiratory failure with hypoxia. We will check CT scan of the head to rule out acute pathology. We will also check urinalysis to evaluate the patient for urinary tract infection as a possible cause of acute delirium. We will check TSH level. We will continue treatment of underlying etiological factors. The patient may require further evaluation including MRI of the brain and Neurology consult if the patient did not return to normal baseline mental status after treatment of the underlying metabolic etiological factors. 2.  Acute respiratory failure with hypoxia. This is contributing to the acute delirium. We will continue with supplemental oxygen. We will check BNP level. We will check cardiac markers to rule out acute myocardial infarction as a possible cause of acute respiratory failure with hypoxia. We will also check D-dimer to evaluate the patient for thromboembolism. We will continue with supplemental oxygen. 3.  COVID-19 virus infection. This is contributing to the patient's acute respiratory failure with hypoxia. We will start the patient on Decadron. The patient may benefit from the addition of interleukin inhibitor if indicated. 4.  Hyperglycemia. The patient has no history of diabetes.   Recheck hemoglobin A1c level. 5.  Hypokalemia. We will replace potassium and repeat potassium level. 6.  Anxiety/depression. We will continue with home medication. 7.  Acute-on-chronic kidney disease, stage V. We will carry out fluid therapy and monitor the patient's renal failure closely. OTHER ISSUES:  Code Status: The patient is a full code. We will place the patient on heparin for DVT prophylaxis. FUNCTIONAL STATUS PRIOR TO ADMISSION:  The patient came from assisted living facility. The patient is ambulatory with assistive device.         MD JACKLYN [email or web address]  D:  01/16/2022 4:03  T:  01/16/2022 5:50  JOB #:  [id number]  CC:  Franklyn Herrera MD

## 2022-01-16 NOTE — PROGRESS NOTES
Called about patient elevated D-dimer, elevated troponin level and BNP level. Will check ultrasound of the lower extremity for DVT, VQ scan for PE, echocardiogram and cardiology consult. Stat EKG for acute pathology.

## 2022-01-16 NOTE — ED TRIAGE NOTES
Pt from home. Family called ems today, according to family the pt has not been acting \"normal\" since her covid diagnosis on the 9th of January. She is A&Ox3, not oriented to situation. EMS report the pts o2 sat on ra on arrival was 70%. They placed her on 6L and she came up to 90s. Family are concerned that she has had a loss of appetite since being sick.

## 2022-01-16 NOTE — CONSULTS
[facility]   [address] [facility], [facility], [facility]  Phone: [phone] NOTE     Patient: Phyllis Burkitt MRN: [id number]  PCP: Misael Medina MD   :     1933  Age:   80 y.o. Sex:  female      Referring physician: Zakia Shukla MD  Reason for consultation: 80 y.o. female with Acute delirium [W25.6] complicated by CODIE   Admission Date: 1/15/2022  7:43 PM  LOS: 1 day      ASSESSMENT and PLAN :   1 CODIE/CKD 3 b   - codie in setting of Covid pnumonia   - suspect dehydration / --> ATN   - baseline cr 2, cr at 3 andf trending down   - will request urine studies  - change lr to bicarb drip      2 NAGMA   - will switch to bicarb drip       3 CKD 3b  - has chronic Tubulo interstitial nephritis  - sees Dr Angeline Newman  - was on Cellcept 750 BID   - currently on hold give covid pneumonia   - last cr was 2. ( baseline 1.5--1.7) in oct 2021         4 Covid Pneumonia  - On dexamethsone       5 HTN  - on losartan Low dose   - monitor closely given cr bump        6 Nep will follow   Care Plan discussed with:  pt     Thank you for consulting [facility] in the care of your patient. Subjective:   HPI: Phyllis Burkitt is a 80 y.o.  female who has been admitted to the hospital for decrease in mentation. She had been in [facility] in --22 for diverticulitis. She had a dc cr of 2 at that time. She had decreased mentation and so was brought here now. She was hypoxic at 70% oxygen. She has b/l interstitial penumonia  And  Tested + for covid. Nep was consulted for CODIE. Pt is poor historian, Pt seen in [place].      Past Medical Hx:   Past Medical History:   Diagnosis Date    Chronic renal failure     dx'd 2013    Diverticulitis     Hyperlipidemia     Hypertension     Peptic ulcer disease     Transient ischemic attack         Urinary tract infection     recurrent         Past Surgical Hx:     Past Surgical History:   Procedure Laterality Date    HX BREAST BIOPSY      Ma[place]dra    IR PTA PERIPHERAL ARTERY  10/8/2020         Allergies   Allergen Reactions    Sulfa (Sulfonamide Antibiotics) Swelling and Angioedema    Penicillins Swelling       Social Hx:  reports that she has never smoked. She has never used smokeless tobacco. She reports that she does not drink alcohol and does not use drugs. No family history on file. Review of Systems:  A thorough twelve point review of system was performed today. Pertinent positives and negatives are mentioned in the HPI. The reminder of the ROS is negative and noncontributory. Objective:    Vitals:    Vitals:    01/16/22 0700 01/16/22 0723 01/16/22 0959 01/16/22 1200   BP: 134/88  138/88 117/75   Pulse: 97 99 87 88   Resp: 22 21 18 17   Temp:   97.5 °F (36.4 °C)    SpO2: 94% 95% 94% 95%   Weight:         I&O's:  No intake/output data recorded. Visit Vitals  /75   Pulse 88   Temp 97.5 °F (36.4 °C)   Resp 17   Wt 50.2 kg (110 lb 10.7 oz)   SpO2 95%   BMI 23.94 kg/m²       Physical Exam:  General:  No apparent Distress  HEENT: PERRL,  No Pallor , No Icterus  Neck: Supple,no mass palpable  Lungs : CTA  CVS: RRR, S1 S2 normal, No murmur   Abdomen: Soft, NT, BS +  Extremities:  No Edema  Skin: No rash or lesions.   MS: No joint swelling, erythema, warmth  Neurologic: non focal, AAO x 2  Psych:  Unable to assess  Felix present   Laboratory Results:    Recent Labs     01/16/22  0254 01/15/22  2023    142   K 3.8 3.1*   * 116*   CO2 13* 19*   * 124*   BUN 64* 66*   CREA 2.75* 3.14*   CA 8.6 8.8   MG 2.4  --    PHOS 3.3  --    ALB 3.0* 3.2*   ALT 34 35     Recent Labs     01/16/22  0254 01/15/22  2023   WBC 6.0 6.7   HGB 12.2 12.2   HCT 41.2 39.0    180     No results found for: SDES  No results found for: CULT  Recent Results (from the past 24 hour(s))   EKG, 12 LEAD, INITIAL    Collection Time: 01/15/22  8:01 PM   Result Value Ref Range Ventricular Rate 117 BPM    Atrial Rate 117 BPM    P-R Interval 134 ms    QRS Duration 110 ms    Q-T Interval 358 ms    QTC Calculation (Bezet) 499 ms    Calculated P Axis 8 degrees    Calculated R Axis 1 degrees    Calculated T Axis 28 degrees    Diagnosis       Sinus tachycardia  Right bundle branch block  Abnormal ECG  No previous ECGs available     CBC WITH AUTOMATED DIFF    Collection Time: 01/15/22  8:23 PM   Result Value Ref Range    WBC 6.7 3.6 - 11.0 K/uL    RBC 4.31 3.80 - 5.20 M/uL    HGB 12.2 11.5 - 16.0 g/dL    HCT 39.0 35.0 - 47.0 %    MCV 90.5 80.0 - 99.0 FL    MCH 28.3 26.0 - 34.0 PG    MCHC 31.3 30.0 - 36.5 g/dL    RDW 13.4 11.5 - 14.5 %    PLATELET 685 881 - 502 K/uL    MPV 10.5 8.9 - 12.9 FL    NRBC 0.0 0  WBC    ABSOLUTE NRBC 0.00 0.00 - 0.01 K/uL    NEUTROPHILS 83 (H) 32 - 75 %    LYMPHOCYTES 10 (L) 12 - 49 %    MONOCYTES 6 5 - 13 %    EOSINOPHILS 0 0 - 7 %    BASOPHILS 0 0 - 1 %    IMMATURE GRANULOCYTES 1 (H) 0.0 - 0.5 %    ABS. NEUTROPHILS 5.5 1.8 - 8.0 K/UL    ABS. LYMPHOCYTES 0.7 (L) 0.8 - 3.5 K/UL    ABS. MONOCYTES 0.4 0.0 - 1.0 K/UL    ABS. EOSINOPHILS 0.0 0.0 - 0.4 K/UL    ABS. BASOPHILS 0.0 0.0 - 0.1 K/UL    ABS. IMM. GRANS. 0.1 (H) 0.00 - 0.04 K/UL    DF SMEAR SCANNED      PLATELET COMMENTS Large Platelets      RBC COMMENTS ANISOCYTOSIS  1+       METABOLIC PANEL, COMPREHENSIVE    Collection Time: 01/15/22  8:23 PM   Result Value Ref Range    Sodium 142 136 - 145 mmol/L    Potassium 3.1 (L) 3.5 - 5.1 mmol/L    Chloride 116 (H) 97 - 108 mmol/L    CO2 19 (L) 21 - 32 mmol/L    Anion gap 7 5 - 15 mmol/L    Glucose 124 (H) 65 - 100 mg/dL    BUN 66 (H) 6 - 20 MG/DL    Creatinine 3.14 (H) 0.55 - 1.02 MG/DL    BUN/Creatinine ratio 21 (H) 12 - 20      GFR est AA 17 (L) >60 ml/min/1.73m2    GFR est non-AA 14 (L) >60 ml/min/1.73m2    Calcium 8.8 8.5 - 10.1 MG/DL    Bilirubin, total 0.3 0.2 - 1.0 MG/DL    ALT (SGPT) 35 12 - 78 U/L    AST (SGOT) 38 (H) 15 - 37 U/L    Alk.  phosphatase 66 45 - 117 U/L    Protein, total 6.3 (L) 6.4 - 8.2 g/dL    Albumin 3.2 (L) 3.5 - 5.0 g/dL    Globulin 3.1 2.0 - 4.0 g/dL    A-G Ratio 1.0 (L) 1.1 - 2.2     SAMPLES BEING HELD    Collection Time: 01/15/22  8:23 PM   Result Value Ref Range    SAMPLES BEING HELD 1RED,1BLU     COMMENT        Add-on orders for these samples will be processed based on acceptable specimen integrity and analyte stability, which may vary by analyte. COVID-19 RAPID TEST    Collection Time: 01/15/22  8:30 PM   Result Value Ref Range    Specimen source Nasopharyngeal      COVID-19 rapid test Detected (A) NOTD     SAMPLES BEING HELD    Collection Time: 01/16/22  2:54 AM   Result Value Ref Range    SAMPLES BEING HELD 1PST,1BLUE,1LAV,1RED     COMMENT        Add-on orders for these samples will be processed based on acceptable specimen integrity and analyte stability, which may vary by analyte. ACETAMINOPHEN    Collection Time: 01/16/22  2:54 AM   Result Value Ref Range    Acetaminophen level <2 (L) 10 - 30 ug/mL   CBC WITH AUTOMATED DIFF    Collection Time: 01/16/22  2:54 AM   Result Value Ref Range    WBC 6.0 3.6 - 11.0 K/uL    RBC 4.22 3.80 - 5.20 M/uL    HGB 12.2 11.5 - 16.0 g/dL    HCT 41.2 35.0 - 47.0 %    MCV 97.6 80.0 - 99.0 FL    MCH 28.9 26.0 - 34.0 PG    MCHC 29.6 (L) 30.0 - 36.5 g/dL    RDW 13.8 11.5 - 14.5 %    PLATELET 314 374 - 012 K/uL    MPV 10.6 8.9 - 12.9 FL    NRBC 0.0 0  WBC    ABSOLUTE NRBC 0.00 0.00 - 0.01 K/uL    NEUTROPHILS 89 (H) 32 - 75 %    LYMPHOCYTES 7 (L) 12 - 49 %    MONOCYTES 2 (L) 5 - 13 %    EOSINOPHILS 0 0 - 7 %    BASOPHILS 0 0 - 1 %    IMMATURE GRANULOCYTES 2 (H) 0.0 - 0.5 %    ABS. NEUTROPHILS 5.4 1.8 - 8.0 K/UL    ABS. LYMPHOCYTES 0.4 (L) 0.8 - 3.5 K/UL    ABS. MONOCYTES 0.1 0.0 - 1.0 K/UL    ABS. EOSINOPHILS 0.0 0.0 - 0.4 K/UL    ABS. BASOPHILS 0.0 0.0 - 0.1 K/UL    ABS. IMM.  GRANS. 0.1 (H) 0.00 - 0.04 K/UL    DF SMEAR SCANNED      RBC COMMENTS MACROCYTOSIS  1+        RBC COMMENTS OVALOCYTES  1+ RBC COMMENTS SCHISTOCYTES  PRESENT       D DIMER    Collection Time: 01/16/22  2:54 AM   Result Value Ref Range    D-dimer >35.20 (H) 0.00 - 0.65 mg/L FEU   ETHYL ALCOHOL    Collection Time: 01/16/22  2:54 AM   Result Value Ref Range    ALCOHOL(ETHYL),SERUM <10 <10 MG/DL   MAGNESIUM    Collection Time: 01/16/22  2:54 AM   Result Value Ref Range    Magnesium 2.4 1.6 - 2.4 mg/dL   METABOLIC PANEL, COMPREHENSIVE    Collection Time: 01/16/22  2:54 AM   Result Value Ref Range    Sodium 144 136 - 145 mmol/L    Potassium 3.8 3.5 - 5.1 mmol/L    Chloride 120 (H) 97 - 108 mmol/L    CO2 13 (LL) 21 - 32 mmol/L    Anion gap 11 5 - 15 mmol/L    Glucose 131 (H) 65 - 100 mg/dL    BUN 64 (H) 6 - 20 MG/DL    Creatinine 2.75 (H) 0.55 - 1.02 MG/DL    BUN/Creatinine ratio 23 (H) 12 - 20      GFR est AA 20 (L) >60 ml/min/1.73m2    GFR est non-AA 16 (L) >60 ml/min/1.73m2    Calcium 8.6 8.5 - 10.1 MG/DL    Bilirubin, total 0.3 0.2 - 1.0 MG/DL    ALT (SGPT) 34 12 - 78 U/L    AST (SGOT) 39 (H) 15 - 37 U/L    Alk.  phosphatase 64 45 - 117 U/L    Protein, total 6.1 (L) 6.4 - 8.2 g/dL    Albumin 3.0 (L) 3.5 - 5.0 g/dL    Globulin 3.1 2.0 - 4.0 g/dL    A-G Ratio 1.0 (L) 1.1 - 2.2     NT-PRO BNP    Collection Time: 01/16/22  2:54 AM   Result Value Ref Range    NT pro- (H) <450 PG/ML   PHOSPHORUS    Collection Time: 01/16/22  2:54 AM   Result Value Ref Range    Phosphorus 3.3 2.6 - 4.7 MG/DL   SALICYLATE    Collection Time: 01/16/22  2:54 AM   Result Value Ref Range    Salicylate level <7.7 (L) 2.8 - 20.0 MG/DL   TROPONIN-HIGH SENSITIVITY    Collection Time: 01/16/22  2:54 AM   Result Value Ref Range    Troponin-High Sensitivity 62 (HH) 0 - 51 ng/L   TSH 3RD GENERATION    Collection Time: 01/16/22  2:54 AM   Result Value Ref Range    TSH 0.43 0.36 - 3.74 uIU/mL   URINALYSIS W/MICROSCOPIC    Collection Time: 01/16/22  7:13 AM   Result Value Ref Range    Color YELLOW/STRAW      Appearance CLEAR CLEAR      Specific gravity 1.015 1.003 - 1.030 pH (UA) 5.5 5.0 - 8.0      Protein 100 (A) NEG mg/dL    Glucose Negative NEG mg/dL    Ketone Negative NEG mg/dL    Bilirubin Negative NEG      Blood SMALL (A) NEG      Urobilinogen 0.2 0.2 - 1.0 EU/dL    Nitrites Positive (A) NEG      Leukocyte Esterase SMALL (A) NEG      WBC 0-4 0 - 4 /hpf    RBC 0-5 0 - 5 /hpf    Epithelial cells FEW FEW /lpf    Bacteria Negative NEG /hpf   SAMPLES BEING HELD    Collection Time: 01/16/22  7:13 AM   Result Value Ref Range    SAMPLES BEING HELD UC HOLD     COMMENT        Add-on orders for these samples will be processed based on acceptable specimen integrity and analyte stability, which may vary by analyte. C REACTIVE PROTEIN, QT    Collection Time: 01/16/22 10:47 AM   Result Value Ref Range    C-Reactive protein 1.75 (H) 0.00 - 0.60 mg/dL   LD    Collection Time: 01/16/22 10:47 AM   Result Value Ref Range     (H) 81 - 246 U/L   FERRITIN    Collection Time: 01/16/22 10:47 AM   Result Value Ref Range    Ferritin 847 (H) 26 - 388 NG/ML   SAMPLES BEING HELD    Collection Time: 01/16/22 10:47 AM   Result Value Ref Range    SAMPLES BEING HELD 1lav,1blue     COMMENT        Add-on orders for these samples will be processed based on acceptable specimen integrity and analyte stability, which may vary by analyte.          Urine dipstick:   Lab Results   Component Value Date/Time    Color YELLOW/STRAW 01/16/2022 07:13 AM    Appearance CLEAR 01/16/2022 07:13 AM    Specific gravity 1.015 01/16/2022 07:13 AM    Specific gravity 1.015 01/09/2022 02:37 PM    pH (UA) 5.5 01/16/2022 07:13 AM    Protein 100 (A) 01/16/2022 07:13 AM    Glucose Negative 01/16/2022 07:13 AM    Ketone Negative 01/16/2022 07:13 AM    Bilirubin Negative 01/16/2022 07:13 AM    Urobilinogen 0.2 01/16/2022 07:13 AM    Nitrites Positive (A) 01/16/2022 07:13 AM    Leukocyte Esterase SMALL (A) 01/16/2022 07:13 AM    Epithelial cells FEW 01/16/2022 07:13 AM    Bacteria Negative 01/16/2022 07:13 AM    WBC 0-4 01/16/2022 07:13 AM    RBC 0-5 01/16/2022 07:13 AM       I have reviewed the following: All pertinent labs, microbiology data, radiology imaging for my assessment     Medications list Personally Reviewed   [x]      Yes     []               No       Medications:  Prior to Admission medications    Medication Sig Start Date End Date Taking? Authorizing Provider   megestroL (MEGACE) 20 mg tablet Take 1 Tablet by mouth daily. 1/13/22   Duyen Singleton MD   metoprolol succinate (Toprol XL) 50 mg XL tablet Take 50 mg by mouth daily. Patient not taking: Reported on 1/13/2022    Provider, Historical   sertraline (ZOLOFT) 25 mg tablet Take 25 mg by mouth nightly. 12.5 mg    Provider, Historical   OTHER,NON-FORMULARY, Blood sugar management herbal supplement one pill daily    Provider, Historical   pantoprazole (PROTONIX) 40 mg tablet TAKE 1 TABLET EVERY DAY 10/18/21   [place] Gain, NP   mycophenolate mofetil (CELLCEPT) 250 mg capsule Take 750 mg by mouth two (2) times a day. 11/25/20   Provider, Historical   aspirin 81 mg chewable tablet Take 81 mg by mouth daily. Provider, Historical   losartan (COZAAR) 25 mg tablet Take 25 mg by mouth daily. 1/8/19   Provider, Historical        Thank you for allowing us to participate in the care of this patient. We will follow patient. Please dont hesitate to call with any questions    Denice Oliveira MD  [facility]   [address] [address]  Phone - [phone]   Fax - [phone]  www. [facility]

## 2022-01-16 NOTE — HOSPICE
[facility]  [phone]    Patient Name: Toney Wilhelm  YOB: 1933  Age: 80 y.o. [address] [facility] RN Note:      Spoke with dtr Ander Vargas ([phone]). She states that when patient is stable to be discharged home with hospice they wish to use [facility] as they have served them in the past. We will continue to follow patient while she remains inpatient in case her condition requires inpatient assessment. Thank you for the opportunity to be of service to this patient.     Sharon Humphrey, [facility]  [phone]

## 2022-01-16 NOTE — ED NOTES
TRANSFER - OUT REPORT:    Verbal report given to RC Palacios(name) on Elizabeth Lieu  being transferred to 616(unit) for routine progression of care       Report consisted of patients Situation, Background, Assessment and   Recommendations(SBAR). Information from the following report(s) SBAR, ED Summary, Intake/Output, Recent Results, Med Rec Status and Alarm Parameters  was reviewed with the receiving nurse. Lines:   Peripheral IV 01/16/22 Left Arm (Active)   Site Assessment Clean, dry, & intact 01/16/22 1222   Phlebitis Assessment 0 01/16/22 1222   Infiltration Assessment 0 01/16/22 1222   Dressing Status Clean, dry, & intact 01/16/22 1222   Dressing Type Transparent 01/16/22 1222   Hub Color/Line Status Pink;Flushed;Patent 01/16/22 1222        Opportunity for questions and clarification was provided.       Patient transported with:   [employer]

## 2022-01-16 NOTE — ED PROVIDER NOTES
80 y.o. female presents today secondary to AMS. Dx with covid 6 days ago. Here with ams which is worsening per family. Has has nausea and vomiting. Noted to be hypoxic in 70's by EMS. She is confused however expresses no complaints. Hx limited due to dementia. Full history, physical exam, and ROS unable to be obtained due to:  dementia and confusion. 9:00 PM d/w daughter. \"poor eating habits\" x \"quite some time\", seen recently at another ED and admitted for diverticulitis. Increasing confusion/difficulty breathing. On cipro and flagyl. Past Medical History:   Diagnosis Date    Chronic renal failure     dx'd 2/2013    Diverticulitis     Hyperlipidemia     Hypertension     Peptic ulcer disease     Transient ischemic attack     2003    Urinary tract infection     recurrent        Past Surgical History:   Procedure Laterality Date    HX BREAST BIOPSY      [place]    IR PTA PERIPHERAL ARTERY  10/8/2020             Social History     Socioeconomic History    Marital status:      Spouse name: Not on file    Number of children: Not on file    Years of education: Not on file    Highest education level: Not on file   Occupational History    Not on file   Tobacco Use    Smoking status: Never Smoker    Smokeless tobacco: Never Used   Substance and Sexual Activity    Alcohol use: No    Drug use: No    Sexual activity: Never   Other Topics Concern    Not on file   Social History Narrative    Not on file     Social Determinants of Health     Financial Resource Strain:     Difficulty of Paying Living Expenses: Not on file   Food Insecurity:     Worried About Running Out of Food in the Last Year: Not on file    [place] of Food in the Last Year: Not on file   Transportation Needs:     Lack of Transportation (Medical): Not on file    Lack of Transportation (Non-Medical):  Not on file   Physical Activity:     Days of Exercise per Week: Not on file    Minutes of Exercise per Session: Not on file   Stress:     Feeling of Stress : Not on file   Social Connections:     Frequency of Communication with Friends and Family: Not on file    Frequency of Social Gatherings with Friends and Family: Not on file    Attends Muslim Services: Not on file    Active Member of Clubs or Organizations: Not on file    Attends Club or Organization Meetings: Not on file    Marital Status: Not on file   Intimate Partner Violence:     Fear of Current or Ex-Partner: Not on file    Emotionally Abused: Not on file    Physically Abused: Not on file    Sexually Abused: Not on file   Housing Stability:     Unable to Pay for Housing in the Last Year: Not on file    Number of [place] in the Last Year: Not on file    Unstable Housing in the Last Year: Not on file         ALLERGIES: Sulfa (sulfonamide antibiotics) and Penicillins    Review of Systems   Unable to perform ROS: Mental status change       Vitals:    01/15/22 1954   BP: 115/74   Pulse: (!) 118   Resp: 20   Temp: 98.4 °F (36.9 °C)   SpO2: 93%   Weight: 50.2 kg (110 lb 10.7 oz)            Physical Exam  Vitals and nursing note reviewed. Constitutional:       General: She is not in acute distress. Appearance: She is well-developed. She is not diaphoretic. Interventions: Nasal cannula in place. HENT:      Head: Normocephalic. Mouth/Throat:      Pharynx: No oropharyngeal exudate. Eyes:      General:         Right eye: No discharge. Left eye: No discharge. Pupils: Pupils are equal, round, and reactive to light. Cardiovascular:      Rate and Rhythm: Regular rhythm. Tachycardia present. Heart sounds: Normal heart sounds. No murmur heard. No friction rub. No gallop. Pulmonary:      Effort: Pulmonary effort is normal. No respiratory distress. Breath sounds: Normal breath sounds. No stridor. No wheezing or rales. Abdominal:      General: Bowel sounds are normal. There is no distension.       Palpations: Abdomen is soft. Tenderness: There is no abdominal tenderness. There is no guarding or rebound. Musculoskeletal:         General: No deformity. Normal range of motion. Cervical back: Normal range of motion and neck supple. Skin:     General: Skin is warm and dry. Capillary Refill: Capillary refill takes less than 2 seconds. Findings: No rash. Neurological:      Mental Status: She is alert and oriented to person, place, and time. Psychiatric:         Behavior: Behavior normal.          MDM       Procedures        Covid +  No leukocytosis or anemia  hypoK  Metabolic acidosis  Acute on chronic kidney insufficiency    CXR shows b/l pneumonia  CT abd ordered due to vomiting with recent diverticulitis--looks ok    500ml IVF ordered  Decadron IV ordered    Perfect Serve Consult for Admission  9:49 PM    ED Room Number: ER29/29  Patient Name and age:  Sariah Motta 80 y.o.  female  Working Diagnosis:   1. Pneumonia due to COVID-19 virus    2. Hypoxia    3. Altered mental status, unspecified altered mental status type        COVID-19 Suspicion:  yes  Sepsis present:  no  Reassessment needed: no  Code Status:  Full Code  Readmission: Other different hospital  Isolation Requirements:  yes  Recommended Level of Care:  telemetry  Department:[facility] Adult ED - 21   Other:  80 y.o. female here with AMS, patrick on ckd, b/l pna d/t covid, hypoxia on 2L o2 (was in 70's for EMS, lowest 80's here).      Andrea Thomas, DO

## 2022-01-17 ENCOUNTER — APPOINTMENT (OUTPATIENT)
Dept: NON INVASIVE DIAGNOSTICS | Age: 87
DRG: 177 | End: 2022-01-17
Attending: INTERNAL MEDICINE
Payer: MEDICARE

## 2022-01-17 LAB
ATRIAL RATE: 117 BPM
CALCULATED P AXIS, ECG09: 8 DEGREES
CALCULATED R AXIS, ECG10: 1 DEGREES
CALCULATED T AXIS, ECG11: 28 DEGREES
DIAGNOSIS, 93000: NORMAL
ECHO AO ROOT DIAM: 3.2 CM
ECHO AO ROOT INDEX: 2.3 CM/M2
ECHO AR MAX VEL PISA: 4.6 M/S
ECHO AV AREA PEAK VELOCITY: 1.8 CM2
ECHO AV AREA PEAK VELOCITY: 1.8 CM2
ECHO AV PEAK GRADIENT: 7 MMHG
ECHO AV PEAK VELOCITY: 1.3 M/S
ECHO AV REGURGITANT PHT: 435.4 MILLISECOND
ECHO AV VELOCITY RATIO: 0.62
ECHO EST RA PRESSURE: 3 MMHG
ECHO LA DIAMETER INDEX: 2.09 CM/M2
ECHO LA DIAMETER: 2.9 CM
ECHO LA TO AORTIC ROOT RATIO: 0.91
ECHO LV FRACTIONAL SHORTENING: 38 % (ref 28–44)
ECHO LV INTERNAL DIMENSION DIASTOLE INDEX: 2.88 CM/M2
ECHO LV INTERNAL DIMENSION DIASTOLIC: 4 CM (ref 3.9–5.3)
ECHO LV INTERNAL DIMENSION SYSTOLIC INDEX: 1.8 CM/M2
ECHO LV INTERNAL DIMENSION SYSTOLIC: 2.5 CM
ECHO LV IVSD: 0.8 CM (ref 0.6–0.9)
ECHO LV MASS 2D: 93.5 G (ref 67–162)
ECHO LV MASS INDEX 2D: 67.2 G/M2 (ref 43–95)
ECHO LV POSTERIOR WALL DIASTOLIC: 0.8 CM (ref 0.6–0.9)
ECHO LV RELATIVE WALL THICKNESS RATIO: 0.4
ECHO LVOT AREA: 2.8 CM2
ECHO LVOT DIAM: 1.9 CM
ECHO LVOT PEAK GRADIENT: 3 MMHG
ECHO LVOT PEAK VELOCITY: 0.8 M/S
ECHO MV A VELOCITY: 0.78 M/S
ECHO MV AREA PHT: 2.1 CM2
ECHO MV E DECELERATION TIME (DT): 363.7 MS
ECHO MV E VELOCITY: 0.53 M/S
ECHO MV E/A RATIO: 0.68
ECHO MV PRESSURE HALF TIME (PHT): 105.5 MS
ECHO PV MAX VELOCITY: 0.8 M/S
ECHO PV PEAK GRADIENT: 3 MMHG
ECHO RV FREE WALL PEAK S': 8 CM/S
ECHO RV INTERNAL DIMENSION: 2.5 CM
ECHO RV TAPSE: 1.8 CM (ref 1.5–2)
P-R INTERVAL, ECG05: 134 MS
Q-T INTERVAL, ECG07: 358 MS
QRS DURATION, ECG06: 110 MS
QTC CALCULATION (BEZET), ECG08: 499 MS
VENTRICULAR RATE, ECG03: 117 BPM

## 2022-01-17 PROCEDURE — 93306 TTE W/DOPPLER COMPLETE: CPT | Performed by: INTERNAL MEDICINE

## 2022-01-17 PROCEDURE — 65270000029 HC RM PRIVATE

## 2022-01-17 PROCEDURE — 77010033678 HC OXYGEN DAILY

## 2022-01-17 PROCEDURE — 93306 TTE W/DOPPLER COMPLETE: CPT

## 2022-01-17 PROCEDURE — 74011250637 HC RX REV CODE- 250/637: Performed by: INTERNAL MEDICINE

## 2022-01-17 PROCEDURE — 74011000250 HC RX REV CODE- 250: Performed by: INTERNAL MEDICINE

## 2022-01-17 PROCEDURE — 74011250636 HC RX REV CODE- 250/636: Performed by: INTERNAL MEDICINE

## 2022-01-17 RX ORDER — DEXAMETHASONE 6 MG/1
TABLET ORAL
Qty: 9 TABLET | Refills: 0 | Status: SHIPPED | OUTPATIENT
Start: 2022-01-17

## 2022-01-17 RX ADMIN — LOSARTAN POTASSIUM 25 MG: 25 TABLET, FILM COATED ORAL at 08:44

## 2022-01-17 RX ADMIN — MEGESTROL ACETATE 20 MG: 40 SUSPENSION ORAL at 08:44

## 2022-01-17 RX ADMIN — PANTOPRAZOLE SODIUM 40 MG: 40 TABLET, DELAYED RELEASE ORAL at 08:44

## 2022-01-17 RX ADMIN — DEXAMETHASONE SODIUM PHOSPHATE 6 MG: 4 INJECTION, SOLUTION INTRA-ARTICULAR; INTRALESIONAL; INTRAMUSCULAR; INTRAVENOUS; SOFT TISSUE at 05:58

## 2022-01-17 RX ADMIN — HEPARIN SODIUM 5000 UNITS: 5000 INJECTION, SOLUTION INTRAVENOUS; SUBCUTANEOUS at 05:58

## 2022-01-17 RX ADMIN — SODIUM CHLORIDE, PRESERVATIVE FREE 10 ML: 5 INJECTION INTRAVENOUS at 15:38

## 2022-01-17 RX ADMIN — SERTRALINE 25 MG: 50 TABLET, FILM COATED ORAL at 20:43

## 2022-01-17 RX ADMIN — SODIUM CHLORIDE, PRESERVATIVE FREE 10 ML: 5 INJECTION INTRAVENOUS at 20:44

## 2022-01-17 RX ADMIN — SODIUM CHLORIDE, PRESERVATIVE FREE 20 MG: 5 INJECTION INTRAVENOUS at 08:44

## 2022-01-17 RX ADMIN — HEPARIN SODIUM 5000 UNITS: 5000 INJECTION, SOLUTION INTRAVENOUS; SUBCUTANEOUS at 20:44

## 2022-01-17 RX ADMIN — HEPARIN SODIUM 5000 UNITS: 5000 INJECTION, SOLUTION INTRAVENOUS; SUBCUTANEOUS at 15:38

## 2022-01-17 RX ADMIN — ASPIRIN 81 MG CHEWABLE TABLET 81 MG: 81 TABLET CHEWABLE at 08:44

## 2022-01-17 NOTE — DISCHARGE SUMMARY
Discharge Summary       PATIENT ID: Pieter Opitz  MRN: [id number]   YOB: 1933    DATE OF ADMISSION: 1/15/2022  7:43 PM    DATE OF DISCHARGE: 01/17/22   PRIMARY CARE PROVIDER: Gustavo Dewey MD     ATTENDING PHYSICIAN: Celso Melgar MD  DISCHARGING PROVIDER: Celso Melgar MD    To contact this individual call [phone] and ask the  to page. If unavailable ask to be transferred the Adult Hospitalist Department. CONSULTATIONS: IP CONSULT TO NEPHROLOGY  IP CONSULT TO PALLIATIVE CARE - PROVIDER  IP CONSULT TO NEPHROLOGY  IP CONSULT TO CARDIOLOGY    PROCEDURES/SURGERIES: * No surgery found *    ADMITTING DIAGNOSES & HOSPITAL COURSE:   HPI: \"This is an 27-year-old woman with a past medical history significant for hypertension, anxiety/depression, chronic kidney disease, who was in her usual state of health until the day of her presentation at the emergency room when it was reported that the patient developed change in mental status.  The patient also has generalized weakness according to report from family members as well as fatigue, poor appetite.  EMS was called and when the EMS arrived at the scene, the patient's oxygen saturation was noted to be 70%.  She was brought to the emergency room for further evaluation.  The patient was recently admitted to the hospital from 01/09/2022 to 01/11/2022.  The patient was admitted and treated for diverticulitis.  When the patient arrived at the emergency room, chest x-ray shows bilateral lower lobe and mild interstitial pneumonia.  The patient tested positive for COVID-19 virus infection.  She was then referred to the hospitalist service for evaluation for admission.  No history of fever, rigors, or chills.  The patient is not able to provide history because of suspected underlying memory impairment. \"    Patient was evaluated for acute encephalopathy Likely Delirium on dementia with AHRF COVID-positive pneumonia.   Patient stable on 2 L nasal cannula. Patient's D-dimer was mildly elevated and found to have CODIE on CKD initially started on bicarb and TTE and CTA chest ordered to r/o PE. Cardio and nephrology had evaluated patient. After discussion with POA daughter agreed to make patient hospice care and patient is DNR/DNI. Agreed to comfort care measures. Agreed to no escalation of care and no further invasive testing. DC with home hospice. DISCHARGE DIAGNOSES / PLAN:      Acute delirium on demenita  -CT head, UA  neg   -spoke to POA patient has baseline dementia that has been worsening, recently hospitalized and after discharge was refusing PO.      Acute respiratory failure with hypoxia. COVID +   -didmer elevated, spoke to daughter POA agreed to no escalation of care would like comfort measures and agreed to speak to hospice. - continue with supplemental oxygen, decadron   -will hold off on further invasive studies at this time      Hyperglycemia - improved no hx of dm       Hypokalemia.  We will replace potassium and repeat potassium level.     Acute-on-chronic kidney disease stage V  AGMA.    -nephro following recs appreciated      6.  Anxiety/depression.  We will continue with home medication.     patient is DNR/DNI POA wishes to pursue comfort measures and agreement for hospice care.     Code status: DNR  DVT prophylaxis: was on heparin gtt, stopped no further escalation of care      Care Plan discussed with: Patient/Family and Nurse  Anticipated Disposition: TBD  Anticipated Discharge: 24 hours to 48 hours           FOLLOW UP APPOINTMENTS:    Follow-up Information     Follow up With Specialties Details Why Contact Info    Eben Singleton MD Internal Medicine In 1 week  [facility] [phone]  [phone]             ADDITIONAL CARE RECOMMENDATIONS: comfort measures     DIET: Resume previous diet    ACTIVITY: Activity as tolerated          DISCHARGE MEDICATIONS:  Current Discharge Medication List      START taking these medications    Details   dexAMETHasone (DECADRON) 6 mg tablet Take 1 tab by mouth daily  Qty: 9 Tablet, Refills: 0  Start date: 1/17/2022         CONTINUE these medications which have NOT CHANGED    Details   megestroL (MEGACE) 20 mg tablet Take 1 Tablet by mouth daily. Qty: 30 Tablet, Refills: 1    Associated Diagnoses: Weight loss      metoprolol succinate (Toprol XL) 50 mg XL tablet Take 50 mg by mouth daily. sertraline (ZOLOFT) 25 mg tablet Take 25 mg by mouth nightly. 12.5 mg      OTHER,NON-FORMULARY, Blood sugar management herbal supplement one pill daily      pantoprazole (PROTONIX) 40 mg tablet TAKE 1 TABLET EVERY DAY  Qty: 90 Tablet, Refills: 0    Associated Diagnoses: Gastroesophageal reflux disease      mycophenolate mofetil (CELLCEPT) 250 mg capsule Take 750 mg by mouth two (2) times a day. aspirin 81 mg chewable tablet Take 81 mg by mouth daily. losartan (COZAAR) 25 mg tablet Take 25 mg by mouth daily. Refills: 1               NOTIFY YOUR PHYSICIAN FOR ANY OF THE FOLLOWING:   Fever over 101 degrees for 24 hours. Chest pain, shortness of breath, fever, chills, nausea, vomiting, diarrhea, change in mentation, falling, weakness, bleeding. Severe pain or pain not relieved by medications. Or, any other signs or symptoms that you may have questions about.     DISPOSITION:    Home With:   OT  PT  HH  RN       Long term SNF/Inpatient Rehab    Independent/assisted living   x Hospice    Other:       PATIENT CONDITION AT DISCHARGE:     Functional status   x Poor     Deconditioned     Independent      Cognition     Lucid     Forgetful    x Dementia      Code status     Full code    x DNR      PHYSICAL EXAMINATION AT DISCHARGE:  I had a face to face encounter with this patient and independently examined them on 1/17/2022 as outlined below:                                                   Constitutional:  No acute distress, cooperative, pleasant    ENT: Oral mucosa moist, oropharynx benign. Resp:  CTA bilaterally. No wheezing/rhonchi/rales. No accessory muscle use   CV:  Regular rhythm, normal rate, no murmurs, gallops, rubs    GI:  Soft, non distended, non tender. normoactive bowel sounds, no hepatosplenomegaly     Musculoskeletal:  No edema, warm, 2+ pulses throughout    Neurologic:  Moves all extremities.   AAOx1 to name, CN II-XII reviewed         CHRONIC MEDICAL DIAGNOSES:  Problem List as of 1/17/2022 Date Reviewed: 1/16/2022          Codes Class Noted - Resolved    * (Principal) Acute delirium ICD-10-CM: R41.0  ICD-9-CM: 780.09  1/15/2022 - Present        Sinus tachycardia ICD-10-CM: R00.0  ICD-9-CM: 427.89  1/10/2022 - Present        Weakness ICD-10-CM: R53.1  ICD-9-CM: 780.79  1/10/2022 - Present        Nausea & vomiting ICD-10-CM: R11.2  ICD-9-CM: 787.01  1/10/2022 - Present        Pneumonia due to COVID-19 virus ICD-10-CM: U07.1, J12.82  ICD-9-CM: 480.8, 079.89  1/10/2022 - Present        Acute diverticulitis ICD-10-CM: K57.92  ICD-9-CM: 562.11  1/9/2022 - Present        CKD (chronic kidney disease) stage 4, GFR 15-29 ml/min (HCC) ICD-10-CM: N18.4  ICD-9-CM: 585.4  1/9/2022 - Present        HTN (hypertension), benign ICD-10-CM: I10  ICD-9-CM: 401.1  1/9/2022 - Present        GERD (gastroesophageal reflux disease) ICD-10-CM: K21.9  ICD-9-CM: 530.81  1/9/2022 - Present        Anxiety ICD-10-CM: F41.9  ICD-9-CM: 300.00  1/9/2022 - Present        MCI (mild cognitive impairment) ICD-10-CM: G31.84  ICD-9-CM: 331.83  1/9/2022 - Present        RESOLVED: Severe obesity ([address] ICD-10-CM: E66.01  ICD-9-CM: 278.01  10/22/2019 - 1/10/2022        RESOLVED: Chronic kidney disease (CKD) stage G3b/A3, moderately decreased glomerular filtration rate (GFR) between 30-44 mL/min/1.73 square meter and albuminuria creatinine ratio greater than 300 mg/g (HCC) ICD-10-CM: M62.35  ICD-9-CM: 585.3  3/13/2019 - 1/10/2022              Greater than 30 minutes were spent with the patient on counseling and coordination of care    Signed:   Omid Leyva MD  1/17/2022  12:03 PM

## 2022-01-17 NOTE — PROGRESS NOTES
LUZ- Pending referral to [facility]. Reason for Admission:   Acute delirium                    RUR Score: 18%                 PCP: First and Last name:   Aileen Reyes MD     Name of Practice:    Are you a current patient: Yes/No:    Approximate date of last visit:    Can you participate in a virtual visit if needed:     Do you (patient/family) have any concerns for transition/discharge? No               Plan for utilizing home health:   No    Current Advanced Directive/Advance Care Plan:  DNR      Healthcare Decision Maker:   Click here to complete [address] including selection of the Healthcare Decision Maker Relationship (ie \"Primary\")            Primary Decision MakeDuarte Jasso - Daughter - [phone]    Transition of Care Plan:     CM spoke with pt's daughter, Ms. Inocencio Roman, to introduce her to the role of CM and transition of care. Pt's demographics and PCP were verified. Cm informed the daughter that this pt has an order for hospice and offered her choice. She said that she would like to use [facility]. She is planning to stay with this pt in her home to be her primary caregiver. Aiden sent this hospice referral to [facility] via [employer]  [address] Management Interventions  PCP Verified by CM:  Yes  Transition of Care Consult (CM Consult): Discharge Planning  Physical Therapy Consult: No  Occupational Therapy Consult: No  Speech Therapy Consult: No  Support Systems: Child(heath)  The Plan for Transition of Care is Related to the Following Treatment Goals : safe d/c  The Patient and/or Patient Representative was Provided with a Choice of Provider and Agrees with the Discharge Plan?: Yes  [employer] Information Provided?: No

## 2022-01-17 NOTE — CONSULTS
Palliative    Note that patient has plan to go home with hospice, will cancel palliative consult  Please call as needed [phone]

## 2022-01-17 NOTE — DISCHARGE INSTRUCTIONS
Carolann Morataya MD   Physician   Internal Medicine   Discharge Summary      Addendum   Date of Service:  01/17/22 1203                       []Hide copied text    []Corie for details       Discharge Summary         PATIENT ID: Lucila Mendez  MRN: [id number]   YOB: 1933    DATE OF ADMISSION: 1/15/2022  7:43 PM    DATE OF DISCHARGE: 01/17/22   PRIMARY CARE PROVIDER: Anitha Page MD      ATTENDING PHYSICIAN: Delmer Bronson MD  DISCHARGING PROVIDER: Delmer Bronson MD    To contact this individual call [phone] and ask the  to page.   If unavailable ask to be transferred the Adult Hospitalist Department.     CONSULTATIONS: IP CONSULT TO NEPHROLOGY  IP CONSULT TO PALLIATIVE CARE - PROVIDER  IP CONSULT TO NEPHROLOGY  IP CONSULT TO CARDIOLOGY     PROCEDURES/SURGERIES: * No surgery found *     ADMITTING DIAGNOSES & HOSPITAL COURSE:   HPI: \"This is an 60-year-old woman with a past medical history significant for hypertension, anxiety/depression, chronic kidney disease, who was in her usual state of health until the day of her presentation at the emergency room when it was reported that the patient developed change in mental status.  The patient also has generalized weakness according to report from family members as well as fatigue, poor appetite.  EMS was called and when the EMS arrived at the scene, the patient's oxygen saturation was noted to be 70%.  She was brought to the emergency room for further evaluation.  The patient was recently admitted to the hospital from 01/09/2022 to 01/11/2022.  The patient was admitted and treated for diverticulitis.  When the patient arrived at the emergency room, chest x-ray shows bilateral lower lobe and mild interstitial pneumonia.  The patient tested positive for COVID-19 virus infection.  She was then referred to the hospitalist service for evaluation for admission.  No history of fever, rigors, or chills.  The patient is not able to provide history because of suspected underlying memory impairment. \"     Patient was evaluated for acute encephalopathy Likely Delirium on dementia with AHRF COVID-positive pneumonia. Patient stable on 2 L nasal cannula. Patient's D-dimer was mildly elevated and found to have CODIE on CKD initially started on bicarb and TTE and CTA chest ordered to r/o PE. Cardio and nephrology had evaluated patient. After discussion with POA daughter agreed to make patient hospice care and patient is DNR/DNI. Agreed to comfort care measures. Agreed to no escalation of care and no further invasive testing. DC with home hospice.                  DISCHARGE DIAGNOSES / PLAN:       Acute delirium on demenita  -CT head, UA  neg   -spoke to POA patient has baseline dementia that has been worsening, recently hospitalized and after discharge was refusing PO.      Acute respiratory failure with hypoxia. COVID +   -didmer elevated, spoke to daughter POA agreed to no escalation of care would like comfort measures and agreed to speak to hospice. - continue with supplemental oxygen, decadron   -will hold off on further invasive studies at this time      Hyperglycemia - improved no hx of dm       Hypokalemia.  We will replace potassium and repeat potassium level.     Acute-on-chronic kidney disease stage V  AGMA.    -nephro following recs appreciated      6.  Anxiety/depression.  We will continue with home medication.     patient is DNR/DNI POA wishes to pursue comfort measures and agreement for hospice care.     Code status: DNR  DVT prophylaxis: was on heparin gtt, stopped no further escalation of care      Care Plan discussed with: Patient/Family and Nurse  Anticipated Disposition: TBD  Anticipated Discharge: 24 hours to 48 hours               FOLLOW UP APPOINTMENTS:             Follow-up Information      Follow up With Specialties Details Why Contact Info     Jazlyn Singleton MD Internal Medicine In 1 week   [address] [facility] [address]  [phone]                ADDITIONAL CARE RECOMMENDATIONS: comfort measures      DIET: Resume previous diet     ACTIVITY: Activity as tolerated              DISCHARGE MEDICATIONS:       Current Discharge Medication List       START taking these medications     Details   dexAMETHasone (DECADRON) 6 mg tablet Take 1 tab by mouth daily  Qty: 9 Tablet, Refills: 0  Start date: 1/17/2022                CONTINUE these medications which have NOT CHANGED     Details   megestroL (MEGACE) 20 mg tablet Take 1 Tablet by mouth daily. Qty: 30 Tablet, Refills: 1     Associated Diagnoses: Weight loss       metoprolol succinate (Toprol XL) 50 mg XL tablet Take 50 mg by mouth daily.       sertraline (ZOLOFT) 25 mg tablet Take 25 mg by mouth nightly. 12.5 mg       OTHER,NON-FORMULARY, Blood sugar management herbal supplement one pill daily       pantoprazole (PROTONIX) 40 mg tablet TAKE 1 TABLET EVERY DAY  Qty: 90 Tablet, Refills: 0     Associated Diagnoses: Gastroesophageal reflux disease       mycophenolate mofetil (CELLCEPT) 250 mg capsule Take 750 mg by mouth two (2) times a day.       aspirin 81 mg chewable tablet Take 81 mg by mouth daily.       losartan (COZAAR) 25 mg tablet Take 25 mg by mouth daily. Refills: 1                    NOTIFY YOUR PHYSICIAN FOR ANY OF THE FOLLOWING:   Fever over 101 degrees for 24 hours. Chest pain, shortness of breath, fever, chills, nausea, vomiting, diarrhea, change in mentation, falling, weakness, bleeding. Severe pain or pain not relieved by medications.   Or, any other signs or symptoms that you may have questions about.     DISPOSITION:    Home With:    OT   PT   HH   RN        Long term SNF/Inpatient Rehab     Independent/assisted living   x Hospice     Other:         PATIENT CONDITION AT DISCHARGE:      Functional status   x Poor      Deconditioned      Independent       Cognition     Lucid      Forgetful    x Dementia       Code status     Full code    x DNR    PHYSICAL EXAMINATION AT DISCHARGE:  I had a face to face encounter with this patient and independently examined them on 1/17/2022 as outlined below:                                                   Constitutional:  No acute distress, cooperative, pleasant    ENT:  Oral mucosa moist, oropharynx benign. Resp:  CTA bilaterally. No wheezing/rhonchi/rales. No accessory muscle use   CV:  Regular rhythm, normal rate, no murmurs, gallops, rubs    GI:  Soft, non distended, non tender. normoactive bowel sounds, no hepatosplenomegaly     Musculoskeletal:  No edema, warm, 2+ pulses throughout    Neurologic:  Moves all extremities.  AAOx1 to name, CN II-XII reviewed            CHRONIC MEDICAL DIAGNOSES:             Problem List as of 1/17/2022 Date Reviewed: 1/16/2022           Codes Class Noted - Resolved     * (Principal) Acute delirium ICD-10-CM: R41.0  ICD-9-CM: 780.09   1/15/2022 - Present           Sinus tachycardia ICD-10-CM: R00.0  ICD-9-CM: 427.89   1/10/2022 - Present           Weakness ICD-10-CM: R53.1  ICD-9-CM: 780.79   1/10/2022 - Present           Nausea & vomiting ICD-10-CM: R11.2  ICD-9-CM: 787.01   1/10/2022 - Present           Pneumonia due to COVID-19 virus ICD-10-CM: U07.1, J12.82  ICD-9-CM: 480.8, 079.89   1/10/2022 - Present           Acute diverticulitis ICD-10-CM: B61.53  ICD-9-CM: 562.11   1/9/2022 - Present           CKD (chronic kidney disease) stage 4, GFR 15-29 ml/min (HCC) ICD-10-CM: N18.4  ICD-9-CM: 602. 4   1/9/2022 - Present           HTN (hypertension), benign ICD-10-CM: I10  ICD-9-CM: 401. 1   1/9/2022 - Present           GERD (gastroesophageal reflux disease) ICD-10-CM: K21.9  ICD-9-CM: 530.81   1/9/2022 - Present           Anxiety ICD-10-CM: F41.9  ICD-9-CM: 300.00   1/9/2022 - Present           MCI (mild cognitive impairment) ICD-10-CM: G31.84  ICD-9-CM: 331.83   1/9/2022 - Present           RESOLVED: Severe obesity ([address] ICD-10-CM: E66.01  ICD-9-CM: 278.01   10/22/2019 - 1/10/2022         RESOLVED: Chronic kidney disease (CKD) stage G3b/A3, moderately decreased glomerular filtration rate (GFR) between 30-44 mL/min/1.73 square meter and albuminuria creatinine ratio greater than 300 mg/g (HCC) ICD-10-CM: N18.32  ICD-9-CM: 585. 3   3/13/2019 - 1/10/2022                   Greater than 30 minutes were spent with the patient on counseling and coordination of care     Signed:   Antoni Jackson MD  1/17/2022  12:03 PM               Revision History                                Routing History

## 2022-01-17 NOTE — PROGRESS NOTES
LUZ- D/c to home with [facility]    Jorge received a call from Yobani Vazquez ([phone]) with [facility]. She stated that they can accept this pt for hospice in her home. She is going to order the DME to be delivered to the home today and asked that pt be d/c'd tomorrow. JORGE sent the attending a perfectserve message informing her of this. Cm set up ambulance transport for the morning with [facility] at 10am. CM informed Yobani Vazquez and pt's daughter the ETA for transport tomorrow.  Greta Shirley, JEANA-SW

## 2022-01-17 NOTE — PROGRESS NOTES
Pharmacy Renal Dosing Protocol  Medication: famotidine   Current regimen:  20 mg every 12 hr  Recent Labs     01/16/22  0254 01/15/22  2023   CREA 2.75* 3.14*   BUN 64* 66*     Estimated CrCl:  10 ml/min  Plan: Change to 20 mg q24h for crcl < 50 ml/min

## 2022-01-18 ENCOUNTER — TELEPHONE (OUTPATIENT)
Dept: INTERNAL MEDICINE CLINIC | Age: 87
End: 2022-01-18

## 2022-01-18 VITALS
DIASTOLIC BLOOD PRESSURE: 78 MMHG | RESPIRATION RATE: 17 BRPM | HEIGHT: 57 IN | WEIGHT: 110 LBS | OXYGEN SATURATION: 90 % | BODY MASS INDEX: 23.73 KG/M2 | HEART RATE: 99 BPM | TEMPERATURE: 97.8 F | SYSTOLIC BLOOD PRESSURE: 147 MMHG

## 2022-01-18 LAB
ALBUMIN SERPL-MCNC: 3.4 G/DL (ref 3.5–5)
AMMONIA PLAS-SCNC: <10 UMOL/L
ANION GAP SERPL CALC-SCNC: 9 MMOL/L (ref 5–15)
BUN SERPL-MCNC: 67 MG/DL (ref 6–20)
BUN/CREAT SERPL: 30 (ref 12–20)
CALCIUM SERPL-MCNC: 9.4 MG/DL (ref 8.5–10.1)
CHLORIDE SERPL-SCNC: 121 MMOL/L (ref 97–108)
CO2 SERPL-SCNC: 16 MMOL/L (ref 21–32)
CREAT SERPL-MCNC: 2.27 MG/DL (ref 0.55–1.02)
EST. AVERAGE GLUCOSE BLD GHB EST-MCNC: 123 MG/DL
GLUCOSE SERPL-MCNC: 101 MG/DL (ref 65–100)
HBA1C MFR BLD: 5.9 % (ref 4–5.6)
PHOSPHATE SERPL-MCNC: 3.6 MG/DL (ref 2.6–4.7)
POTASSIUM SERPL-SCNC: 4.3 MMOL/L (ref 3.5–5.1)
SODIUM SERPL-SCNC: 146 MMOL/L (ref 136–145)

## 2022-01-18 PROCEDURE — 36415 COLL VENOUS BLD VENIPUNCTURE: CPT

## 2022-01-18 PROCEDURE — 74011000250 HC RX REV CODE- 250: Performed by: STUDENT IN AN ORGANIZED HEALTH CARE EDUCATION/TRAINING PROGRAM

## 2022-01-18 PROCEDURE — 80069 RENAL FUNCTION PANEL: CPT

## 2022-01-18 PROCEDURE — 74011000250 HC RX REV CODE- 250: Performed by: INTERNAL MEDICINE

## 2022-01-18 PROCEDURE — 77010033678 HC OXYGEN DAILY

## 2022-01-18 PROCEDURE — 82140 ASSAY OF AMMONIA: CPT

## 2022-01-18 PROCEDURE — 74011250637 HC RX REV CODE- 250/637: Performed by: INTERNAL MEDICINE

## 2022-01-18 PROCEDURE — 83036 HEMOGLOBIN GLYCOSYLATED A1C: CPT

## 2022-01-18 PROCEDURE — 94760 N-INVAS EAR/PLS OXIMETRY 1: CPT

## 2022-01-18 PROCEDURE — 74011250636 HC RX REV CODE- 250/636: Performed by: STUDENT IN AN ORGANIZED HEALTH CARE EDUCATION/TRAINING PROGRAM

## 2022-01-18 PROCEDURE — 74011250636 HC RX REV CODE- 250/636: Performed by: INTERNAL MEDICINE

## 2022-01-18 RX ADMIN — ASPIRIN 81 MG CHEWABLE TABLET 81 MG: 81 TABLET CHEWABLE at 08:31

## 2022-01-18 RX ADMIN — LOSARTAN POTASSIUM 25 MG: 25 TABLET, FILM COATED ORAL at 08:31

## 2022-01-18 RX ADMIN — DEXAMETHASONE SODIUM PHOSPHATE 6 MG: 4 INJECTION, SOLUTION INTRA-ARTICULAR; INTRALESIONAL; INTRAMUSCULAR; INTRAVENOUS; SOFT TISSUE at 04:44

## 2022-01-18 RX ADMIN — SODIUM CHLORIDE, PRESERVATIVE FREE 10 ML: 5 INJECTION INTRAVENOUS at 04:45

## 2022-01-18 RX ADMIN — HEPARIN SODIUM 5000 UNITS: 5000 INJECTION, SOLUTION INTRAVENOUS; SUBCUTANEOUS at 04:44

## 2022-01-18 RX ADMIN — SODIUM CHLORIDE, PRESERVATIVE FREE 20 MG: 5 INJECTION INTRAVENOUS at 08:31

## 2022-01-18 RX ADMIN — MEGESTROL ACETATE 20 MG: 40 SUSPENSION ORAL at 08:29

## 2022-01-18 NOTE — TELEPHONE ENCOUNTER
Spoke with patient's daughter and advised her that Dr. Teddy Virk will follow patient through hospice care accept for prescribing pain medications which she will defer to the hospice MD.  LUZ appt provided next week. Pt's daughter understood and was thankful for the call.

## 2022-01-18 NOTE — PROGRESS NOTES
Pt is laying in bed with even and unlabored respirations on room air. No complaints of pain at this time. No distress noted. Dr. Holly Wilson was perfect served about pts sodium bicarbonate and was asked if it needed to be given. Dr. Holly Wilson said to hold medication. Felix care was performed and emptied during the shift. Plan of care is for the pt to be discharged home on [facility]. All safety precautions are in place. Bed in low position and locked. Call bell within reach. Bed alarm is on and in place. Problem: Airway Clearance - Ineffective  Goal: Achieve or maintain patent airway  Outcome: Progressing Towards Goal     Problem: Gas Exchange - Impaired  Goal: Absence of hypoxia  Outcome: Progressing Towards Goal  Goal: Promote optimal lung function  Outcome: Progressing Towards Goal     Problem: Breathing Pattern - Ineffective  Goal: Ability to achieve and maintain a regular respiratory rate  Outcome: Progressing Towards Goal     Problem:  Body Temperature -  Risk of, Imbalanced  Goal: Ability to maintain a body temperature within defined limits  Outcome: Progressing Towards Goal  Goal: Will regain or maintain usual level of consciousness  Outcome: Progressing Towards Goal  Goal: Complications related to the disease process, condition or treatment will be avoided or minimized  Outcome: Progressing Towards Goal     Problem: Isolation Precautions - Risk of Spread of Infection  Goal: Prevent transmission of infectious organism to others  Outcome: Progressing Towards Goal     Problem: Nutrition Deficits  Goal: Optimize nutrtional status  Outcome: Progressing Towards Goal     Problem: Risk for Fluid Volume Deficit  Goal: Maintain normal heart rhythm  Outcome: Progressing Towards Goal  Goal: Maintain absence of muscle cramping  Outcome: Progressing Towards Goal  Goal: Maintain normal serum potassium, sodium, calcium, phosphorus, and pH  Outcome: Progressing Towards Goal     Problem: Loneliness or Risk for Loneliness  Goal: Demonstrate positive use of time alone when socialization is not possible  Outcome: Progressing Towards Goal     Problem: Fatigue  Goal: Verbalize increase energy and improved vitality  Outcome: Progressing Towards Goal     Problem: Patient Education: Go to Patient Education Activity  Goal: Patient/Family Education  Outcome: Progressing Towards Goal     Problem: Pressure Injury - Risk of  Goal: *Prevention of pressure injury  Description: Document Wong Scale and appropriate interventions in the flowsheet. Outcome: Progressing Towards Goal  Note: Pressure Injury Interventions:  Sensory Interventions: Assess changes in LOC,Assess need for specialty bed,Avoid rigorous massage over bony prominences    Moisture Interventions: Apply protective barrier, creams and emollients,Assess need for specialty bed,Check for incontinence Q2 hours and as needed    Activity Interventions: Increase time out of bed,Pressure redistribution bed/mattress(bed type),Chair cushion,Assess need for specialty bed    Mobility Interventions: Chair cushion,Assess need for specialty bed,Float heels,HOB 30 degrees or less    Nutrition Interventions: Document food/fluid/supplement intake,Offer support with meals,snacks and hydration    Friction and Shear Interventions: Apply protective barrier, creams and emollients,Feet elevated on foot rest,Foam dressings/transparent film/skin sealants,HOB 30 degrees or less                Problem: Patient Education: Go to Patient Education Activity  Goal: Patient/Family Education  Outcome: Progressing Towards Goal     Problem: Falls - Risk of  Goal: *Absence of Falls  Description: Document Beth Fall Risk and appropriate interventions in the flowsheet.   Outcome: Progressing Towards Goal     Problem: Patient Education: Go to Patient Education Activity  Goal: Patient/Family Education  Outcome: Progressing Towards Goal

## 2022-01-18 NOTE — PROGRESS NOTES
Pt is laying in bed with even and unlabored respirations on 2 L of oxygen via nasal cannula. No complaints of pain at this time. No distress noted. Pts daughter Emmanuel Page was called and the AVS was explained to her. All questions were answered. All belongings have been packed up in pt belongings bags. AVS was placed in belongings bags. IV was removed with little to no bleeding noted. Transport has arrived to take the pt home. All safety precautions are in place. Problem: Airway Clearance - Ineffective  Goal: Achieve or maintain patent airway  Outcome: Resolved/Not Met     Problem: Gas Exchange - Impaired  Goal: Absence of hypoxia  Outcome: Resolved/Not Met  Goal: Promote optimal lung function  Outcome: Resolved/Not Met     Problem: Breathing Pattern - Ineffective  Goal: Ability to achieve and maintain a regular respiratory rate  Outcome: Resolved/Not Met     Problem:  Body Temperature -  Risk of, Imbalanced  Goal: Ability to maintain a body temperature within defined limits  Outcome: Resolved/Not Met  Goal: Will regain or maintain usual level of consciousness  Outcome: Resolved/Not Met  Goal: Complications related to the disease process, condition or treatment will be avoided or minimized  Outcome: Resolved/Not Met     Problem: Isolation Precautions - Risk of Spread of Infection  Goal: Prevent transmission of infectious organism to others  Outcome: Resolved/Not Met     Problem: Nutrition Deficits  Goal: Optimize nutrtional status  Outcome: Resolved/Not Met     Problem: Risk for Fluid Volume Deficit  Goal: Maintain normal heart rhythm  Outcome: Resolved/Not Met  Goal: Maintain absence of muscle cramping  Outcome: Resolved/Not Met  Goal: Maintain normal serum potassium, sodium, calcium, phosphorus, and pH  Outcome: Resolved/Not Met     Problem: Loneliness or Risk for Loneliness  Goal: Demonstrate positive use of time alone when socialization is not possible  Outcome: Resolved/Not Met     Problem: Fatigue  Goal: Verbalize increase energy and improved vitality  Outcome: Resolved/Not Met     Problem: Patient Education: Go to Patient Education Activity  Goal: Patient/Family Education  Outcome: Resolved/Not Met     Problem: Pressure Injury - Risk of  Goal: *Prevention of pressure injury  Description: Document Wong Scale and appropriate interventions in the flowsheet. Outcome: Resolved/Not Met  Note: Pressure Injury Interventions:  Sensory Interventions: Assess need for specialty bed,Assess changes in LOC,Avoid rigorous massage over bony prominences    Moisture Interventions: Absorbent underpads,Apply protective barrier, creams and emollients,Assess need for specialty bed,Check for incontinence Q2 hours and as needed    Activity Interventions: Assess need for specialty bed,Increase time out of bed,Pressure redistribution bed/mattress(bed type)    Mobility Interventions: Assess need for specialty bed,Float heels,HOB 30 degrees or less,Pressure redistribution bed/mattress (bed type)    Nutrition Interventions: Document food/fluid/supplement intake,Offer support with meals,snacks and hydration    Friction and Shear Interventions: Apply protective barrier, creams and emollients,Feet elevated on foot rest,Foam dressings/transparent film/skin sealants,HOB 30 degrees or less                Problem: Patient Education: Go to Patient Education Activity  Goal: Patient/Family Education  Outcome: Resolved/Not Met     Problem: Falls - Risk of  Goal: *Absence of Falls  Description: Document Beth Fall Risk and appropriate interventions in the flowsheet.   Outcome: Resolved/Not Met  Note: Fall Risk Interventions:  Mobility Interventions: Bed/chair exit alarm,Communicate number of staff needed for ambulation/transfer,Patient to call before getting OOB    Mentation Interventions: Adequate sleep, hydration, pain control,Bed/chair exit alarm,Evaluate medications/consider consulting pharmacy    Medication Interventions: Patient to call before getting OOB,Evaluate medications/consider consulting pharmacy    Elimination Interventions: Bed/chair exit alarm,Call light in reach,Patient to call for help with toileting needs              Problem: Patient Education: Go to Patient Education Activity  Goal: Patient/Family Education  Outcome: Resolved/Not Met

## 2022-01-18 NOTE — TELEPHONE ENCOUNTER
----- Message from Elida Delgado sent at 1/18/2022  1:36 PM EST -----  Subject: Message to Provider    QUESTIONS  Information for Provider? Patients daughter is needing a call from the   nurse or PCP to discuss continuing hospice. ---------------------------------------------------------------------------  --------------  Kenisha ALLEN  What is the best way for the office to contact you? OK to leave message on   voicemail  Preferred Call Back Phone Number? [phone]  ---------------------------------------------------------------------------  --------------  SCRIPT ANSWERS  Relationship to Patient?  Third Party  Representative Name? irma mandel

## 2022-01-18 NOTE — PROGRESS NOTES
LUZ:  RUR: 18%    Disposition:  Home With Hospice ([facility])  Transport:  BLS transport was arranged with [facility] ([phone]) for 10am today. CM received call from Homer Humphries, [address] [facility] (admitting nurse) from [facility]). She is requesting to be contacted when patient leaves the hospital at [phone]. Homer Humphries states family is prepared to receive patient. Transport packet completed by previous CM and placed on hard chart. IM letter completed on 1/17/22 by previous CM. CM continuing to follow. Micki Knowles, [address], [address]    10:44a  Call placed to [facility] to obtain status of 10am transport. Unit should be here in 10 min.

## 2022-01-18 NOTE — DISCHARGE SUMMARY
Discharge Summary       PATIENT ID: Chin Ocasio  MRN: [id number]   YOB: 1933    DATE OF ADMISSION: 1/15/2022  7:43 PM    DATE OF DISCHARGE: 01/18/22   PRIMARY CARE PROVIDER: Taisha Hammond MD     ATTENDING PHYSICIAN: Esthela Maqrues MD  DISCHARGING PROVIDER: Esthela Marques MD    To contact this individual call [phone] and ask the  to page. If unavailable ask to be transferred the Adult Hospitalist Department. CONSULTATIONS: IP CONSULT TO NEPHROLOGY  IP CONSULT TO NEPHROLOGY  IP CONSULT TO CARDIOLOGY    PROCEDURES/SURGERIES: * No surgery found *    ADMITTING DIAGNOSES & HOSPITAL COURSE:   HPI: \"This is an 54-year-old woman with a past medical history significant for hypertension, anxiety/depression, chronic kidney disease, who was in her usual state of health until the day of her presentation at the emergency room when it was reported that the patient developed change in mental status.  The patient also has generalized weakness according to report from family members as well as fatigue, poor appetite.  EMS was called and when the EMS arrived at the scene, the patient's oxygen saturation was noted to be 70%.  She was brought to the emergency room for further evaluation.  The patient was recently admitted to the hospital from 01/09/2022 to 01/11/2022.  The patient was admitted and treated for diverticulitis.  When the patient arrived at the emergency room, chest x-ray shows bilateral lower lobe and mild interstitial pneumonia.  The patient tested positive for COVID-19 virus infection.  She was then referred to the hospitalist service for evaluation for admission.  No history of fever, rigors, or chills.  The patient is not able to provide history because of suspected underlying memory impairment. \"    Patient was evaluated for acute encephalopathy Likely Delirium on [facility]. Patient stable on 2 L nasal cannula.   Patient's D-dimer was mildly elevated and found to have CODIE on CKD initially started on bicarb and TTE and CTA chest ordered to r/o PE. Cardio and nephrology had evaluated patient. After discussion with POA daughter agreed to make patient hospice care and patient is DNR/DNI. Agreed to comfort care measures. Agreed to no escalation of care and no further invasive testing. DC with home hospice. DISCHARGE DIAGNOSES / PLAN:      Acute delirium on demenita  -CT head, UA  neg   -spoke to POA patient has baseline dementia that has been worsening, recently hospitalized and after discharge was refusing PO.      Acute respiratory failure with hypoxia. COVID +   -didmer elevated, spoke to daughter POA agreed to no escalation of care would like comfort measures and agreed to speak to hospice. - continue with supplemental oxygen, decadron   -will hold off on further invasive studies at this time      Hyperglycemia - improved no hx of dm       Hypokalemia.  We will replace potassium and repeat potassium level.     Acute-on-chronic kidney disease stage V  AGMA. -nephro following recs appreciated      6.  Anxiety/depression.  We will continue with home medication.     patient is DNR/DNI POA wishes to pursue comfort measures and agreement for hospice care.     Code status: DNR  DVT prophylaxis: was on heparin gtt, stopped no further escalation of care      Care Plan discussed with: Patient/Family and Nurse  Anticipated Disposition: TBD  Anticipated Discharge: 24 hours to 48 hours           FOLLOW UP APPOINTMENTS:    Follow-up Information     Follow up With Specialties Details Why Contact Info    Ivory Singleton MD Internal Medicine In 1 week  [address]  [facility] [address]  [facility] hospice agency.  [address] [place] [address]  [phone]           ADDITIONAL CARE RECOMMENDATIONS: comfort measures     DIET: Resume previous diet    ACTIVITY: Activity as tolerated          DISCHARGE MEDICATIONS:  Current Discharge Medication List      START taking these medications    Details   dexAMETHasone (DECADRON) 6 mg tablet Take 1 tab by mouth daily  Qty: 9 Tablet, Refills: 0  Start date: 1/17/2022         CONTINUE these medications which have NOT CHANGED    Details   megestroL (MEGACE) 20 mg tablet Take 1 Tablet by mouth daily. Qty: 30 Tablet, Refills: 1    Associated Diagnoses: Weight loss      metoprolol succinate (Toprol XL) 50 mg XL tablet Take 50 mg by mouth daily. sertraline (ZOLOFT) 25 mg tablet Take 25 mg by mouth nightly. 12.5 mg      OTHER,NON-FORMULARY, Blood sugar management herbal supplement one pill daily      pantoprazole (PROTONIX) 40 mg tablet TAKE 1 TABLET EVERY DAY  Qty: 90 Tablet, Refills: 0    Associated Diagnoses: Gastroesophageal reflux disease      mycophenolate mofetil (CELLCEPT) 250 mg capsule Take 750 mg by mouth two (2) times a day. aspirin 81 mg chewable tablet Take 81 mg by mouth daily. losartan (COZAAR) 25 mg tablet Take 25 mg by mouth daily. Refills: 1               NOTIFY YOUR PHYSICIAN FOR ANY OF THE FOLLOWING:   Fever over 101 degrees for 24 hours. Chest pain, shortness of breath, fever, chills, nausea, vomiting, diarrhea, change in mentation, falling, weakness, bleeding. Severe pain or pain not relieved by medications. Or, any other signs or symptoms that you may have questions about.     DISPOSITION:    Home With:   OT  PT  HH  RN       Long term SNF/Inpatient Rehab    Independent/assisted living   x Hospice    Other:       PATIENT CONDITION AT DISCHARGE:     Functional status   x Poor     Deconditioned     Independent      Cognition     Lucid     Forgetful    x Dementia      Code status     Full code    x DNR      PHYSICAL EXAMINATION AT DISCHARGE:  I had a face to face encounter with this patient and independently examined them on 1/17/2022 as outlined below:    Constitutional:  No acute distress, cooperative, pleasant    ENT:  Oral mucosa moist, oropharynx benign. Resp:  CTA bilaterally. No wheezing/rhonchi/rales. No accessory muscle use   CV:  Regular rhythm, normal rate, no murmurs, gallops, rubs    GI:  Soft, non distended, non tender. normoactive bowel sounds, no hepatosplenomegaly     Musculoskeletal:  No edema, warm, 2+ pulses throughout    Neurologic:  Moves all extremities.   AAOx1 to name, CN II-XII reviewed         CHRONIC MEDICAL DIAGNOSES:  Problem List as of 1/18/2022 Date Reviewed: 1/16/2022          Codes Class Noted - Resolved    * (Principal) Acute delirium ICD-10-CM: R41.0  ICD-9-CM: 780.09  1/15/2022 - Present        Sinus tachycardia ICD-10-CM: R00.0  ICD-9-CM: 427.89  1/10/2022 - Present        Weakness ICD-10-CM: R53.1  ICD-9-CM: 780.79  1/10/2022 - Present        Nausea & vomiting ICD-10-CM: R11.2  ICD-9-CM: 787.01  1/10/2022 - Present        Pneumonia due to COVID-19 virus ICD-10-CM: U07.1, J12.82  ICD-9-CM: 480.8, 079.89  1/10/2022 - Present        Acute diverticulitis ICD-10-CM: K57.92  ICD-9-CM: 562.11  1/9/2022 - Present        CKD (chronic kidney disease) stage 4, GFR 15-29 ml/min ([facility]) ICD-10-CM: N18.4  ICD-9-CM: 585.4  1/9/2022 - Present        HTN (hypertension), benign ICD-10-CM: I10  ICD-9-CM: 401.1  1/9/2022 - Present        GERD (gastroesophageal reflux disease) ICD-10-CM: K21.9  ICD-9-CM: 530.81  1/9/2022 - Present        Anxiety ICD-10-CM: F41.9  ICD-9-CM: 300.00  1/9/2022 - Present        MCI (mild cognitive impairment) ICD-10-CM: G31.84  ICD-9-CM: 331.83  1/9/2022 - Present        RESOLVED: Severe obesity ([facility] ICD-10-CM: E66.01  ICD-9-CM: 278.01  10/22/2019 - 1/10/2022        RESOLVED: Chronic kidney disease (CKD) stage G3b/A3, moderately decreased glomerular filtration rate (GFR) between 30-44 mL/min/1.73 square meter and albuminuria creatinine ratio greater than 300 mg/g ([facility] ICD-10-CM: I83.76  ICD-9-CM: 585.3  3/13/2019 - 1/10/2022              Greater than 30 minutes were spent with the patient on counseling and coordination of care    Signed:   Roderick Quesada MD  1/18/2022  12:03 PM

## 2022-01-18 NOTE — PROGRESS NOTES
Called Gloria and let her know the pt is on her way home. Called Kerline from [facility] and let her know the pt is on the way home.

## 2022-01-25 ENCOUNTER — VIRTUAL VISIT (OUTPATIENT)
Dept: INTERNAL MEDICINE CLINIC | Age: 87
End: 2022-01-25
Payer: MEDICARE

## 2022-01-25 DIAGNOSIS — F03.90 DEMENTIA WITHOUT BEHAVIORAL DISTURBANCE, UNSPECIFIED DEMENTIA TYPE: ICD-10-CM

## 2022-01-25 DIAGNOSIS — U07.1 COVID-19: Primary | ICD-10-CM

## 2022-01-25 DIAGNOSIS — I10 ESSENTIAL HYPERTENSION: ICD-10-CM

## 2022-01-25 DIAGNOSIS — N18.4 CKD (CHRONIC KIDNEY DISEASE) STAGE 4, GFR 15-29 ML/MIN (HCC): ICD-10-CM

## 2022-01-25 PROCEDURE — 1090F PRES/ABSN URINE INCON ASSESS: CPT | Performed by: INTERNAL MEDICINE

## 2022-01-25 PROCEDURE — 99214 OFFICE O/P EST MOD 30 MIN: CPT | Performed by: INTERNAL MEDICINE

## 2022-01-25 PROCEDURE — G8432 DEP SCR NOT DOC, RNG: HCPCS | Performed by: INTERNAL MEDICINE

## 2022-01-25 PROCEDURE — 1111F DSCHRG MED/CURRENT MED MERGE: CPT | Performed by: INTERNAL MEDICINE

## 2022-01-25 PROCEDURE — G8427 DOCREV CUR MEDS BY ELIG CLIN: HCPCS | Performed by: INTERNAL MEDICINE

## 2022-01-25 PROCEDURE — 1101F PT FALLS ASSESS-DOCD LE1/YR: CPT | Performed by: INTERNAL MEDICINE

## 2022-01-25 NOTE — PROGRESS NOTES
Nai Smith (: 1933) is a 80 y.o. female, established patient, here for evaluation of the following chief complaint(s):   Hospital Follow Up ([id number], SOB, not eating, confusion)       ASSESSMENT/PLAN:  Below is the assessment and plan developed based on review of pertinent history, labs, studies, and medications. 1. COVID-19  I believe that it is time to transition the hospice doctor for the sake of convenience for the family, given their availability, relationship to nurses, and experience with pain medications. I don't think that she needs to continue taking certain medications (Zoloft, Megace, and Protonix) if she is having difficulty swallowing, but recommended that they continue with Losartan, metoprolol, and Cellcept. However, if her BP with hospice nurse is stable, Losartan and Metoprolol can be discontinued as well. 2. CKD (chronic kidney disease) stage 4, GFR 15-29 ml/min (HCC)  No major changes between previous and most recent blood work. Continue with ongoing regimen of Cellcept. 3. Essential hypertension  I recommended that the pt's family continue administering Losartan and Metoprolol, unless her BP is very low with the Hospice Nurses. 4. Dementia without behavioral disturbance, unspecified dementia type ([facility]  I believe that it is time to transition the hospice doctor for the sake of convenience for the family, given their availability, relationship to nurses, and experience with pain medications. I don't think that she needs to continue taking certain medications (Zoloft, Megace, and Protonix) if she is having difficulty swallowing, but recommended that they continue with Losartan, metoprolol, and Cellcept. However, if her BP with hospice nurse is stable, Losartan and Metoprolol can be discontinued as well. No follow-ups on file. SUBJECTIVE/OBJECTIVE:  HPI    LUZ: Pt presented to the ED on 1/15/22 with delirium.  Her CT of the head was normal and her UA was negative, but she was hypoxic secondary to COVID-19. Pt's daughter, who is POA, requested only comfort measures and opted for hospice care. Her granddaughter reports confusion and decreased appetite. She is taking all of the same medications as of right now. Hospice nurses are coming to the home to check her vitals. Her granddaughter states that her dementia has progressed and she has not eaten solid food in 3 week. Pt denies pain. Review of Systems   Constitutional: Positive for activity change and appetite change. HENT: Positive for trouble swallowing. Psychiatric/Behavioral: Positive for confusion. All other systems reviewed and are negative.        No data recorded     Physical Exam    [INSTRUCTIONS:  \"[x]\" Indicates a positive item  \"[]\" Indicates a negative item  -- DELETE ALL ITEMS NOT EXAMINED]    Constitutional: [x] Appears well-developed and well-nourished [x] No apparent distress      [] Abnormal -     Mental status: [x] Alert and awake  [x] Oriented to person/place/time [x] Able to follow commands    [] Abnormal -     Eyes:   EOM    [x]  Normal    [] Abnormal -   Sclera  [x]  Normal    [] Abnormal -          Discharge [x]  None visible   [] Abnormal -     HENT: [x] Normocephalic, atraumatic  [] Abnormal -   [x] Mouth/Throat: Mucous membranes are moist    External Ears [x] Normal  [] Abnormal -    Neck: [x] No visualized mass [] Abnormal -     Pulmonary/Chest: [x] Respiratory effort normal   [x] No visualized signs of difficulty breathing or respiratory distress        [] Abnormal -      Musculoskeletal:   [x] Normal gait with no signs of ataxia         [x] Normal range of motion of neck        [] Abnormal -     Neurological:        [x] No Facial Asymmetry (Cranial nerve 7 motor function) (limited exam due to video visit)          [x] No gaze palsy        [] Abnormal -          Skin:        [x] No significant exanthematous lesions or discoloration noted on facial skin         [] Abnormal - Psychiatric:       [x] Normal Affect [] Abnormal -        [x] No Hallucinations    Other pertinent observable physical exam findings:-        On this date 01/25/2022 I have spent 35 minutes reviewing previous notes, test results and face to face (virtual) with the patient discussing the diagnosis and importance of compliance with the treatment plan as well as documenting on the day of the visit. Toney Wilhelm, was evaluated through a synchronous (real-time) audio-video encounter. The patient (or guardian if applicable) is aware that this is a billable service, which includes applicable co-pays. Verbal consent to proceed has been obtained. The visit was conducted pursuant to the emergency declaration under the [address] [place] [address] authority and the [employer] Act. Patient identification was verified, and a caregiver was present when appropriate. The patient was located at home in a state where the provider was licensed to provide care. An electronic signature was used to authenticate this note. Written by Alysia Bentley as dictated by Dr. Julian Ramirez.    -- Alysia Bentley

## 2022-03-18 PROBLEM — K57.92 ACUTE DIVERTICULITIS: Status: ACTIVE | Noted: 2022-01-09

## 2022-03-18 PROBLEM — F41.9 ANXIETY: Status: ACTIVE | Noted: 2022-01-09

## 2022-03-18 PROBLEM — R11.2 NAUSEA & VOMITING: Status: ACTIVE | Noted: 2022-01-10

## 2022-03-18 PROBLEM — K21.9 GERD (GASTROESOPHAGEAL REFLUX DISEASE): Status: ACTIVE | Noted: 2022-01-09

## 2022-03-19 PROBLEM — I10 HTN (HYPERTENSION), BENIGN: Status: ACTIVE | Noted: 2022-01-09

## 2022-03-19 PROBLEM — U07.1 PNEUMONIA DUE TO COVID-19 VIRUS: Status: ACTIVE | Noted: 2022-01-10

## 2022-03-19 PROBLEM — J12.82 PNEUMONIA DUE TO COVID-19 VIRUS: Status: ACTIVE | Noted: 2022-01-10

## 2022-03-19 PROBLEM — R00.0 SINUS TACHYCARDIA: Status: ACTIVE | Noted: 2022-01-10

## 2022-03-19 PROBLEM — G31.84 MCI (MILD COGNITIVE IMPAIRMENT): Status: ACTIVE | Noted: 2022-01-09

## 2022-03-19 PROBLEM — R41.0 ACUTE DELIRIUM: Status: ACTIVE | Noted: 2022-01-15

## 2022-03-20 PROBLEM — N18.4 CKD (CHRONIC KIDNEY DISEASE) STAGE 4, GFR 15-29 ML/MIN (HCC): Status: ACTIVE | Noted: 2022-01-09

## 2022-03-20 PROBLEM — R53.1 WEAKNESS: Status: ACTIVE | Noted: 2022-01-10

## 2025-07-03 NOTE — PROGRESS NOTES
Patient found fully dressed with IV removed attempting to leave room to go \"visit with daughter and grandson. \" Patient asked if she was leaving the hospital and stated no. Patient escorted back to room and told that due to her COVID+ status she would have to stay in the room while TW checked on this.  Patient's grandson called and stated that neither of the family members were at the hospital. Patient's grandson reports that patient has begun to display period confusion, needs occasional reorienting, and has been referred to a memory care specialist. Nursing home